# Patient Record
Sex: FEMALE | Race: WHITE | NOT HISPANIC OR LATINO | Employment: FULL TIME | URBAN - METROPOLITAN AREA
[De-identification: names, ages, dates, MRNs, and addresses within clinical notes are randomized per-mention and may not be internally consistent; named-entity substitution may affect disease eponyms.]

---

## 2017-01-04 ENCOUNTER — OFFICE VISIT (OUTPATIENT)
Dept: FAMILY MEDICINE CLINIC | Facility: CLINIC | Age: 62
End: 2017-01-04

## 2017-01-04 VITALS
HEIGHT: 62 IN | SYSTOLIC BLOOD PRESSURE: 110 MMHG | WEIGHT: 117 LBS | HEART RATE: 98 BPM | RESPIRATION RATE: 16 BRPM | BODY MASS INDEX: 21.53 KG/M2 | TEMPERATURE: 98.5 F | DIASTOLIC BLOOD PRESSURE: 70 MMHG | OXYGEN SATURATION: 99 %

## 2017-01-04 DIAGNOSIS — R05.9 COUGH: ICD-10-CM

## 2017-01-04 DIAGNOSIS — J06.9 ACUTE URI: Primary | ICD-10-CM

## 2017-01-04 PROCEDURE — 99213 OFFICE O/P EST LOW 20 MIN: CPT | Performed by: FAMILY MEDICINE

## 2017-01-04 RX ORDER — AZITHROMYCIN 250 MG/1
TABLET, FILM COATED ORAL
Qty: 6 TABLET | Refills: 0 | Status: SHIPPED | OUTPATIENT
Start: 2017-01-04 | End: 2017-02-02

## 2017-01-04 NOTE — PATIENT INSTRUCTIONS
I have given you a Zpak and advised continuing Mucinex and lots of fluids.  Please call if not improving in a week or so.  Please come back for a Preventive Exam.

## 2017-01-04 NOTE — PROGRESS NOTES
Subjective   June Baca is a 61 y.o. female.     History of Present Illness   Stormy is here w/ productive cough.  She states she became ill about 3 weeks ago w/ uri but cough has remained.  She is taking Mucinex, Sudafed and [place].she reports no fever, mild headache, no muscle aches.    The following portions of the patient's history were reviewed and updated as appropriate: allergies, current medications, past medical history, past social history and problem list.    Review of Systems   HENT: Positive for voice change.    Respiratory: Positive for cough.    All other systems reviewed and are negative.      Objective   Physical Exam   Constitutional: She is oriented to person, place, and time. She appears well-developed and well-nourished. No distress.   HENT:   Head: Normocephalic and atraumatic.   Right Ear: External ear normal.   Left Ear: External ear normal.   Nose: Nose normal.   Mouth/Throat: Oropharynx is clear and moist. No oropharyngeal exudate.   Eyes: Conjunctivae and EOM are normal. Pupils are equal, round, and reactive to light.   Neck: Normal range of motion.   Cardiovascular: Normal rate and regular rhythm.    Pulmonary/Chest: Effort normal and breath sounds normal. No stridor. No respiratory distress. She has no wheezes.   Lymphadenopathy:     She has no cervical adenopathy.   Neurological: She is alert and oriented to person, place, and time.   Skin: Skin is warm and dry. No rash noted.   Nursing note and vitals reviewed.      Assessment/Plan   June was seen today for cough.    Diagnoses and all orders for this visit:    Acute URI    Cough  I have advised OTC cough meds and started her on a  -     azithromycin (ZITHROMAX Z-TONIE) 250 MG tablet; Take 2 tablets the first day, then 1 tablet daily for 4 days.

## 2017-01-04 NOTE — MR AVS SNAPSHOT
June Baca   1/4/2017 9:50 AM   Office Visit    Provider:  Mike Goldsmith MD   Department:  [facility] PRIMARY CARE   Dept Phone:  [phone]                Your Full Care Plan              Today's Medication Changes          These changes are accurate as of: 1/4/17 10:56 AM.  If you have any questions, ask your nurse or doctor.               New Medication(s)Ordered:     azithromycin 250 MG tablet   Commonly known as:  ZITHROMAX Z-TONIE   Take 2 tablets the first day, then 1 tablet daily for 4 days.   Started by:  Mike Goldsmith MD            Where to Get Your Medications      These medications were sent to [address] AT [address] [place] [address] - [phone] [place] [id number] [place] [address]     Phone:  [phone]     azithromycin 250 MG tablet                  Your Updated Medication List          This list is accurate as of: 1/4/17 10:56 AM.  Always use your most recent med list.                azithromycin 250 MG tablet   Commonly known as:  ZITHROMAX Z-TONIE   Take 2 tablets the first day, then 1 tablet daily for 4 days.       betamethasone dipropionate 0.05 % cream   Commonly known as:  DIPROLENE       cetirizine 10 MG tablet   Commonly known as:  zyrTEC       fluticasone 50 MCG/ACT nasal spray   Commonly known as:  FLONASE       pseudoephedrine 30 MG tablet   Commonly known as:  SUDAFED       raloxifene 60 MG tablet   Commonly known as:  EVISTA   Take 1 tablet by mouth Daily.       Vitamin C ER 1000 MG tablet controlled-release               Instructions    I have given you a Zpak and advised continuing Mucinex and lots of fluids.  Please call if not improving in a week or so.  Please come back for a Preventive Exam.      Patient Instructions History      [facility] Signup     [facility] allows you to send messages to your doctor, view your test results, renew your  "prescriptions, schedule appointments, and more. To sign up, go to [employer] and click on the Sign Up Now link in the New User? box. Enter your [employer] Activation Code exactly as it appears below along with the last four digits of your Social Security Number and your Date of Birth () to complete the sign-up process. If you do not sign up before the expiration date, you must request a new code.    [employer] Activation Code: [email or web address]  Expires: 2017 10:53 AM    If you have questions, you can email [email or web address] or call [phone] to talk to our [employer] staff. Remember, [employer] is NOT to be used for urgent needs. For medical emergencies, dial 911.               Other Info from Your Visit           Your Appointments     2017 11:00 AM EST   Physical with Mike Goldsmith MD   [facility] PRIMARY CARE (--)    [address] [place], [place] [id number] [phone]           Arrive 15 minutes prior to appointment.            Feb 10, 2017  9:00 AM EST   [facility] (--)    [address] [place] [address] [phone]            2017 11:00 AM EDT   Lab with LAB CHAIR [facility] ([address] [address]   [phone]            2017 11:40 AM EDT   FOLLOW UP with Jamia Bishop MD   [facility] CBC GROUP: CONSULTANTS IN BLOOD DISORDERS AND CANCER ([facility])    [address] [address] [phone]              Allergies     No Known Allergies      Reason for Visit     Cough           Vital Signs     Blood Pressure Pulse Temperature Respirations Height Weight    110/70 98 98.5 °F (36.9 °C) 16 62\" (157.5 cm) 117 lb (53.1 kg)    Oxygen Saturation Body Mass Index Smoking Status             99% 21.4 kg/m2 Never Smoker         "

## 2017-01-27 ENCOUNTER — LAB (OUTPATIENT)
Dept: FAMILY MEDICINE CLINIC | Facility: CLINIC | Age: 62
End: 2017-01-27

## 2017-01-27 DIAGNOSIS — C50.919 MALIGNANT NEOPLASM OF FEMALE BREAST, UNSPECIFIED LATERALITY, UNSPECIFIED SITE OF BREAST: ICD-10-CM

## 2017-01-27 DIAGNOSIS — C50.919 MALIGNANT NEOPLASM OF BREAST (HCC): ICD-10-CM

## 2017-01-27 DIAGNOSIS — Z13.220 SCREENING FOR LIPOID DISORDERS: Primary | ICD-10-CM

## 2017-01-27 LAB
CHOLEST SERPL-MCNC: 210 MG/DL (ref 0–200)
HDLC SERPL-MCNC: 73 MG/DL (ref 40–60)
LDLC SERPL CALC-MCNC: 123 MG/DL (ref 0–100)
LDLC/HDLC SERPL: 1.68 {RATIO}
TRIGL SERPL-MCNC: 71 MG/DL (ref 0–150)
VLDLC SERPL CALC-MCNC: 14.2 MG/DL (ref 5–40)

## 2017-02-02 ENCOUNTER — OFFICE VISIT (OUTPATIENT)
Dept: FAMILY MEDICINE CLINIC | Facility: CLINIC | Age: 62
End: 2017-02-02

## 2017-02-02 VITALS
SYSTOLIC BLOOD PRESSURE: 104 MMHG | DIASTOLIC BLOOD PRESSURE: 64 MMHG | HEIGHT: 62 IN | WEIGHT: 118 LBS | BODY MASS INDEX: 21.71 KG/M2 | RESPIRATION RATE: 16 BRPM | HEART RATE: 82 BPM | OXYGEN SATURATION: 99 %

## 2017-02-02 DIAGNOSIS — Z00.00 ROUTINE GENERAL MEDICAL EXAMINATION AT A HEALTH CARE FACILITY: Primary | ICD-10-CM

## 2017-02-02 PROCEDURE — 99396 PREV VISIT EST AGE 40-64: CPT | Performed by: FAMILY MEDICINE

## 2017-02-02 NOTE — PATIENT INSTRUCTIONS
Preventive Care for Adults, Female  A healthy lifestyle and preventive care can promote health and wellness. Preventive health guidelines for women include the following key practices.  · A routine yearly physical is a good way to check with your health care provider about your health and preventive screening. It is a chance to share any concerns and updates on your health and to receive a thorough exam.  · Visit your dentist for a routine exam and preventive care every 6 months. Brush your teeth twice a day and floss once a day. Good oral hygiene prevents tooth decay and gum disease.  · The frequency of eye exams is based on your age, health, family medical history, use of contact lenses, and other factors. Follow your health care provider's recommendations for frequency of eye exams.  · Eat a healthy diet. Foods like vegetables, fruits, whole grains, low-fat dairy products, and lean protein foods contain the nutrients you need without too many calories. Decrease your intake of foods high in solid fats, added sugars, and salt. Eat the right amount of calories for you. Get information about a proper diet from your health care provider, if necessary.  · Regular physical exercise is one of the most important things you can do for your health. Most adults should get at least 150 minutes of moderate-intensity exercise (any activity that increases your heart rate and causes you to sweat) each week. In addition, most adults need muscle-strengthening exercises on 2 or more days a week.  · Maintain a healthy weight. The body mass index (BMI) is a screening tool to identify possible weight problems. It provides an estimate of body fat based on height and weight. Your health care provider can find your BMI and can help you achieve or maintain a healthy weight. For adults 20 years and older:    A BMI below 18.5 is considered underweight.    A BMI of 18.5 to 24.9 is normal.    A BMI of 25 to 29.9 is considered overweight.    A  BMI of 30 and above is considered obese.  · Maintain normal blood lipids and cholesterol levels by exercising and minimizing your intake of saturated fat. Eat a balanced diet with plenty of fruit and vegetables. Blood tests for lipids and cholesterol should begin at age 20 and be repeated every 5 years. If your lipid or cholesterol levels are high, you are over 50, or you are at high risk for heart disease, you may need your cholesterol levels checked more frequently. Ongoing high lipid and cholesterol levels should be treated with medicines if diet and exercise are not working.  · If you smoke, find out from your health care provider how to quit. If you do not use tobacco, do not start.  · Lung cancer screening is recommended for adults aged 55-80 years who are at high risk for developing lung cancer because of a history of smoking. A yearly low-dose CT scan of the lungs is recommended for people who have at least a 30-pack-year history of smoking and are a current smoker or have quit within the past 15 years. A pack year of smoking is smoking an average of 1 pack of cigarettes a day for 1 year (for example: 1 pack a day for 30 years or 2 packs a day for 15 years). Yearly screening should continue until the smoker has stopped smoking for at least 15 years. Yearly screening should be stopped for people who develop a health problem that would prevent them from having lung cancer treatment.  · If you are pregnant, do not drink alcohol. If you are breastfeeding, be very cautious about drinking alcohol. If you are not pregnant and choose to drink alcohol, do not have more than 1 drink per day. One drink is considered to be 12 ounces (355 mL) of beer, 5 ounces (148 mL) of wine, or 1.5 ounces (44 mL) of liquor.  · Avoid use of street drugs. Do not share needles with anyone. Ask for help if you need support or instructions about stopping the use of drugs.  · High blood pressure causes heart disease and increases the risk  "of stroke. Your blood pressure should be checked at least every 1 to 2 years. Ongoing high blood pressure should be treated with medicines if weight loss and exercise do not work.  · If you are 55-79 years old, ask your health care provider if you should take aspirin to prevent strokes.  · Diabetes screening is done by taking a blood sample to check your blood glucose level after you have not eaten for a certain period of time (fasting). If you are not overweight and you do not have risk factors for diabetes, you should be screened once every 3 years starting at age 45. If you are overweight or obese and you are 40-70 years of age, you should be screened for diabetes every year as part of your cardiovascular risk assessment.  · Breast cancer screening is essential preventive care for women. You should practice \"breast self-awareness.\" This means understanding the normal appearance and feel of your breasts and may include breast self-examination. Any changes detected, no matter how small, should be reported to a health care provider. Women in their 20s and 30s should have a clinical breast exam (CBE) by a health care provider as part of a regular health exam every 1 to 3 years. After age 40, women should have a CBE every year. Starting at age 40, women should consider having a mammogram (breast X-ray test) every year. Women who have a family history of breast cancer should talk to their health care provider about genetic screening. Women at a high risk of breast cancer should talk to their health care providers about having an MRI and a mammogram every year.  · Breast cancer gene (BRCA)-related cancer risk assessment is recommended for women who have family members with BRCA-related cancers. BRCA-related cancers include breast, ovarian, tubal, and peritoneal cancers. Having family members with these cancers may be associated with an increased risk for harmful changes (mutations) in the breast cancer genes BRCA1 and " BRCA2. Results of the assessment will determine the need for genetic counseling and BRCA1 and BRCA2 testing.  · Your health care provider may recommend that you be screened regularly for cancer of the pelvic organs (ovaries, uterus, and vagina). This screening involves a pelvic examination, including checking for microscopic changes to the surface of your cervix (Pap test). You may be encouraged to have this screening done every 3 years, beginning at age 21.    For women ages 30-65, health care providers may recommend pelvic exams and Pap testing every 3 years, or they may recommend the Pap and pelvic exam, combined with testing for human papilloma virus (HPV), every 5 years. Some types of HPV increase your risk of cervical cancer. Testing for HPV may also be done on women of any age with unclear Pap test results.    Other health care providers may not recommend any screening for nonpregnant women who are considered low risk for pelvic cancer and who do not have symptoms. Ask your health care provider if a screening pelvic exam is right for you.  · If you have had past treatment for cervical cancer or a condition that could lead to cancer, you need Pap tests and screening for cancer for at least 20 years after your treatment. If Pap tests have been discontinued, your risk factors (such as having a new sexual partner) need to be reassessed to determine if screening should resume. Some women have medical problems that increase the chance of getting cervical cancer. In these cases, your health care provider may recommend more frequent screening and Pap tests.  · Colorectal cancer can be detected and often prevented. Most routine colorectal cancer screening begins at the age of 50 years and continues through age 75 years. However, your health care provider may recommend screening at an earlier age if you have risk factors for colon cancer. On a yearly basis, your health care provider may provide home test kits to check  for hidden blood in the stool. Use of a small camera at the end of a tube, to directly examine the colon (sigmoidoscopy or colonoscopy), can detect the earliest forms of colorectal cancer. Talk to your health care provider about this at age 50, when routine screening begins.  Direct exam of the colon should be repeated every 5-10 years through age 75 years, unless early forms of precancerous polyps or small growths are found.  · People who are at an increased risk for hepatitis B should be screened for this virus. You are considered at high risk for hepatitis B if:    You were born in a country where hepatitis B occurs often. Talk with your health care provider about which countries are considered high risk.    Your parents were born in a high-risk country and you have not received a shot to protect against hepatitis B (hepatitis B vaccine).    You have HIV or AIDS.    You use needles to inject street drugs.    You live with, or have sex with, someone who has hepatitis B.    You get hemodialysis treatment.    You take certain medicines for conditions like cancer, organ transplantation, and autoimmune conditions.  · Hepatitis C blood testing is recommended for all people born from 1945 through 1965 and any individual with known risks for hepatitis C.  · Practice safe sex. Use condoms and avoid high-risk sexual practices to reduce the spread of sexually transmitted infections (STIs). STIs include gonorrhea, chlamydia, syphilis, trichomonas, herpes, HPV, and human immunodeficiency virus (HIV). Herpes, HIV, and HPV are viral illnesses that have no cure. They can result in disability, cancer, and death.  · You should be screened for sexually transmitted illnesses (STIs) including gonorrhea and chlamydia if:    You are sexually active and are younger than 24 years.    You are older than 24 years and your health care provider tells you that you are at risk for this type of infection.    Your sexual activity has changed  since you were last screened and you are at an increased risk for chlamydia or gonorrhea. Ask your health care provider if you are at risk.  · If you are at risk of being infected with HIV, it is recommended that you take a prescription medicine daily to prevent HIV infection. This is called preexposure prophylaxis (PrEP). You are considered at risk if:    You are sexually active and do not regularly use condoms or know the HIV status of your partner(s).    You take drugs by injection.    You are sexually active with a partner who has HIV.    Talk with your health care provider about whether you are at high risk of being infected with HIV. If you choose to begin PrEP, you should first be tested for HIV. You should then be tested every 3 months for as long as you are taking PrEP.  · Osteoporosis is a disease in which the bones lose minerals and strength with aging. This can result in serious bone fractures or breaks. The risk of osteoporosis can be identified using a bone density scan. Women ages 65 years and over and women at risk for fractures or osteoporosis should discuss screening with their health care providers. Ask your health care provider whether you should take a calcium supplement or vitamin D to reduce the rate of osteoporosis.  · Menopause can be associated with physical symptoms and risks. Hormone replacement therapy is available to decrease symptoms and risks. You should talk to your health care provider about whether hormone replacement therapy is right for you.  · Use sunscreen. Apply sunscreen liberally and repeatedly throughout the day. You should seek shade when your shadow is shorter than you. Protect yourself by wearing long sleeves, pants, a wide-brimmed hat, and sunglasses year round, whenever you are outdoors.  · Once a month, do a whole body skin exam, using a mirror to look at the skin on your back. Tell your health care provider of new moles, moles that have irregular borders, moles that  are larger than a pencil eraser, or moles that have changed in shape or color.  · Stay current with required vaccines (immunizations).    Influenza vaccine. All adults should be immunized every year.    Tetanus, diphtheria, and acellular pertussis (Td, Tdap) vaccine. Pregnant women should receive 1 dose of Tdap vaccine during each pregnancy. The dose should be obtained regardless of the length of time since the last dose. Immunization is preferred during the 27th-36th week of gestation. An adult who has not previously received Tdap or who does not know her vaccine status should receive 1 dose of Tdap. This initial dose should be followed by tetanus and diphtheria toxoids (Td) booster doses every 10 years. Adults with an unknown or incomplete history of completing a 3-dose immunization series with Td-containing vaccines should begin or complete a primary immunization series including a Tdap dose. Adults should receive a Td booster every 10 years.    Varicella vaccine. An adult without evidence of immunity to varicella should receive 2 doses or a second dose if she has previously received 1 dose. Pregnant females who do not have evidence of immunity should receive the first dose after pregnancy. This first dose should be obtained before leaving the health care facility. The second dose should be obtained 4-8 weeks after the first dose.    Human papillomavirus (HPV) vaccine. Females aged 13-26 years who have not received the vaccine previously should obtain the 3-dose series. The vaccine is not recommended for use in pregnant females. However, pregnancy testing is not needed before receiving a dose. If a female is found to be pregnant after receiving a dose, no treatment is needed. In that case, the remaining doses should be delayed until after the pregnancy. Immunization is recommended for any person with an immunocompromised condition through the age of 26 years if she did not get any or all doses earlier. During the  3-dose series, the second dose should be obtained 4-8 weeks after the first dose. The third dose should be obtained 24 weeks after the first dose and 16 weeks after the second dose.    Zoster vaccine. One dose is recommended for adults aged 60 years or older unless certain conditions are present.    Measles, mumps, and rubella (MMR) vaccine. Adults born before 1957 generally are considered immune to measles and mumps. Adults born in 1957 or later should have 1 or more doses of MMR vaccine unless there is a contraindication to the vaccine or there is laboratory evidence of immunity to each of the three diseases. A routine second dose of MMR vaccine should be obtained at least 28 days after the first dose for students attending postsecondary schools, health care workers, or international travelers. People who received inactivated measles vaccine or an unknown type of measles vaccine during [id number] should receive 2 doses of MMR vaccine. People who received inactivated mumps vaccine or an unknown type of mumps vaccine before 1979 and are at high risk for mumps infection should consider immunization with 2 doses of MMR vaccine. For females of childbearing age, rubella immunity should be determined. If there is no evidence of immunity, females who are not pregnant should be vaccinated. If there is no evidence of immunity, females who are pregnant should delay immunization until after pregnancy. Unvaccinated health care workers born before 1957 who lack laboratory evidence of measles, mumps, or rubella immunity or laboratory confirmation of disease should consider measles and mumps immunization with 2 doses of MMR vaccine or rubella immunization with 1 dose of MMR vaccine.    Pneumococcal 13-valent conjugate (PCV13) vaccine. When indicated, a person who is uncertain of his immunization history and has no record of immunization should receive the PCV13 vaccine. All adults 65 years of age and older should receive this  vaccine. An adult aged 19 years or older who has certain medical conditions and has not been previously immunized should receive 1 dose of PCV13 vaccine. This PCV13 should be followed with a dose of pneumococcal polysaccharide (PPSV23) vaccine. Adults who are at high risk for pneumococcal disease should obtain the PPSV23 vaccine at least 8 weeks after the dose of PCV13 vaccine. Adults older than 65 years of age who have normal immune system function should obtain the PPSV23 vaccine dose at least 1 year after the dose of PCV13 vaccine.    Pneumococcal polysaccharide (PPSV23) vaccine. When PCV13 is also indicated, PCV13 should be obtained first. All adults aged 65 years and older should be immunized. An adult younger than age 65 years who has certain medical conditions should be immunized. Any person who resides in a nursing home or long-term care facility should be immunized. An adult smoker should be immunized. People with an immunocompromised condition and certain other conditions should receive both PCV13 and PPSV23 vaccines. People with human immunodeficiency virus (HIV) infection should be immunized as soon as possible after diagnosis. Immunization during chemotherapy or radiation therapy should be avoided. Routine use of PPSV23 vaccine is not recommended for American Indians, [place] Natives, or people younger than 65 years unless there are medical conditions that require PPSV23 vaccine. When indicated, people who have unknown immunization and have no record of immunization should receive PPSV23 vaccine. One-time revaccination 5 years after the first dose of PPSV23 is recommended for people aged 19-64 years who have chronic kidney failure, nephrotic syndrome, asplenia, or immunocompromised conditions. People who received 1-2 doses of PPSV23 before age 65 years should receive another dose of PPSV23 vaccine at age 65 years or later if at least 5 years have passed since the previous dose. Doses of PPSV23 are not  needed for people immunized with PPSV23 at or after age 65 years.    Meningococcal vaccine. Adults with asplenia or persistent complement component deficiencies should receive 2 doses of quadrivalent meningococcal conjugate (MenACWY-D) vaccine. The doses should be obtained at least 2 months apart. Microbiologists working with certain meningococcal bacteria,  recruits, people at risk during an outbreak, and people who travel to or live in countries with a high rate of meningitis should be immunized. A first-year college student up through age 21 years who is living in a residence schmidt should receive a dose if she did not receive a dose on or after her 16th birthday. Adults who have certain high-risk conditions should receive one or more doses of vaccine.    Hepatitis A vaccine. Adults who wish to be protected from this disease, have certain high-risk conditions, work with hepatitis A-infected animals, work in hepatitis A research labs, or travel to or work in countries with a high rate of hepatitis A should be immunized. Adults who were previously unvaccinated and who anticipate close contact with an international adoptee during the first 60 days after arrival in the [place] from a country with a high rate of hepatitis A should be immunized.    Hepatitis B vaccine. Adults who wish to be protected from this disease, have certain high-risk conditions, may be exposed to blood or other infectious body fluids, are household contacts or sex partners of hepatitis B positive people, are clients or workers in certain care facilities, or travel to or work in countries with a high rate of hepatitis B should be immunized.    Haemophilus influenzae type b (Hib) vaccine. A previously unvaccinated person with asplenia or sickle cell disease or having a scheduled splenectomy should receive 1 dose of Hib vaccine. Regardless of previous immunization, a recipient of a hematopoietic stem cell transplant should receive a  3-dose series 6-12 months after her successful transplant. Hib vaccine is not recommended for adults with HIV infection.  Preventive Services / Frequency  Ages 19 to 39 years  · Blood pressure check.** / Every 3-5 years.  · Lipid and cholesterol check.** / Every 5 years beginning at age 20.  · Clinical breast exam.** / Every 3 years for women in their 20s and 30s.  · BRCA-related cancer risk assessment.** / For women who have family members with a BRCA-related cancer (breast, ovarian, tubal, or peritoneal cancers).  · Pap test.** / Every 2 years from ages 21 through 29. Every 3 years starting at age 30 through age 65 or 70 with a history of 3 consecutive normal Pap tests.  · HPV screening.** / Every 3 years from ages 30 through ages 65 to 70 with a history of 3 consecutive normal Pap tests.  · Hepatitis C blood test.** / For any individual with known risks for hepatitis C.  · Skin self-exam. / Monthly.  · Influenza vaccine. / Every year.  · Tetanus, diphtheria, and acellular pertussis (Tdap, Td) vaccine.** / Consult your health care provider. Pregnant women should receive 1 dose of Tdap vaccine during each pregnancy. 1 dose of Td every 10 years.  · Varicella vaccine.** / Consult your health care provider. Pregnant females who do not have evidence of immunity should receive the first dose after pregnancy.  · HPV vaccine. / 3 doses over 6 months, if 26 and younger. The vaccine is not recommended for use in pregnant females. However, pregnancy testing is not needed before receiving a dose.  · Measles, mumps, rubella (MMR) vaccine.** / You need at least 1 dose of MMR if you were born in 1957 or later. You may also need a 2nd dose. For females of childbearing age, rubella immunity should be determined. If there is no evidence of immunity, females who are not pregnant should be vaccinated. If there is no evidence of immunity, females who are pregnant should delay immunization until after pregnancy.  · Pneumococcal  13-valent conjugate (PCV13) vaccine.** / Consult your health care provider.  · Pneumococcal polysaccharide (PPSV23) vaccine.** / 1 to 2 doses if you smoke cigarettes or if you have certain conditions.  · Meningococcal vaccine.** / 1 dose if you are age 19 to 21 years and a first-year college student living in a residence schmidt, or have one of several medical conditions, you need to get vaccinated against meningococcal disease. You may also need additional booster doses.  · Hepatitis A vaccine.** / Consult your health care provider.  · Hepatitis B vaccine.** / Consult your health care provider.  · Haemophilus influenzae type b (Hib) vaccine.** / Consult your health care provider.  Ages 40 to 64 years  · Blood pressure check.** / Every year.  · Lipid and cholesterol check.** / Every 5 years beginning at age 20 years.  · Lung cancer screening. / Every year if you are aged 55-80 years and have a 30-pack-year history of smoking and currently smoke or have quit within the past 15 years. Yearly screening is stopped once you have quit smoking for at least 15 years or develop a health problem that would prevent you from having lung cancer treatment.  · Clinical breast exam.** / Every year after age 40 years.  ·  BRCA-related cancer risk assessment.** / For women who have family members with a BRCA-related cancer (breast, ovarian, tubal, or peritoneal cancers).  · Mammogram.** / Every year beginning at age 40 years and continuing for as long as you are in good health. Consult with your health care provider.  · Pap test.** / Every 3 years starting at age 30 years through age 65 or 70 years with a history of 3 consecutive normal Pap tests.  · HPV screening.** / Every 3 years from ages 30 years through ages 65 to 70 years with a history of 3 consecutive normal Pap tests.  · Fecal occult blood test (FOBT) of stool. / Every year beginning at age 50 years and continuing until age 75 years. You may not need to do this test if you get  a colonoscopy every 10 years.  · Flexible sigmoidoscopy or colonoscopy.** / Every 5 years for a flexible sigmoidoscopy or every 10 years for a colonoscopy beginning at age 50 years and continuing until age 75 years.  · Hepatitis C blood test.** / For all people born from 1945 through 1965 and any individual with known risks for hepatitis C.  · Skin self-exam. / Monthly.  · Influenza vaccine. / Every year.  · Tetanus, diphtheria, and acellular pertussis (Tdap/Td) vaccine.** / Consult your health care provider. Pregnant women should receive 1 dose of Tdap vaccine during each pregnancy. 1 dose of Td every 10 years.  · Varicella vaccine.** / Consult your health care provider. Pregnant females who do not have evidence of immunity should receive the first dose after pregnancy.  · Zoster vaccine.** / 1 dose for adults aged 60 years or older.  · Measles, mumps, rubella (MMR) vaccine.** / You need at least 1 dose of MMR if you were born in 1957 or later. You may also need a second dose. For females of childbearing age, rubella immunity should be determined. If there is no evidence of immunity, females who are not pregnant should be vaccinated. If there is no evidence of immunity, females who are pregnant should delay immunization until after pregnancy.  · Pneumococcal 13-valent conjugate (PCV13) vaccine.** / Consult your health care provider.  · Pneumococcal polysaccharide (PPSV23) vaccine.** / 1 to 2 doses if you smoke cigarettes or if you have certain conditions.  · Meningococcal vaccine.** / Consult your health care provider.  · Hepatitis A vaccine.** / Consult your health care provider.  · Hepatitis B vaccine.** / Consult your health care provider.  · Haemophilus influenzae type b (Hib) vaccine.** / Consult your health care provider.  Ages 65 years and over  · Blood pressure check.** / Every year.  · Lipid and cholesterol check.** / Every 5 years beginning at age 20 years.  · Lung cancer screening. / Every year if you  are aged 55-80 years and have a 30-pack-year history of smoking and currently smoke or have quit within the past 15 years. Yearly screening is stopped once you have quit smoking for at least 15 years or develop a health problem that would prevent you from having lung cancer treatment.  · Clinical breast exam.** / Every year after age 40 years.  ·  BRCA-related cancer risk assessment.** / For women who have family members with a BRCA-related cancer (breast, ovarian, tubal, or peritoneal cancers).  · Mammogram.** / Every year beginning at age 40 years and continuing for as long as you are in good health. Consult with your health care provider.  · Pap test.** / Every 3 years starting at age 30 years through age 65 or 70 years with 3 consecutive normal Pap tests. Testing can be stopped between 65 and 70 years with 3 consecutive normal Pap tests and no abnormal Pap or HPV tests in the past 10 years.  · HPV screening.** / Every 3 years from ages 30 years through ages 65 or 70 years with a history of 3 consecutive normal Pap tests. Testing can be stopped between 65 and 70 years with 3 consecutive normal Pap tests and no abnormal Pap or HPV tests in the past 10 years.  · Fecal occult blood test (FOBT) of stool. / Every year beginning at age 50 years and continuing until age 75 years. You may not need to do this test if you get a colonoscopy every 10 years.  · Flexible sigmoidoscopy or colonoscopy.** / Every 5 years for a flexible sigmoidoscopy or every 10 years for a colonoscopy beginning at age 50 years and continuing until age 75 years.  · Hepatitis C blood test.** / For all people born from 1945 through 1965 and any individual with known risks for hepatitis C.  · Osteoporosis screening.** / A one-time screening for women ages 65 years and over and women at risk for fractures or osteoporosis.  · Skin self-exam. / Monthly.  · Influenza vaccine. / Every year.  · Tetanus, diphtheria, and acellular pertussis (Tdap/Td)  vaccine.** / 1 dose of Td every 10 years.  · Varicella vaccine.** / Consult your health care provider.  · Zoster vaccine.** / 1 dose for adults aged 60 years or older.  · Pneumococcal 13-valent conjugate (PCV13) vaccine.** / Consult your health care provider.  · Pneumococcal polysaccharide (PPSV23) vaccine.** / 1 dose for all adults aged 65 years and older.  · Meningococcal vaccine.** / Consult your health care provider.  · Hepatitis A vaccine.** / Consult your health care provider.  · Hepatitis B vaccine.** / Consult your health care provider.  · Haemophilus influenzae type b (Hib) vaccine.** / Consult your health care provider.  ** Family history and personal history of risk and conditions may change your health care provider's recommendations.     This information is not intended to replace advice given to you by your health care provider. Make sure you discuss any questions you have with your health care provider.     Document Released: 02/13/2003 Document Revised: 01/08/2016 Document Reviewed: 05/14/2012  [employer] Patient Education ©2016 [employer]

## 2017-02-02 NOTE — PROGRESS NOTES
"Preventive Exam    History of Present Illness: Kyle here for check up and review of routine health maintenance. She states she is doing well and has no concerns    REVIEW OF SYSTEMS  Constitutional: Negative.    HENT: Negative.    Eyes: Negative.    Respiratory: Negative.    Cardiovascular: Negative.    Gastrointestinal: Negative.    Endocrine: Negative.    Genitourinary: Negative.    Musculoskeletal: Negative for neck stiffness.   Skin: Negative.    Allergic/Immunologic: Negative.    Neurological: Negative.    Hematological: Negative.    Psychiatric/Behavioral: Negative.    All other systems reviewed and are negative.          PHYSICAL EXAM    Vitals:    02/02/17 1134   BP: 104/64   Pulse: 82   Resp: 16   SpO2: 99%   Weight: 118 lb (53.5 kg)   Height: 62\" (157.5 cm)     GENERAL: alert and oriented, afebrile and vital signs stable  HEENT: oral mucosa moist, PEERLA, EOM, conjunctiva normal  No cervical adenopathy  LUNGS: clear to ascultation bilaterally, no rales, ronchi or wheezing  HEART: RRR S1 S2 without murmers, thrills, rubs or gallops  CHEST WALL: within normal limits, no tenderness  ABDOMEN: WNL. Normal BS.  EXTREMITIES: No clubbing, cyanosis or edema noted. Normal Pulses.  SKIN: warm, dry, no rashes noted  NEURO: CN II- XII grossly intact    ASSESSMENT AND PLAN  Problem List Items Addressed This Visit     None      Visit Diagnoses     Routine general medical examination at a health care facility    -  Primary    Relevant Orders    Comprehensive Metabolic Panel (Completed)    CBC (No Diff) (Completed)        Routine health maintenance reviewed and discussed with June.    .  Orders Placed This Encounter   Procedures   • Comprehensive Metabolic Panel   • CBC (No Diff)     Return in about 1 year (around 2/2/2018) for Annual physical.  "

## 2017-02-03 LAB
ALBUMIN SERPL-MCNC: 3.8 G/DL (ref 3.5–5.5)
ALBUMIN/GLOB SERPL: 1.4 {RATIO} (ref 1.1–2.5)
ALP SERPL-CCNC: 65 IU/L (ref 39–117)
ALT SERPL-CCNC: 17 IU/L (ref 0–32)
AST SERPL-CCNC: 29 IU/L (ref 0–40)
BILIRUB SERPL-MCNC: 0.4 MG/DL (ref 0–1.2)
BUN SERPL-MCNC: 12 MG/DL (ref 6–24)
BUN/CREAT SERPL: 15 (ref 9–23)
CALCIUM SERPL-MCNC: 9 MG/DL (ref 8.7–10.2)
CHLORIDE SERPL-SCNC: 101 MMOL/L (ref 96–106)
CO2 SERPL-SCNC: 21 MMOL/L (ref 18–29)
CREAT SERPL-MCNC: 0.79 MG/DL (ref 0.57–1)
ERYTHROCYTE [DISTWIDTH] IN BLOOD BY AUTOMATED COUNT: 14.1 % (ref 12.3–15.4)
GLOBULIN SER CALC-MCNC: 2.7 G/DL (ref 1.5–4.5)
GLUCOSE SERPL-MCNC: 59 MG/DL (ref 65–99)
HCT VFR BLD AUTO: 36.1 % (ref 34–46.6)
HGB BLD-MCNC: 11.6 G/DL (ref 11.1–15.9)
MCH RBC QN AUTO: 31 PG (ref 26.6–33)
MCHC RBC AUTO-ENTMCNC: 32.1 G/DL (ref 31.5–35.7)
MCV RBC AUTO: 97 FL (ref 79–97)
PLATELET # BLD AUTO: 185 X10E3/UL (ref 150–379)
POTASSIUM SERPL-SCNC: 4.4 MMOL/L (ref 3.5–5.2)
PROT SERPL-MCNC: 6.5 G/DL (ref 6–8.5)
RBC # BLD AUTO: 3.74 X10E6/UL (ref 3.77–5.28)
SODIUM SERPL-SCNC: 140 MMOL/L (ref 134–144)
WBC # BLD AUTO: 4.4 X10E3/UL (ref 3.4–10.8)

## 2017-04-17 ENCOUNTER — LAB (OUTPATIENT)
Dept: LAB | Facility: HOSPITAL | Age: 62
End: 2017-04-17
Attending: INTERNAL MEDICINE

## 2017-04-17 ENCOUNTER — OFFICE VISIT (OUTPATIENT)
Dept: ONCOLOGY | Facility: CLINIC | Age: 62
End: 2017-04-17
Attending: INTERNAL MEDICINE

## 2017-04-17 VITALS
TEMPERATURE: 98.4 F | SYSTOLIC BLOOD PRESSURE: 102 MMHG | HEART RATE: 82 BPM | OXYGEN SATURATION: 100 % | WEIGHT: 118.6 LBS | DIASTOLIC BLOOD PRESSURE: 52 MMHG | HEIGHT: 62 IN | RESPIRATION RATE: 16 BRPM | BODY MASS INDEX: 21.83 KG/M2

## 2017-04-17 DIAGNOSIS — C50.919 MALIGNANT NEOPLASM OF FEMALE BREAST, UNSPECIFIED LATERALITY, UNSPECIFIED SITE OF BREAST: Primary | ICD-10-CM

## 2017-04-17 DIAGNOSIS — C50.011 MALIGNANT NEOPLASM INVOLVING BOTH NIPPLE AND AREOLA OF RIGHT BREAST IN FEMALE (HCC): Primary | ICD-10-CM

## 2017-04-17 LAB
ALBUMIN SERPL-MCNC: 4.2 G/DL (ref 3.5–5.2)
ALBUMIN/GLOB SERPL: 1.6 G/DL (ref 1.1–2.4)
ALP SERPL-CCNC: 70 U/L (ref 38–116)
ALT SERPL W P-5'-P-CCNC: 19 U/L (ref 0–33)
ANION GAP SERPL CALCULATED.3IONS-SCNC: 11.4 MMOL/L
AST SERPL-CCNC: 29 U/L (ref 0–32)
BASOPHILS # BLD AUTO: 0.07 10*3/MM3 (ref 0–0.1)
BASOPHILS NFR BLD AUTO: 1.3 % (ref 0–1.1)
BILIRUB SERPL-MCNC: 0.3 MG/DL (ref 0.1–1.2)
BUN BLD-MCNC: 12 MG/DL (ref 6–20)
BUN/CREAT SERPL: 15.2 (ref 7.3–30)
CALCIUM SPEC-SCNC: 9.1 MG/DL (ref 8.5–10.2)
CHLORIDE SERPL-SCNC: 103 MMOL/L (ref 98–107)
CO2 SERPL-SCNC: 27.6 MMOL/L (ref 22–29)
CREAT BLD-MCNC: 0.79 MG/DL (ref 0.6–1.1)
DEPRECATED RDW RBC AUTO: 46 FL (ref 37–49)
EOSINOPHIL # BLD AUTO: 0.11 10*3/MM3 (ref 0–0.36)
EOSINOPHIL NFR BLD AUTO: 2 % (ref 1–5)
ERYTHROCYTE [DISTWIDTH] IN BLOOD BY AUTOMATED COUNT: 13 % (ref 11.7–14.5)
GFR SERPL CREATININE-BSD FRML MDRD: 74 ML/MIN/1.73
GLOBULIN UR ELPH-MCNC: 2.7 GM/DL (ref 1.8–3.5)
GLUCOSE BLD-MCNC: 73 MG/DL (ref 74–124)
HCT VFR BLD AUTO: 38.2 % (ref 34–45)
HGB BLD-MCNC: 12.6 G/DL (ref 11.5–14.9)
IMM GRANULOCYTES # BLD: 0.03 10*3/MM3 (ref 0–0.03)
IMM GRANULOCYTES NFR BLD: 0.5 % (ref 0–0.5)
LYMPHOCYTES # BLD AUTO: 1.71 10*3/MM3 (ref 1–3.5)
LYMPHOCYTES NFR BLD AUTO: 30.8 % (ref 20–49)
MCH RBC QN AUTO: 31.7 PG (ref 27–33)
MCHC RBC AUTO-ENTMCNC: 33 G/DL (ref 32–35)
MCV RBC AUTO: 96.2 FL (ref 83–97)
MONOCYTES # BLD AUTO: 0.68 10*3/MM3 (ref 0.25–0.8)
MONOCYTES NFR BLD AUTO: 12.2 % (ref 4–12)
NEUTROPHILS # BLD AUTO: 2.96 10*3/MM3 (ref 1.5–7)
NEUTROPHILS NFR BLD AUTO: 53.2 % (ref 39–75)
NRBC BLD MANUAL-RTO: 0 /100 WBC (ref 0–0)
PLATELET # BLD AUTO: 174 10*3/MM3 (ref 150–375)
PMV BLD AUTO: 11.3 FL (ref 8.9–12.1)
POTASSIUM BLD-SCNC: 4.1 MMOL/L (ref 3.5–4.7)
PROT SERPL-MCNC: 6.9 G/DL (ref 6.3–8)
RBC # BLD AUTO: 3.97 10*6/MM3 (ref 3.9–5)
SODIUM BLD-SCNC: 142 MMOL/L (ref 134–145)
WBC NRBC COR # BLD: 5.56 10*3/MM3 (ref 4–10)

## 2017-04-17 PROCEDURE — 36415 COLL VENOUS BLD VENIPUNCTURE: CPT | Performed by: INTERNAL MEDICINE

## 2017-04-17 PROCEDURE — 80053 COMPREHEN METABOLIC PANEL: CPT | Performed by: INTERNAL MEDICINE

## 2017-04-17 PROCEDURE — 85025 COMPLETE CBC W/AUTO DIFF WBC: CPT

## 2017-04-17 PROCEDURE — 99214 OFFICE O/P EST MOD 30 MIN: CPT | Performed by: INTERNAL MEDICINE

## 2017-04-17 PROCEDURE — 36416 COLLJ CAPILLARY BLOOD SPEC: CPT

## 2017-04-17 RX ORDER — RALOXIFENE HYDROCHLORIDE 60 MG/1
TABLET, FILM COATED ORAL
COMMUNITY
End: 2017-12-28 | Stop reason: SDUPTHER

## 2017-04-17 RX ORDER — MULTIVIT WITH MINERALS/LUTEIN
250 TABLET ORAL
COMMUNITY
End: 2017-10-09 | Stop reason: SDDI

## 2017-04-18 NOTE — PROGRESS NOTES
Subjective .     REASONS FOR FOLLOWUP:   1. 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 Status post lumpectomy, sentinel lymph node biopsy 01/30 /2013.    2. Status post Mediport placement 02/27/2013 by Dr. Arita.    3. Baseline echocardiogram 03/07/2013 with normal ejection fraction of 61%.    4. Patient started on adjuvant chemotherapy with Adriamycin and Cytoxan on 3/11/2013 for 4 cycles, followed by weekly Taxol.    5. 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    6. Peripheral neuropathy related to weekly Taxol.    7. Mild neutropenia related to chemotherapy, required Neupogen for marrow support.    8. Moderate thrombocytopenia detected on 11/21/2014. The patient is here for further evaluation.   9. Patient switched from tamoxifen after 3 years to Evista as she could not tolerate tamoxifen and refused Arimidex.  HISTORY OF PRESENT ILLNESS:  The patient is a 61 y.o. year old female who is here for follow-up with the above-mentioned history.    History of Present Illness     The patient is a 60-year-old female with the above history, currently here for followup. She is on tamoxifen. She is tolerating tamoxifen well except that she has hot flashes for which she has to take Effexor. She has mild peripheral neuropathy for which she is taking vitamin B6 and B12 and seems to help. She is postmenopausal now. At the time that we started her on hormonal treatment we placed her on tamoxifen bein g perimenopausal but we discussed switching to Arimidex at her next appointment after we get the bone density. She has undergone a mammogram 2 weeks ago and has  been negative.    Pt had bone density which showed osteopenia,.  Given osteopenia patient is not too keen on going on Arimidex.  We had discussed giving Fosamax or prolia for  osteopenia but patient refused Arimidex.  Moreover her breast cancer was not highly ER positive.  So we could consider a chemoprophylaxis with Evista.  Patient is tolerating Evista now.    PAST MEDICAL HISTORY: Significant for laparoscopic surgery at [facility] for Tribe rine polyps, ovarian cyst and endometriosis. She had ingrown toenails x2 and has had surgery on the toenails by Dr. Robbin Garcia. She has had D and C in the past for irregular bleeding. Inguinal hernia surgery on the right in .      OB/GYN HISTORY: Menarche age 15. She is postmenopausal; last LMP 2012.  0.      Past Medical History:   Diagnosis Date   • Endometriosis    • Hot flashes due to tamoxifen    • Inguinal hernia    • Invasive ductal carcinoma of breast     WITH METAPLASTIC FEATURES (SPINDLE CELL METAPLASIA) 1.5CM GRADE 2, ER WEAKLY POSITIVE, NM NEGATIVE, HER-2/SOLITARIO NEGATIVE   • ITP (idiopathic thrombocytopenic purpura)    • Neutropenia, drug-induced     RELATED TO CHEMOTHERAPY   • Ovarian cyst    • Peripheral neuropathy     RELATED TO TAXOL   • Postmenopausal    • Thrombocytopenia     SECONDARY TO MILD ITP   • Uterine polyp        ONCOLOGIC HISTORY:  (History from previous dates can be found in the separate document.)  ONCOLOGIC HISTORY: The patient has had multiple cysts in both breasts requiring aspiration in the past and path has always been negative for malignancy back in 2006 and in 2010 subsequently she underwent aspiration even in 2011, w hich all were suggestive of fibroid adenoma. She has fibrocystic disease. More recently she underwent a mammogram on 12 - at that time she had a digital diagnostic mammogram. She had calcifications in both breasts which were thought to be benign and negative. She had a stable complicated cyst in the right breast at 12 o' clock position which was thought to be benign. Followup ultrasound of the right breast was recommended in six months. The complicated cyst  in the right breast probably benign; however there was an inframammary lymph node in the right breast at 9 o 'clock position which was also thought to be benign. Hence a six month followup was suggested with ultrasound.     Subsequently on 01/04/13 she underwent right breast ultrasound, which showed that there was a stable round complicated cyst with thin margins measuring 3 mm from the right breast at 12 o' clock position. There were multiple stable oval elongated complicated cysts in the right breast with thin margins, which were thought to be maciel ign, but there was a new microlobulated taller-than-wide solid mass with partially defined margins, which was 1.4 cm in the right breast. Because of this new solid mass in the right breast, which was suspicious, ultrasound guided vacuum-assisted biopsy w as recommended. The biopsy was done on 01/08/13 - the right breast ten-thirty position core biopsy showed invasive mammary carcinoma, metaplastic-type, subtype adenocarcinoma with spindle cell metaplasia. It was a grade 2 tumor with a tubular differentiat ion score of 3, high nuclear grade score of 3 and mitotic figure score of 1. There was one mitotic figure per 10 HPF. There was no DCIS identified. The Accession # on this was [id number]. Estrogen receptor was 10%, progesterone receptor 0%, HER2/corey w as 0% and Ki-67 as elevated at 56%. She underwent a chest x-ray on 01/16/13 which showed that there were no suspicious pulmonary opacities seen. There was slight scarring on both lung apices. MRI of the breast was done on 01/21/13 which showed 1.5 cm i r regular enhancing mass within the axillary tale region of the right breast representing the biopsy proven site of malignancy. No evidence of axillary adenopathy was appreciated. There were no findings suspicious for malignancy in the left breast. She d i d have some scattered foci of variable enhancement thought to be secondary to moderate fibrocystic breast changes.  Subsequently patient was referred to Dr. Arita, who did a right breast ultrasound-guided lumpectomy with a right axillary sentinel lymph node dissection with lymphoscintigraphic guidance on 01/30/13. The pathology report (Accession # [id number]) showed sentinel lymph node #1 in the right axilla had 2 nodes, a smaller node which did not show any malignancy and the larger node also did not sh o w malignancy. The sentinel lymph node #2 in the right axilla did not show any malignancy. The right breast lumpectomy specimen did show invasive ductal carcinoma with metaplastic features. The margins of resection were negative. The right breast additi o nal superior margin did not show any tumor. The lateral margin, the inferior margin as well as the deep margin did not show any malignancy. There was skeletal muscle present at the deep margin, but did not have any malignancy. The tumor size was 1.5 cm . It was considered as invasive ductal carcinoma metaplastic-type adenocarcinoma with spindle cell metaplasia. It had a Roberto score of 7 out of 9 with mitotic score of 1, nuclear pleomorphism of 3 and tubular differentiation of 3. It was a grade 2 t umor. There was a single focus of invasive carcinoma. Ductal carcinoma in situ was not present. There was some atypical ductal hyperplasia. Margins were uninvolved with invasive carcinoma. Distance from the closest margins was 4 mm. Deep margin on s p ecimen #3. Additional deep margin tissue on specimen #7 is negative as well, including skeletal muscle. The invasive carcinoma is 5 mm from the superior margin adjacent to the skin. It was a [id number] invasive carcinoma with metaplastic features with anna nocarcinoma with spindle cell features. ER is 10%, WA is 0%, HER2/corey is 0% and Ki-67 is 56%. Patient has been referred here for further options of treatment.      The patient was felt to have higher risk disease due to the metaplastic spindle cell nature of her tumor despite its  size and negative nodes. It was recommended she proceed with adjuvant chemotherapy using dose dense Adriamycin/Cytoxan chemotherapy q. 2 weeks with Neulasta support x four cycles, followed by weekly Taxol x12 doses. 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 We will consider holding further Taxol if she develops any significant symptoms.      The patient underwent Mediport placement 02/27/13. [facility] echocardiogram 03/07/13 showed an ejection fraction of 61%. The patient was found to have thrombocytopenia with a platelet count initially of 136,000; on followup 03/08/13 it was 108,000. White count and hemoglobin were normal. IPF was significa n tly elevated at 24.7% suggestive of underlying diagnosis of ITP. We will check a B12, folate, ROLA panel. We will proceed on with treatment as planned. There was no splenomegaly on exam. We will follow her platelet count closely through treatment. If indeed she has ITP there may be some improvement with chemotherapy and steroids.    The patient was seen on 6/17/13 with worsening neutropenia related to Taxol. The decision was made to add Neupogen x2 days with each weekly Taxol dose to hopefully continue on schedule without delay.    Patient has no worsening of peripheral neuropathy when she was evaluated on 7/8/13. We will continue to finish Taxol weekly for three more doses. Patient was instructed to increase her vitamin B12 to 1000 mcg and vitamin B6 at 50 mg.    Patient was on tamoxifen initially but had severe hot flashes.  Hormone status was checked and she was postmenopausal as a result we tried to switch her to Arimidex but because of osteopenia patient refused.  We offered Fosamax orally or prolia subcutaneous and she did not want that.    Given her ER was only 10% and NV negative we could consider continuing tamoxifen or offer Evista for chemotherapy prophylaxis and she chose to go  on Evista.  She is tolerating Evista very well.    Mammography 07/10/2014 shows focal asymmetry in the right breast which is benign and stable calcifications in the left breast which is benign. One year followup recommended.    Mammogram done 07/13/2015 is negative.    Mammogram July 2016 is negative.  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 She is a  at [employer]. She drinks 5-7 glasses of wine per week. She does not smoke. She drinks occasional bourbon but doesn’ t drink any whiskey. No risk factors for HIV. No tattoos, no previous blood transfusions.      FAMILY HISTORY: Father had history of prostate cancer at age 73, but he is 85 in good health. Mother is 82 and in fair health. She has a brother who is 48 years old and two sisters ages 53 and 51, all of them in good health. There is no family histor y of breast cancer. There is no history of cancer in extended family as well.      Current Outpatient Prescriptions on File Prior to Visit   Medication Sig Dispense Refill   • Ascorbic Acid (VITAMIN C ER) 1000 MG tablet controlled-release Take  by mouth.     • betamethasone dipropionate (DIPROLENE) 0.05 % cream Apply  topically 2 (two) times a day.     • cetirizine (ZyrTEC) 10 MG tablet Take 10 mg by mouth daily.     • fluticasone (FLONASE) 50 MCG/ACT nasal spray 2 sprays into each nostril daily. Administer 2 sprays in each nostril for each dose.     • pseudoephedrine (SUDAFED) 30 MG tablet Take 30 mg by mouth as needed for congestion.     • raloxifene (EVISTA) 60 MG tablet Take 1 tablet by mouth Daily. 90 tablet 2     No current facility-administered medications on file prior to visit.        ALLERGIES:     Allergies   Allergen Reactions   • Latex        Social History     Social History   • Marital status:      Spouse name: Drew   • Number of children: N/A   • Years of education: College     Occupational History   •   "[employer]     Social History Main Topics   • Smoking status: Never Smoker   • Smokeless tobacco: Not on file   • Alcohol use Yes      Comment: 5-7 GLASSES OF WINE PER WEEK, DRINKS [place]   • Drug use: No   • Sexual activity: Defer     Other Topics Concern   • Not on file     Social History Narrative         Cancer-related family history includes Prostate cancer in her father.     Review of Systems  A comprehensive 14 point review of systems was performed and was negative except as mentioned.    Objective      Vitals:    04/17/17 1121   BP: 102/52   Pulse: 82   Resp: 16   Temp: 98.4 °F (36.9 °C)   TempSrc: Oral   SpO2: 100%   Weight: 118 lb 9.6 oz (53.8 kg)   Height: 62\" (157.5 cm)   PainSc: 0-No pain     Current Status 4/17/2017   ECOG score 0       Physical Exam    GENERAL:  Well-developed, well-nourished in no acute distress.   SKIN:  Warm, dry without rashes, purpura or petechiae.  HEAD:  Normocephalic.  EYES:  Pupils equal, round and reactive to light.  EOMs intact.  Conjunctivae normal.  EARS:  Hearing intact.  NOSE:  Septum midline.  No excoriations or nasal discharge.  MOUTH:  Tongue is well-papillated; no stomatitis or ulcers.  Lips normal.  THROAT:  Oropharynx without lesions or exudates.  NECK:  Supple with good range of motion; no thyromegaly or masses, no JVD.  LYMPHATICS:  No cervical, supraclavicular, axillary or inguinal adenopathy.  CHEST:  Lungs clear to percussion and auscultation. Good airflow.  CARDIAC:  Regular rate and rhythm without murmurs, rubs or gallops. Normal S1,S2.  ABDOMEN:  Soft, nontender with no organomegaly or masses.  EXTREMITIES:  No clubbing, cyanosis or edema.  NEUROLOGICAL:  Cranial Nerves II-XII grossly intact.  No focal neurological deficits.  PSYCHIATRIC:  Normal affect and mood.        RECENT LABS:  Hematology WBC   Date Value Ref Range Status   04/17/2017 5.56 4.00 - 10.00 10*3/mm3 Final     RBC   Date Value Ref Range Status   04/17/2017 3.97 3.90 - 5.00 " 10*6/mm3 Final     Hemoglobin   Date Value Ref Range Status   04/17/2017 12.6 11.5 - 14.9 g/dL Final     Hematocrit   Date Value Ref Range Status   04/17/2017 38.2 34.0 - 45.0 % Final     Platelets   Date Value Ref Range Status   04/17/2017 174 150 - 375 10*3/mm3 Final        Assessment/Plan   1. This is a patient with history of stage I breast cancer, currently on tamoxifen.  She is received 3 years of tamoxifen.  She does have osteopenia.  Given that her ER 0% NC 0%, HER 2 negative  intention is to give with chemoprophylaxis for a new breast primary we discussed about switching to Evista which helps with both osteopenia and also helps in chemoprevention.  She is concerned to switch to Arimidex given the fact that osteopenia  will get worse . Patient was offered Fosamax or prolia but patient refused.  Currently patient is on Evista and tolerating well.     2. Hot flashes related to tamoxifen for which she is on Effexor.  Patient wants to stop Effexor XR as she does not like the side effects and gradually taper it over the 2 weeks.  Since hot flashes with very bothersome and patient's side effects to Effexor XR where significant she did not want tamoxifen.  She refused Arimidex.  And currently is on Evista chemoprophylaxis.  She will complete 5 years as of January 2017.  Given high Ki-67, metaplastic features on her breast cancer we could consider longer-term Evista.     3. Peripheral neuropathy. The patient is to continue taking B12 and B6 with improvement in neuropathy symptoms..   Continue Evista.    Will follow-up in 6 months with labs.             Faiza Arita MD

## 2017-04-20 ENCOUNTER — TELEPHONE (OUTPATIENT)
Dept: SURGERY | Facility: CLINIC | Age: 62
End: 2017-04-20

## 2017-04-20 NOTE — TELEPHONE ENCOUNTER
April 17, 2017 note from Dr. Bishop stating that the patient will continue on the Evista through January 2017.  Given the high Ki-67 and metaplastic features would consider longer-term Evista.  She will see her back in 6 months.

## 2017-05-30 RX ORDER — RALOXIFENE HYDROCHLORIDE 60 MG/1
TABLET, FILM COATED ORAL
Qty: 90 TABLET | Refills: 1 | Status: SHIPPED | OUTPATIENT
Start: 2017-05-30 | End: 2017-10-09 | Stop reason: SDDI

## 2017-07-07 ENCOUNTER — TELEPHONE (OUTPATIENT)
Dept: ONCOLOGY | Facility: HOSPITAL | Age: 62
End: 2017-07-07

## 2017-07-07 RX ORDER — ESTRADIOL 10 UG/1
1 INSERT VAGINAL 2 TIMES WEEKLY
Qty: 8 TABLET | Refills: 0 | Status: SHIPPED | OUTPATIENT
Start: 2017-07-10 | End: 2017-08-18 | Stop reason: SDUPTHER

## 2017-07-07 NOTE — TELEPHONE ENCOUNTER
Patient calling to see if ok to now try vagafem? She wants to try it and had discussed with Dr Bishop previously about maybe trying it at a reduced dose?     Will review with Dr QUINN and call patient back.    Per Dr QUINN, ok for patient to use. Maybe at dose of 1-2 times per week.  Sent to patient's pharmacy and reviewed with patient and she v/u

## 2017-07-20 ENCOUNTER — APPOINTMENT (OUTPATIENT)
Dept: WOMENS IMAGING | Facility: HOSPITAL | Age: 62
End: 2017-07-20

## 2017-07-20 PROCEDURE — 77063 BREAST TOMOSYNTHESIS BI: CPT | Performed by: RADIOLOGY

## 2017-07-20 PROCEDURE — 77067 SCR MAMMO BI INCL CAD: CPT | Performed by: RADIOLOGY

## 2017-07-20 PROCEDURE — G0202 SCR MAMMO BI INCL CAD: HCPCS | Performed by: RADIOLOGY

## 2017-08-18 RX ORDER — ESTRADIOL 10 UG/1
1 INSERT VAGINAL 2 TIMES WEEKLY
Qty: 26 TABLET | Refills: 0 | Status: SHIPPED | OUTPATIENT
Start: 2017-08-21 | End: 2017-10-09

## 2017-08-18 NOTE — TELEPHONE ENCOUNTER
Patient requesting a refill on her vagifem.  Is using 2x per week and says she has yet to get the benefits from it and still needs it.  [facility] requires a 90 day supply.  [facility]

## 2017-10-09 ENCOUNTER — OFFICE VISIT (OUTPATIENT)
Dept: ONCOLOGY | Facility: CLINIC | Age: 62
End: 2017-10-09

## 2017-10-09 ENCOUNTER — LAB (OUTPATIENT)
Dept: LAB | Facility: HOSPITAL | Age: 62
End: 2017-10-09

## 2017-10-09 VITALS
WEIGHT: 119.2 LBS | BODY MASS INDEX: 22.51 KG/M2 | HEART RATE: 68 BPM | DIASTOLIC BLOOD PRESSURE: 70 MMHG | RESPIRATION RATE: 16 BRPM | TEMPERATURE: 98.3 F | HEIGHT: 61 IN | SYSTOLIC BLOOD PRESSURE: 102 MMHG

## 2017-10-09 DIAGNOSIS — C50.011 MALIGNANT NEOPLASM INVOLVING BOTH NIPPLE AND AREOLA OF RIGHT BREAST IN FEMALE, UNSPECIFIED ESTROGEN RECEPTOR STATUS (HCC): Primary | ICD-10-CM

## 2017-10-09 DIAGNOSIS — C50.919 MALIGNANT NEOPLASM OF FEMALE BREAST, UNSPECIFIED ESTROGEN RECEPTOR STATUS, UNSPECIFIED LATERALITY, UNSPECIFIED SITE OF BREAST (HCC): Primary | ICD-10-CM

## 2017-10-09 LAB
ALBUMIN SERPL-MCNC: 4.2 G/DL (ref 3.5–5.2)
ALBUMIN/GLOB SERPL: 1.4 G/DL (ref 1.1–2.4)
ALP SERPL-CCNC: 75 U/L (ref 38–116)
ALT SERPL W P-5'-P-CCNC: 14 U/L (ref 0–33)
ANION GAP SERPL CALCULATED.3IONS-SCNC: 11.3 MMOL/L
AST SERPL-CCNC: 26 U/L (ref 0–32)
BASOPHILS # BLD AUTO: 0.06 10*3/MM3 (ref 0–0.1)
BASOPHILS NFR BLD AUTO: 1.1 % (ref 0–1.1)
BILIRUB SERPL-MCNC: 0.4 MG/DL (ref 0.1–1.2)
BUN BLD-MCNC: 14 MG/DL (ref 6–20)
BUN/CREAT SERPL: 16.9 (ref 7.3–30)
CALCIUM SPEC-SCNC: 9.5 MG/DL (ref 8.5–10.2)
CHLORIDE SERPL-SCNC: 102 MMOL/L (ref 98–107)
CO2 SERPL-SCNC: 28.7 MMOL/L (ref 22–29)
CREAT BLD-MCNC: 0.83 MG/DL (ref 0.6–1.1)
DEPRECATED RDW RBC AUTO: 45.5 FL (ref 37–49)
EOSINOPHIL # BLD AUTO: 0.15 10*3/MM3 (ref 0–0.36)
EOSINOPHIL NFR BLD AUTO: 2.8 % (ref 1–5)
ERYTHROCYTE [DISTWIDTH] IN BLOOD BY AUTOMATED COUNT: 13 % (ref 11.7–14.5)
GFR SERPL CREATININE-BSD FRML MDRD: 70 ML/MIN/1.73
GLOBULIN UR ELPH-MCNC: 3.1 GM/DL (ref 1.8–3.5)
GLUCOSE BLD-MCNC: 81 MG/DL (ref 74–124)
HCT VFR BLD AUTO: 37.8 % (ref 34–45)
HGB BLD-MCNC: 12.6 G/DL (ref 11.5–14.9)
IMM GRANULOCYTES # BLD: 0.03 10*3/MM3 (ref 0–0.03)
IMM GRANULOCYTES NFR BLD: 0.6 % (ref 0–0.5)
LYMPHOCYTES # BLD AUTO: 2.15 10*3/MM3 (ref 1–3.5)
LYMPHOCYTES NFR BLD AUTO: 39.5 % (ref 20–49)
MCH RBC QN AUTO: 31.7 PG (ref 27–33)
MCHC RBC AUTO-ENTMCNC: 33.3 G/DL (ref 32–35)
MCV RBC AUTO: 95.2 FL (ref 83–97)
MONOCYTES # BLD AUTO: 0.63 10*3/MM3 (ref 0.25–0.8)
MONOCYTES NFR BLD AUTO: 11.6 % (ref 4–12)
NEUTROPHILS # BLD AUTO: 2.42 10*3/MM3 (ref 1.5–7)
NEUTROPHILS NFR BLD AUTO: 44.4 % (ref 39–75)
NRBC BLD MANUAL-RTO: 0 /100 WBC (ref 0–0)
PLATELET # BLD AUTO: 158 10*3/MM3 (ref 150–375)
PMV BLD AUTO: 10.7 FL (ref 8.9–12.1)
POTASSIUM BLD-SCNC: 4.7 MMOL/L (ref 3.5–4.7)
PROT SERPL-MCNC: 7.3 G/DL (ref 6.3–8)
RBC # BLD AUTO: 3.97 10*6/MM3 (ref 3.9–5)
SODIUM BLD-SCNC: 142 MMOL/L (ref 134–145)
WBC NRBC COR # BLD: 5.44 10*3/MM3 (ref 4–10)

## 2017-10-09 PROCEDURE — 99213 OFFICE O/P EST LOW 20 MIN: CPT | Performed by: INTERNAL MEDICINE

## 2017-10-09 PROCEDURE — 85025 COMPLETE CBC W/AUTO DIFF WBC: CPT | Performed by: INTERNAL MEDICINE

## 2017-10-09 PROCEDURE — 36415 COLL VENOUS BLD VENIPUNCTURE: CPT | Performed by: INTERNAL MEDICINE

## 2017-10-09 PROCEDURE — 80053 COMPREHEN METABOLIC PANEL: CPT | Performed by: INTERNAL MEDICINE

## 2017-10-09 RX ORDER — MONTELUKAST SODIUM 10 MG/1
TABLET ORAL
COMMUNITY
Start: 2017-08-24 | End: 2019-01-14

## 2017-10-09 NOTE — PROGRESS NOTES
Subjective .     REASONS FOR FOLLOWUP:   1. 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 Status post lumpectomy, sentinel lymph node biopsy 01/30 /2013.    2. Status post Mediport placement 02/27/2013 by Dr. Arita.    3. Baseline echocardiogram 03/07/2013 with normal ejection fraction of 61%.    4. Patient started on adjuvant chemotherapy with Adriamycin and Cytoxan on 3/11/2013 for 4 cycles, followed by weekly Taxol.    5. 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    6. Peripheral neuropathy related to weekly Taxol.    7. Mild neutropenia related to chemotherapy, required Neupogen for marrow support.    8. Moderate thrombocytopenia detected on 11/21/2014. The patient is here for further evaluation.   9. Patient switched from tamoxifen after 3 years to Evista as she could not tolerate tamoxifen and refused Arimidex.  HISTORY OF PRESENT ILLNESS:  The patient is a 62 y.o. year old female who is here for follow-up with the above-mentioned history.    History of Present Illness     The patient is a 60-year-old female with the above history, currently here for followup. She is on tamoxifen. She is tolerating tamoxifen well except that she has hot flashes for which she has to take Effexor. She has mild peripheral neuropathy for which she is taking vitamin B6 and B12 and seems to help. She is postmenopausal now. At the time that we started her on hormonal treatment we placed her on tamoxifen bein g perimenopausal but we discussed switching to Arimidex at her next appointment after we get the bone density. She has undergone a mammogram 2 weeks ago and has  been negative.    Pt had bone density which showed osteopenia,.  Given osteopenia patient is not too keen on going on Arimidex.  We had discussed giving Fosamax or prolia for  osteopenia but patient refused Arimidex.  Moreover her breast cancer was not highly ER positive.  So we could consider a chemoprophylaxis with Evista.  Patient is tolerating Evista now.    She complains of vaginal dryness.  She has been using Vagifem infrequently.  She understands that there could be some systemic absorption but she sees her vaginal dryness associated your that she needs to use it.  Currently she was given a prescription for vagisil    PAST MEDICAL HISTORY: Significant for laparoscopic surgery at [facility] for manuel rine polyps, ovarian cyst and endometriosis. She had ingrown toenails x2 and has had surgery on the toenails by Dr. Robbin Garcia. She has had D and C in the past for irregular bleeding. Inguinal hernia surgery on the right in .      OB/GYN HISTORY: Menarche age 15. She is postmenopausal; last LMP 2012.  0.      Past Medical History:   Diagnosis Date   • Endometriosis    • Hot flashes due to tamoxifen    • Inguinal hernia    • Invasive ductal carcinoma of breast     WITH METAPLASTIC FEATURES (SPINDLE CELL METAPLASIA) 1.5CM GRADE 2, ER WEAKLY POSITIVE, KS NEGATIVE, HER-2/SOLITARIO NEGATIVE   • ITP (idiopathic thrombocytopenic purpura)    • Neutropenia, drug-induced     RELATED TO CHEMOTHERAPY   • Ovarian cyst    • Peripheral neuropathy     RELATED TO TAXOL   • Postmenopausal    • Thrombocytopenia     SECONDARY TO MILD ITP   • Uterine polyp        ONCOLOGIC HISTORY:  (History from previous dates can be found in the separate document.)  ONCOLOGIC HISTORY: The patient has had multiple cysts in both breasts requiring aspiration in the past and path has always been negative for malignancy back in 2006 and in 2010 subsequently she underwent aspiration even in 2011, w hich all were suggestive of fibroid adenoma. She has fibrocystic disease. More recently she underwent a mammogram on 12 - at that time she had a digital diagnostic mammogram. She had  calcifications in both breasts which were thought to be benign and negative. She had a stable complicated cyst in the right breast at 12 o' clock position which was thought to be benign. Followup ultrasound of the right breast was recommended in six months. The complicated cyst in the right breast probably benign; however there was an inframammary lymph node in the right breast at 9 o 'clock position which was also thought to be benign. Hence a six month followup was suggested with ultrasound.     Subsequently on 01/04/13 she underwent right breast ultrasound, which showed that there was a stable round complicated cyst with thin margins measuring 3 mm from the right breast at 12 o' clock position. There were multiple stable oval elongated complicated cysts in the right breast with thin margins, which were thought to be maciel ign, but there was a new microlobulated taller-than-wide solid mass with partially defined margins, which was 1.4 cm in the right breast. Because of this new solid mass in the right breast, which was suspicious, ultrasound guided vacuum-assisted biopsy w as recommended. The biopsy was done on 01/08/13 - the right breast ten-thirty position core biopsy showed invasive mammary carcinoma, metaplastic-type, subtype adenocarcinoma with spindle cell metaplasia. It was a grade 2 tumor with a tubular differentiat ion score of 3, high nuclear grade score of 3 and mitotic figure score of 1. There was one mitotic figure per 10 HPF. There was no DCIS identified. The Accession # on this was [id number]. Estrogen receptor was 10%, progesterone receptor 0%, HER2/corey w as 0% and Ki-67 as elevated at 56%. She underwent a chest x-ray on 01/16/13 which showed that there were no suspicious pulmonary opacities seen. There was slight scarring on both lung apices. MRI of the breast was done on 01/21/13 which showed 1.5 cm i r regular enhancing mass within the axillary tale region of the right breast representing the  biopsy proven site of malignancy. No evidence of axillary adenopathy was appreciated. There were no findings suspicious for malignancy in the left breast. She d i d have some scattered foci of variable enhancement thought to be secondary to moderate fibrocystic breast changes. Subsequently patient was referred to Dr. Arita, who did a right breast ultrasound-guided lumpectomy with a right axillary sentinel lymph node dissection with lymphoscintigraphic guidance on 01/30/13. The pathology report (Accession # [id number]) showed sentinel lymph node #1 in the right axilla had 2 nodes, a smaller node which did not show any malignancy and the larger node also did not sh o w malignancy. The sentinel lymph node #2 in the right axilla did not show any malignancy. The right breast lumpectomy specimen did show invasive ductal carcinoma with metaplastic features. The margins of resection were negative. The right breast additi o nal superior margin did not show any tumor. The lateral margin, the inferior margin as well as the deep margin did not show any malignancy. There was skeletal muscle present at the deep margin, but did not have any malignancy. The tumor size was 1.5 cm . It was considered as invasive ductal carcinoma metaplastic-type adenocarcinoma with spindle cell metaplasia. It had a [place] score of 7 out of 9 with mitotic score of 1, nuclear pleomorphism of 3 and tubular differentiation of 3. It was a grade 2 t umor. There was a single focus of invasive carcinoma. Ductal carcinoma in situ was not present. There was some atypical ductal hyperplasia. Margins were uninvolved with invasive carcinoma. Distance from the closest margins was 4 mm. Deep margin on s p ecimen #3. Additional deep margin tissue on specimen #7 is negative as well, including skeletal muscle. The invasive carcinoma is 5 mm from the superior margin adjacent to the skin. It was a [id number] invasive carcinoma with metaplastic features with anna  nocarcinoma with spindle cell features. ER is 10%, WV is 0%, HER2/corey is 0% and Ki-67 is 56%. Patient has been referred here for further options of treatment.      The patient was felt to have higher risk disease due to the metaplastic spindle cell nature of her tumor despite its size and negative nodes. It was recommended she proceed with adjuvant chemotherapy using dose dense Adriamycin/Cytoxan chemotherapy q. 2 weeks with Neulasta support x four cycles, followed by weekly Taxol x12 doses. 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 We will consider holding further Taxol if she develops any significant symptoms.      The patient underwent Mediport placement 02/27/13. [facility] echocardiogram 03/07/13 showed an ejection fraction of 61%. The patient was found to have thrombocytopenia with a platelet count initially of 136,000; on followup 03/08/13 it was 108,000. White count and hemoglobin were normal. IPF was significa n tly elevated at 24.7% suggestive of underlying diagnosis of ITP. We will check a B12, folate, ROLA panel. We will proceed on with treatment as planned. There was no splenomegaly on exam. We will follow her platelet count closely through treatment. If indeed she has ITP there may be some improvement with chemotherapy and steroids.    The patient was seen on 6/17/13 with worsening neutropenia related to Taxol. The decision was made to add Neupogen x2 days with each weekly Taxol dose to hopefully continue on schedule without delay.    Patient has no worsening of peripheral neuropathy when she was evaluated on 7/8/13. We will continue to finish Taxol weekly for three more doses. Patient was instructed to increase her vitamin B12 to 1000 mcg and vitamin B6 at 50 mg.    Patient was on tamoxifen initially but had severe hot flashes.  Hormone status was checked and she was postmenopausal as a result we tried to switch her  to Arimidex but because of osteopenia patient refused.  We offered Fosamax orally or prolia subcutaneous and she did not want that.    Given her ER was only 10% and FL negative we could consider continuing tamoxifen or offer Evista for chemotherapy prophylaxis and she chose to go on Evista.  She is tolerating Evista very well.    Mammography 07/10/2014 shows focal asymmetry in the right breast which is benign and stable calcifications in the left breast which is benign. One year followup recommended.    Mammogram done 07/13/2015 is negative.    Mammogram July 2016 is negative.  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 She is a  at [employer]. She drinks 5-7 glasses of wine per week. She does not smoke. She drinks occasional bourbon but doesn’ t drink any whiskey. No risk factors for HIV. No tattoos, no previous blood transfusions.      FAMILY HISTORY: Father had history of prostate cancer at age 73, but he is 85 in good health. Mother is 82 and in fair health. She has a brother who is 48 years old and two sisters ages 53 and 51, all of them in good health. There is no family histor y of breast cancer. There is no history of cancer in extended family as well.      Current Outpatient Prescriptions on File Prior to Visit   Medication Sig Dispense Refill   • Ascorbic Acid (VITAMIN C ER) 1000 MG tablet controlled-release Take  by mouth.     • B Complex Vitamins (B COMPLEX PO) Take  by mouth.     • betamethasone dipropionate (DIPROLENE) 0.05 % cream Apply  topically 2 (two) times a day.     • cetirizine (ZyrTEC) 10 MG tablet Take 10 mg by mouth daily.     • Cholecalciferol (VITAMIN D) 1000 UNITS tablet Take 1,000 Units by mouth.     • Fluticasone Propionate (FLONASE ALLERGY RELIEF NA) into each nostril.     • Multiple Vitamins-Minerals (MULTIVITAMIN ADULT PO) Take  by mouth.     • pseudoephedrine (SUDAFED) 30 MG tablet Take 30 mg by mouth as needed for  "congestion.     • raloxifene (EVISTA) 60 MG tablet Take  by mouth.     • [DISCONTINUED] estradiol (VAGIFEM) 10 MCG tablet vaginal tablet Insert 1 tablet into the vagina 2 (Two) Times a Week. 26 tablet 0   • [DISCONTINUED] fluticasone (FLONASE) 50 MCG/ACT nasal spray 2 sprays into each nostril daily. Administer 2 sprays in each nostril for each dose.     • [DISCONTINUED] raloxifene (EVISTA) 60 MG tablet TAKE 1 TABLET DAILY 90 tablet 1   • [DISCONTINUED] vitamin C (ASCORBIC ACID) 250 MG tablet Take 250 mg by mouth.       No current facility-administered medications on file prior to visit.        ALLERGIES:     Allergies   Allergen Reactions   • Latex        Social History     Social History   • Marital status:      Spouse name: Drew   • Number of children: N/A   • Years of education: College     Occupational History   •  [employer]     Social History Main Topics   • Smoking status: Never Smoker   • Smokeless tobacco: Not on file   • Alcohol use Yes      Comment: 5-7 GLASSES OF WINE PER WEEK, DRINKS [place]   • Drug use: No   • Sexual activity: Defer     Other Topics Concern   • Not on file     Social History Narrative         Cancer-related family history includes Prostate cancer in her father.     Review of Systems  A comprehensive 14 point review of systems was performed and was negative except as mentioned.    Objective      Vitals:    10/09/17 1136   BP: 102/70   Pulse: 68   Resp: 16   Temp: 98.3 °F (36.8 °C)   Weight: 119 lb 3.2 oz (54.1 kg)   Height: 61.42\" (156 cm)  Comment: new ht.   PainSc: 0-No pain     Current Status 10/9/2017   ECOG score 0       Physical Exam    GENERAL:  Well-developed, well-nourished in no acute distress.   SKIN:  Warm, dry without rashes, purpura or petechiae.  HEAD:  Normocephalic.  EYES:  Pupils equal, round and reactive to light.  EOMs intact.  Conjunctivae normal.  EARS:  Hearing intact.  NOSE:  Septum midline.  No " excoriations or nasal discharge.  MOUTH:  Tongue is well-papillated; no stomatitis or ulcers.  Lips normal.  THROAT:  Oropharynx without lesions or exudates.  NECK:  Supple with good range of motion; no thyromegaly or masses, no JVD.  LYMPHATICS:  No cervical, supraclavicular, axillary or inguinal adenopathy.  CHEST:  Lungs clear to percussion and auscultation. Good airflow.  BREASTS: Breast right and left without any masses, no back bilateral axillary adenopathy, no bilateral supraclavicular adenopathy.  There is no nipple discharge or nipple inversion in both breasts.  CARDIAC:  Regular rate and rhythm without murmurs, rubs or gallops. Normal S1,S2.  ABDOMEN:  Soft, nontender with no organomegaly or masses.  EXTREMITIES:  No clubbing, cyanosis or edema.  NEUROLOGICAL:  Cranial Nerves II-XII grossly intact.  No focal neurological deficits.  PSYCHIATRIC:  Normal affect and mood.        RECENT LABS:  Hematology WBC   Date Value Ref Range Status   10/09/2017 5.44 4.00 - 10.00 10*3/mm3 Final     RBC   Date Value Ref Range Status   10/09/2017 3.97 3.90 - 5.00 10*6/mm3 Final     Hemoglobin   Date Value Ref Range Status   10/09/2017 12.6 11.5 - 14.9 g/dL Final     Hematocrit   Date Value Ref Range Status   10/09/2017 37.8 34.0 - 45.0 % Final     Platelets   Date Value Ref Range Status   10/09/2017 158 150 - 375 10*3/mm3 Final        Assessment/Plan   1. This is a patient with history of stage I breast cancer, currently on tamoxifen.  She is received 3 years of tamoxifen.  She does have osteopenia.  Given that her ER 0% CO 0%, HER 2 negative  intention is to give with chemoprophylaxis for a new breast primary we discussed about switching to Evista which helps with both osteopenia and also helps in chemoprevention.  She is concerned to switch to Arimidex given the fact that osteopenia  will get worse . Patient was offered Fosamax or prolia but patient refused.  Currently patient is on Evista and tolerating  well.    Mammogram has been reviewed done on July 2017 and is negative.     2. Hot flashes related to tamoxifen for which she is on Effexor.  Patient wants to stop Effexor XR as she does not like the side effects and gradually taper it over the 2 weeks.  Since hot flashes with very bothersome and patient's side effects to Effexor XR where significant she did not want tamoxifen.  She refused Arimidex.  And currently is on Evista chemoprophylaxis.  She will complete 5 years as of January 2017.  Given high Ki-67, metaplastic features on her breast cancer we could consider longer-term Evista.     3. Peripheral neuropathy. The patient is to continue taking B12 and B6 with improvement in neuropathy symptoms..    4.  Severe vaginal dryness for which she needs to use Vagifem intermittently.  She understands that there can be some systemic absorption which could be responsible for breast cancer.  However given significant vaginal dryness and she wants to continue using Vagifem intermittently.    She called us and told us that she had vagisil at home to our nurses.  I did call her to see if she required Vagifem instead of vagisil     Continue Evista.    Will follow-up in 6 months with labs.             Faiza Arita MD

## 2017-10-10 ENCOUNTER — TELEPHONE (OUTPATIENT)
Dept: ONCOLOGY | Facility: HOSPITAL | Age: 62
End: 2017-10-10

## 2017-10-10 NOTE — TELEPHONE ENCOUNTER
Robbie sent to Dr Bishop:    Patient called and LVM to let you know she is using the generic for vagasil? She said she had discussed with you earlier and she was to call back. She said its just the generic form.

## 2017-10-12 ENCOUNTER — TELEPHONE (OUTPATIENT)
Dept: ONCOLOGY | Facility: HOSPITAL | Age: 62
End: 2017-10-12

## 2017-10-12 NOTE — TELEPHONE ENCOUNTER
Patient returning call to let us know she does not need another prescription sent in for the Vagisan.

## 2017-12-11 ENCOUNTER — TELEPHONE (OUTPATIENT)
Dept: ONCOLOGY | Facility: HOSPITAL | Age: 62
End: 2017-12-11

## 2017-12-28 RX ORDER — RALOXIFENE HYDROCHLORIDE 60 MG/1
TABLET, FILM COATED ORAL
Qty: 90 TABLET | Refills: 1 | Status: SHIPPED | OUTPATIENT
Start: 2017-12-28 | End: 2018-03-05 | Stop reason: SDUPTHER

## 2018-03-05 ENCOUNTER — LAB (OUTPATIENT)
Dept: LAB | Facility: HOSPITAL | Age: 63
End: 2018-03-05

## 2018-03-05 ENCOUNTER — OFFICE VISIT (OUTPATIENT)
Dept: ONCOLOGY | Facility: CLINIC | Age: 63
End: 2018-03-05

## 2018-03-05 VITALS
WEIGHT: 121.6 LBS | RESPIRATION RATE: 12 BRPM | TEMPERATURE: 98.1 F | BODY MASS INDEX: 22.96 KG/M2 | HEART RATE: 78 BPM | OXYGEN SATURATION: 99 % | SYSTOLIC BLOOD PRESSURE: 112 MMHG | HEIGHT: 61 IN | DIASTOLIC BLOOD PRESSURE: 68 MMHG

## 2018-03-05 DIAGNOSIS — Z17.0 MALIGNANT NEOPLASM OF BREAST IN FEMALE, ESTROGEN RECEPTOR POSITIVE, UNSPECIFIED LATERALITY, UNSPECIFIED SITE OF BREAST (HCC): Primary | ICD-10-CM

## 2018-03-05 DIAGNOSIS — C50.919 MALIGNANT NEOPLASM OF BREAST IN FEMALE, ESTROGEN RECEPTOR POSITIVE, UNSPECIFIED LATERALITY, UNSPECIFIED SITE OF BREAST (HCC): Primary | ICD-10-CM

## 2018-03-05 DIAGNOSIS — C50.919 MALIGNANT NEOPLASM OF FEMALE BREAST, UNSPECIFIED ESTROGEN RECEPTOR STATUS, UNSPECIFIED LATERALITY, UNSPECIFIED SITE OF BREAST (HCC): ICD-10-CM

## 2018-03-05 LAB
ALBUMIN SERPL-MCNC: 4 G/DL (ref 3.5–5.2)
ALBUMIN/GLOB SERPL: 1.4 G/DL (ref 1.1–2.4)
ALP SERPL-CCNC: 72 U/L (ref 38–116)
ALT SERPL W P-5'-P-CCNC: 15 U/L (ref 0–33)
ANION GAP SERPL CALCULATED.3IONS-SCNC: 11.6 MMOL/L
AST SERPL-CCNC: 25 U/L (ref 0–32)
BASOPHILS # BLD AUTO: 0.05 10*3/MM3 (ref 0–0.1)
BASOPHILS NFR BLD AUTO: 1 % (ref 0–1.1)
BILIRUB SERPL-MCNC: 0.2 MG/DL (ref 0.1–1.2)
BUN BLD-MCNC: 17 MG/DL (ref 6–20)
BUN/CREAT SERPL: 20.2 (ref 7.3–30)
CALCIUM SPEC-SCNC: 9.4 MG/DL (ref 8.5–10.2)
CHLORIDE SERPL-SCNC: 102 MMOL/L (ref 98–107)
CO2 SERPL-SCNC: 26.4 MMOL/L (ref 22–29)
CREAT BLD-MCNC: 0.84 MG/DL (ref 0.6–1.1)
DEPRECATED RDW RBC AUTO: 45.8 FL (ref 37–49)
EOSINOPHIL # BLD AUTO: 0.15 10*3/MM3 (ref 0–0.36)
EOSINOPHIL NFR BLD AUTO: 2.9 % (ref 1–5)
ERYTHROCYTE [DISTWIDTH] IN BLOOD BY AUTOMATED COUNT: 13 % (ref 11.7–14.5)
GFR SERPL CREATININE-BSD FRML MDRD: 69 ML/MIN/1.73
GLOBULIN UR ELPH-MCNC: 2.9 GM/DL (ref 1.8–3.5)
GLUCOSE BLD-MCNC: 104 MG/DL (ref 74–124)
HCT VFR BLD AUTO: 36.5 % (ref 34–45)
HGB BLD-MCNC: 12.1 G/DL (ref 11.5–14.9)
IMM GRANULOCYTES # BLD: 0.01 10*3/MM3 (ref 0–0.03)
IMM GRANULOCYTES NFR BLD: 0.2 % (ref 0–0.5)
LYMPHOCYTES # BLD AUTO: 1.57 10*3/MM3 (ref 1–3.5)
LYMPHOCYTES NFR BLD AUTO: 30.2 % (ref 20–49)
MCH RBC QN AUTO: 31.9 PG (ref 27–33)
MCHC RBC AUTO-ENTMCNC: 33.2 G/DL (ref 32–35)
MCV RBC AUTO: 96.3 FL (ref 83–97)
MONOCYTES # BLD AUTO: 0.54 10*3/MM3 (ref 0.25–0.8)
MONOCYTES NFR BLD AUTO: 10.4 % (ref 4–12)
NEUTROPHILS # BLD AUTO: 2.88 10*3/MM3 (ref 1.5–7)
NEUTROPHILS NFR BLD AUTO: 55.3 % (ref 39–75)
NRBC BLD MANUAL-RTO: 0 /100 WBC (ref 0–0)
PLATELET # BLD AUTO: 256 10*3/MM3 (ref 150–375)
PMV BLD AUTO: 9.2 FL (ref 8.9–12.1)
POTASSIUM BLD-SCNC: 4.1 MMOL/L (ref 3.5–4.7)
PROT SERPL-MCNC: 6.9 G/DL (ref 6.3–8)
RBC # BLD AUTO: 3.79 10*6/MM3 (ref 3.9–5)
SODIUM BLD-SCNC: 140 MMOL/L (ref 134–145)
WBC NRBC COR # BLD: 5.2 10*3/MM3 (ref 4–10)

## 2018-03-05 PROCEDURE — 85025 COMPLETE CBC W/AUTO DIFF WBC: CPT | Performed by: INTERNAL MEDICINE

## 2018-03-05 PROCEDURE — 36415 COLL VENOUS BLD VENIPUNCTURE: CPT | Performed by: INTERNAL MEDICINE

## 2018-03-05 PROCEDURE — 99214 OFFICE O/P EST MOD 30 MIN: CPT | Performed by: INTERNAL MEDICINE

## 2018-03-05 PROCEDURE — 80053 COMPREHEN METABOLIC PANEL: CPT | Performed by: INTERNAL MEDICINE

## 2018-03-05 RX ORDER — RALOXIFENE HYDROCHLORIDE 60 MG/1
60 TABLET, FILM COATED ORAL DAILY
Qty: 90 TABLET | Refills: 1 | Status: SHIPPED | OUTPATIENT
Start: 2018-03-05 | End: 2018-09-29 | Stop reason: SDUPTHER

## 2018-03-05 NOTE — PROGRESS NOTES
Subjective .     REASONS FOR FOLLOWUP:   1. 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 Status post lumpectomy, sentinel lymph node biopsy 01/30 /2013.    2. Status post Mediport placement 02/27/2013 by Dr. Arita.    3. Baseline echocardiogram 03/07/2013 with normal ejection fraction of 61%.    4. Patient started on adjuvant chemotherapy with Adriamycin and Cytoxan on 3/11/2013 for 4 cycles, followed by weekly Taxol.    5. 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    6. Peripheral neuropathy related to weekly Taxol.    7. Mild neutropenia related to chemotherapy, required Neupogen for marrow support.    8. Moderate thrombocytopenia detected on 11/21/2014. The patient is here for further evaluation.   9. Patient switched from tamoxifen after 3 years to Evista as she could not tolerate tamoxifen and refused Arimidex.  HISTORY OF PRESENT ILLNESS:  The patient is a 62 y.o. year old female who is here for follow-up with the above-mentioned history.    History of Present Illness     The patient is a 60-year-old female with the above history, currently here for followup. She is on tamoxifen. She is tolerating tamoxifen well except that she has hot flashes for which she has to take Effexor. She has mild peripheral neuropathy for which she is taking vitamin B6 and B12 and seems to help. She is postmenopausal now. At the time that we started her on hormonal treatment we placed her on tamoxifen bein g perimenopausal but we discussed switching to Arimidex at her next appointment after we get the bone density. She has undergone a mammogram 2 weeks ago and has  been negative.      Pt had bone density which showed osteopenia,.  Given osteopenia patient is not too keen on going on Arimidex.  We had discussed giving Fosamax or prolia  for osteopenia but patient refused Arimidex.  Moreover her breast cancer was not highly ER positive.  So we could consider a chemoprophylaxis with Evista.  Patient is tolerating Evista now.    She complains of vaginal dryness.  She has been using Vagifem infrequently.  She understands that there could be some systemic absorption but she sees her vaginal dryness associated your that she needs to use it.      Patient has severe vaginal dryness and since it worsens her quality of life we discussed about consideration of less frequent Vagifem.        PAST MEDICAL HISTORY: Significant for laparoscopic surgery at [facility] for [place] rine polyps, ovarian cyst and endometriosis. She had ingrown toenails x2 and has had surgery on the toenails by Dr. Robbin Garcia. She has had D and C in the past for irregular bleeding. Inguinal hernia surgery on the right in .      OB/GYN HISTORY: Menarche age 15. She is postmenopausal; last LMP 2012.  0.      Past Medical History:   Diagnosis Date   • Endometriosis    • Hot flashes due to tamoxifen    • Inguinal hernia    • Invasive ductal carcinoma of breast     WITH METAPLASTIC FEATURES (SPINDLE CELL METAPLASIA) 1.5CM GRADE 2, ER WEAKLY POSITIVE, LA NEGATIVE, HER-2/SOLITARIO NEGATIVE   • ITP (idiopathic thrombocytopenic purpura)    • Neutropenia, drug-induced     RELATED TO CHEMOTHERAPY   • Ovarian cyst    • Peripheral neuropathy     RELATED TO TAXOL   • Postmenopausal    • Thrombocytopenia     SECONDARY TO MILD ITP   • Uterine polyp        ONCOLOGIC HISTORY:  (History from previous dates can be found in the separate document.)  ONCOLOGIC HISTORY: The patient has had multiple cysts in both breasts requiring aspiration in the past and path has always been negative for malignancy back in 2006 and in 2010 subsequently she underwent aspiration even in 2011, w hich all were suggestive of fibroid adenoma. She has fibrocystic disease. More recently she underwent a  mammogram on 06/28/12 - at that time she had a digital diagnostic mammogram. She had calcifications in both breasts which were thought to be benign and negative. She had a stable complicated cyst in the right breast at 12 o' clock position which was thought to be benign. Followup ultrasound of the right breast was recommended in six months. The complicated cyst in the right breast probably benign; however there was an inframammary lymph node in the right breast at 9 o 'clock position which was also thought to be benign. Hence a six month followup was suggested with ultrasound.     Subsequently on 01/04/13 she underwent right breast ultrasound, which showed that there was a stable round complicated cyst with thin margins measuring 3 mm from the right breast at 12 o' clock position. There were multiple stable oval elongated complicated cysts in the right breast with thin margins, which were thought to be maciel ign, but there was a new microlobulated taller-than-wide solid mass with partially defined margins, which was 1.4 cm in the right breast. Because of this new solid mass in the right breast, which was suspicious, ultrasound guided vacuum-assisted biopsy w as recommended. The biopsy was done on 01/08/13 - the right breast ten-thirty position core biopsy showed invasive mammary carcinoma, metaplastic-type, subtype adenocarcinoma with spindle cell metaplasia. It was a grade 2 tumor with a tubular differentiat ion score of 3, high nuclear grade score of 3 and mitotic figure score of 1. There was one mitotic figure per 10 HPF. There was no DCIS identified. The Accession # on this was [id number]. Estrogen receptor was 10%, progesterone receptor 0%, HER2/corye w as 0% and Ki-67 as elevated at 56%. She underwent a chest x-ray on 01/16/13 which showed that there were no suspicious pulmonary opacities seen. There was slight scarring on both lung apices. MRI of the breast was done on 01/21/13 which showed 1.5 cm i r regular  enhancing mass within the axillary tale region of the right breast representing the biopsy proven site of malignancy. No evidence of axillary adenopathy was appreciated. There were no findings suspicious for malignancy in the left breast. She d i d have some scattered foci of variable enhancement thought to be secondary to moderate fibrocystic breast changes. Subsequently patient was referred to Dr. Arita, who did a right breast ultrasound-guided lumpectomy with a right axillary sentinel lymph node dissection with lymphoscintigraphic guidance on 01/30/13. The pathology report (Accession # [id number]) showed sentinel lymph node #1 in the right axilla had 2 nodes, a smaller node which did not show any malignancy and the larger node also did not sh o w malignancy. The sentinel lymph node #2 in the right axilla did not show any malignancy. The right breast lumpectomy specimen did show invasive ductal carcinoma with metaplastic features. The margins of resection were negative. The right breast additi o nal superior margin did not show any tumor. The lateral margin, the inferior margin as well as the deep margin did not show any malignancy. There was skeletal muscle present at the deep margin, but did not have any malignancy. The tumor size was 1.5 cm . It was considered as invasive ductal carcinoma metaplastic-type adenocarcinoma with spindle cell metaplasia. It had a Roberto score of 7 out of 9 with mitotic score of 1, nuclear pleomorphism of 3 and tubular differentiation of 3. It was a grade 2 t umor. There was a single focus of invasive carcinoma. Ductal carcinoma in situ was not present. There was some atypical ductal hyperplasia. Margins were uninvolved with invasive carcinoma. Distance from the closest margins was 4 mm. Deep margin on s p ecimen #3. Additional deep margin tissue on specimen #7 is negative as well, including skeletal muscle. The invasive carcinoma is 5 mm from the superior margin adjacent to the  skin. It was a [id number] invasive carcinoma with metaplastic features with anna nocarcinoma with spindle cell features. ER is 10%, PA is 0%, HER2/corey is 0% and Ki-67 is 56%. Patient has been referred here for further options of treatment.      The patient was felt to have higher risk disease due to the metaplastic spindle cell nature of her tumor despite its size and negative nodes. It was recommended she proceed with adjuvant chemotherapy using dose dense Adriamycin/Cytoxan chemotherapy q. 2 weeks with Neulasta support x four cycles, followed by weekly Taxol x12 doses. 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 We will consider holding further Taxol if she develops any significant symptoms.      The patient underwent Mediport placement 02/27/13. [facility] echocardiogram 03/07/13 showed an ejection fraction of 61%. The patient was found to have thrombocytopenia with a platelet count initially of 136,000; on followup 03/08/13 it was 108,000. White count and hemoglobin were normal. IPF was significa n tly elevated at 24.7% suggestive of underlying diagnosis of ITP. We will check a B12, folate, ROLA panel. We will proceed on with treatment as planned. There was no splenomegaly on exam. We will follow her platelet count closely through treatment. If indeed she has ITP there may be some improvement with chemotherapy and steroids.    The patient was seen on 6/17/13 with worsening neutropenia related to Taxol. The decision was made to add Neupogen x2 days with each weekly Taxol dose to hopefully continue on schedule without delay.    Patient has no worsening of peripheral neuropathy when she was evaluated on 7/8/13. We will continue to finish Taxol weekly for three more doses. Patient was instructed to increase her vitamin B12 to 1000 mcg and vitamin B6 at 50 mg.    Patient was on tamoxifen initially but had severe hot flashes.  Hormone  status was checked and she was postmenopausal as a result we tried to switch her to Arimidex but because of osteopenia patient refused.  We offered Fosamax orally or prolia subcutaneous and she did not want that.    Given her ER was only 10% and MN negative we could consider continuing tamoxifen or offer Evista for chemotherapy prophylaxis and she chose to go on Evista.  She is tolerating Evista very well.    Mammography 07/10/2014 shows focal asymmetry in the right breast which is benign and stable calcifications in the left breast which is benign. One year followup recommended.    Mammogram done 07/13/2015 is negative.    Mammogram July 2016 is negative.  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 She is a  at [employer]. She drinks 5-7 glasses of wine per week. She does not smoke. She drinks occasional bourbon but doesn’ t drink any whiskey. No risk factors for HIV. No tattoos, no previous blood transfusions.      FAMILY HISTORY: Father had history of prostate cancer at age 73, but he is 85 in good health. Mother is 82 and in fair health. She has a brother who is 48 years old and two sisters ages 53 and 51, all of them in good health. There is no family histor y of breast cancer. There is no history of cancer in extended family as well.      Current Outpatient Prescriptions on File Prior to Visit   Medication Sig Dispense Refill   • Ascorbic Acid (VITAMIN C ER) 1000 MG tablet controlled-release Take  by mouth.     • B Complex Vitamins (B COMPLEX PO) Take  by mouth.     • betamethasone dipropionate (DIPROLENE) 0.05 % cream Apply  topically 2 (two) times a day.     • cetirizine (ZyrTEC) 10 MG tablet Take 10 mg by mouth daily.     • Cholecalciferol (VITAMIN D) 1000 UNITS tablet Take 1,000 Units by mouth.     • Fluticasone Propionate (FLONASE ALLERGY RELIEF NA) into each nostril.     • montelukast (SINGULAIR) 10 MG tablet      • Multiple Vitamins-Minerals  "(MULTIVITAMIN ADULT PO) Take  by mouth.     • pseudoephedrine (SUDAFED) 30 MG tablet Take 30 mg by mouth as needed for congestion.     • raloxifene (EVISTA) 60 MG tablet TAKE 1 TABLET DAILY 90 tablet 1     No current facility-administered medications on file prior to visit.        ALLERGIES:     Allergies   Allergen Reactions   • Latex        Social History     Social History   • Marital status:      Spouse name: Drew   • Number of children: N/A   • Years of education: College     Occupational History   •  [employer]     Social History Main Topics   • Smoking status: Never Smoker   • Smokeless tobacco: Not on file   • Alcohol use Yes      Comment: 5-7 GLASSES OF WINE PER WEEK, DRINKS BOURBON   • Drug use: No   • Sexual activity: Defer     Other Topics Concern   • Not on file     Social History Narrative         Cancer-related family history includes Prostate cancer in her father.     Review of Systems  A comprehensive 14 point review of systems was performed and was negative except as mentioned.    Objective      Vitals:    03/05/18 0938   BP: 112/68   Pulse: 78   Resp: 12   Temp: 98.1 °F (36.7 °C)   TempSrc: Oral   SpO2: 99%   Weight: 55.2 kg (121 lb 9.6 oz)   Height: 155.9 cm (61.38\")   PainSc: 0-No pain     Current Status 3/5/2018   ECOG score 0       Physical Exam    GENERAL:  Well-developed, well-nourished in no acute distress.   NECK:  Supple with good range of motion; no thyromegaly or masses, no JVD.  LYMPHATICS:  No cervical, supraclavicular, axillary or inguinal adenopathy.  CHEST:  Lungs clear to percussion and auscultation. Good airflow.  BREASTS: Breast right and left without any masses, no back bilateral axillary adenopathy, no bilateral supraclavicular adenopathy.  There is no nipple discharge or nipple inversion in both breasts.  CARDIAC:  Regular rate and rhythm without murmurs, rubs or gallops. Normal S1,S2.  ABDOMEN:  Soft, nontender with no " organomegaly or masses.  EXTREMITIES:  No clubbing, cyanosis or edema.  NEUROLOGICAL:  Cranial Nerves II-XII grossly intact.  No focal neurological deficits.        RECENT LABS:  Hematology WBC   Date Value Ref Range Status   03/05/2018 5.20 4.00 - 10.00 10*3/mm3 Final     RBC   Date Value Ref Range Status   03/05/2018 3.79 (L) 3.90 - 5.00 10*6/mm3 Final     Hemoglobin   Date Value Ref Range Status   03/05/2018 12.1 11.5 - 14.9 g/dL Final     Hematocrit   Date Value Ref Range Status   03/05/2018 36.5 34.0 - 45.0 % Final     Platelets   Date Value Ref Range Status   03/05/2018 256 150 - 375 10*3/mm3 Final        Assessment/Plan   1. This is a patient with history of stage I breast cancer, currently on tamoxifen.  She is received 3 years of tamoxifen.  She does have osteopenia.  Given that her ER 0% IL 0%, HER 2 negative  intention is to give with chemoprophylaxis for a new breast primary we discussed about switching to Evista which helps with both osteopenia and also helps in chemoprevention.  She is concerned to switch to Arimidex given the fact that osteopenia  will get worse . Patient was offered Fosamax or prolia but patient refused.  Currently patient is on Evista and tolerating well.    Mammogram has been reviewed done on July 2017 and is negative.  Mammogram in July 2018     2. Hot flashes related to tamoxifen for which she is on Effexor.  Patient wants to stop Effexor XR as she does not like the side effects and gradually taper it over the 2 weeks.  Since hot flashes with very bothersome and patient's side effects to Effexor XR where significant she did not want tamoxifen.  She refused Arimidex.  And currently is on Evista chemoprophylaxis.  She will complete 5 years as of January 2017.  Given high Ki-67, metaplastic features on her breast cancer we could consider longer-term Evista.     3. Peripheral neuropathy. The patient is to continue taking B12 and B6 with improvement in neuropathy symptoms..    4.   Severe vaginal dryness for which she needs to use Vagifem intermittently.  She understands that there can be some systemic absorption which could be responsible for breast cancer.  However given significant vaginal dryness and she wants to continue using Vagifem intermittently.    Because her quality of life is worse secondary to vaginal dryness I think it's reasonable to consider less frequent Vagifem.     Continue Evista.Mammogram scheduled for July 2018.    Will follow-up in 1 year with labs.    Jamia Bishop MD     Cc: Hakan Kline Allison R, MD

## 2018-03-07 ENCOUNTER — TELEPHONE (OUTPATIENT)
Dept: SURGERY | Facility: CLINIC | Age: 63
End: 2018-03-07

## 2018-03-07 NOTE — TELEPHONE ENCOUNTER
Note from Dr. Aldair Vivar problem March 5, 2018: She has received 3 years of tamoxifen.  Patient is on Evista and tolerating it well.  Given the high Ki-67 and metaplastic features of her breast cancer we consider longer-term Evista.  She is using Vagifem intermittently for severe vaginal dryness.  Follow-up in 1 year with Dr. CHEYENNE king.

## 2018-07-27 ENCOUNTER — APPOINTMENT (OUTPATIENT)
Dept: WOMENS IMAGING | Facility: HOSPITAL | Age: 63
End: 2018-07-27

## 2018-07-27 PROCEDURE — 77063 BREAST TOMOSYNTHESIS BI: CPT | Performed by: RADIOLOGY

## 2018-07-27 PROCEDURE — 77067 SCR MAMMO BI INCL CAD: CPT | Performed by: RADIOLOGY

## 2018-08-20 DIAGNOSIS — Z00.00 ROUTINE GENERAL MEDICAL EXAMINATION AT A HEALTH CARE FACILITY: Primary | ICD-10-CM

## 2018-08-20 DIAGNOSIS — Z13.220 SCREENING FOR LIPOID DISORDERS: ICD-10-CM

## 2018-08-20 LAB
ALBUMIN SERPL-MCNC: 4.4 G/DL (ref 3.5–5.2)
ALBUMIN/GLOB SERPL: 1.8 G/DL
ALP SERPL-CCNC: 70 U/L (ref 39–117)
ALT SERPL-CCNC: 18 U/L (ref 1–33)
AST SERPL-CCNC: 23 U/L (ref 1–32)
BILIRUB SERPL-MCNC: 0.3 MG/DL (ref 0.1–1.2)
BUN SERPL-MCNC: 14 MG/DL (ref 8–23)
BUN/CREAT SERPL: 14.4 (ref 7–25)
CALCIUM SERPL-MCNC: 9 MG/DL (ref 8.6–10.5)
CHLORIDE SERPL-SCNC: 102 MMOL/L (ref 98–107)
CHOLEST SERPL-MCNC: 197 MG/DL (ref 0–200)
CO2 SERPL-SCNC: 28.6 MMOL/L (ref 22–29)
CREAT SERPL-MCNC: 0.97 MG/DL (ref 0.57–1)
ERYTHROCYTE [DISTWIDTH] IN BLOOD BY AUTOMATED COUNT: 13.5 % (ref 11.7–13)
GLOBULIN SER CALC-MCNC: 2.5 GM/DL
GLUCOSE SERPL-MCNC: 71 MG/DL (ref 65–99)
HCT VFR BLD AUTO: 42.2 % (ref 35.6–45.5)
HDLC SERPL-MCNC: 65 MG/DL (ref 40–60)
HGB BLD-MCNC: 12.9 G/DL (ref 11.9–15.5)
LDLC SERPL CALC-MCNC: 119 MG/DL (ref 0–100)
LDLC/HDLC SERPL: 1.82 {RATIO}
MCH RBC QN AUTO: 30.6 PG (ref 26.9–32)
MCHC RBC AUTO-ENTMCNC: 30.6 G/DL (ref 32.4–36.3)
MCV RBC AUTO: 100.2 FL (ref 80.5–98.2)
PLATELET # BLD AUTO: 279 10*3/MM3 (ref 140–500)
POTASSIUM SERPL-SCNC: 4.4 MMOL/L (ref 3.5–5.2)
PROT SERPL-MCNC: 6.9 G/DL (ref 6–8.5)
RBC # BLD AUTO: 4.21 10*6/MM3 (ref 3.9–5.2)
SODIUM SERPL-SCNC: 143 MMOL/L (ref 136–145)
TRIGL SERPL-MCNC: 67 MG/DL (ref 0–150)
VLDLC SERPL CALC-MCNC: 13.4 MG/DL (ref 5–40)
WBC # BLD AUTO: 5.17 10*3/MM3 (ref 4.5–10.7)

## 2018-08-24 ENCOUNTER — OFFICE VISIT (OUTPATIENT)
Dept: FAMILY MEDICINE CLINIC | Facility: CLINIC | Age: 63
End: 2018-08-24

## 2018-08-24 VITALS
HEART RATE: 82 BPM | SYSTOLIC BLOOD PRESSURE: 118 MMHG | RESPIRATION RATE: 16 BRPM | BODY MASS INDEX: 23 KG/M2 | DIASTOLIC BLOOD PRESSURE: 72 MMHG | WEIGHT: 125 LBS | HEIGHT: 62 IN | OXYGEN SATURATION: 99 %

## 2018-08-24 DIAGNOSIS — Z23 NEED FOR VACCINATION: ICD-10-CM

## 2018-08-24 DIAGNOSIS — Z00.00 ROUTINE GENERAL MEDICAL EXAMINATION AT A HEALTH CARE FACILITY: Primary | ICD-10-CM

## 2018-08-24 PROCEDURE — 90471 IMMUNIZATION ADMIN: CPT | Performed by: FAMILY MEDICINE

## 2018-08-24 PROCEDURE — 99396 PREV VISIT EST AGE 40-64: CPT | Performed by: FAMILY MEDICINE

## 2018-08-24 PROCEDURE — 90632 HEPA VACCINE ADULT IM: CPT | Performed by: FAMILY MEDICINE

## 2018-08-24 RX ORDER — LEVOCETIRIZINE DIHYDROCHLORIDE 5 MG/1
5 TABLET, FILM COATED ORAL DAILY
COMMUNITY

## 2018-08-24 NOTE — PROGRESS NOTES
"Preventive Exam    History of Present Illness: June is here for check up and review of routine health maintenance. She states she is doing well and has no concerns except for occasional bout of anxiety. She feels she is handling this well right now and will call me if she feels she needs help.    REVIEW OF SYSTEMS  Constitutional: Negative.    HENT: Negative.    Eyes: Negative.    Respiratory: Negative.    Cardiovascular: Negative.    Gastrointestinal: Negative.    Endocrine: Negative.    Genitourinary: Negative.    Musculoskeletal: Negative.  Skin: Negative.    Allergic/Immunologic: Negative.    Neurological: Negative.    Hematological: Negative.    Psychiatric/Behavioral: Negative.    All other systems reviewed and are negative.          PHYSICAL EXAM    Vitals:    08/24/18 1450   BP: 118/72   Pulse: 82   Resp: 16   SpO2: 99%   Weight: 56.7 kg (125 lb)   Height: 156.2 cm (61.5\")     GENERAL: alert and oriented, afebrile and vital signs stable  HEENT: oral mucosa moist, PEERLA, EOM, conjunctiva normal  No cervical adenopathy  LUNGS: clear to ascultation bilaterally, no rales, ronchi or wheezing  HEART: RRR S1 S2 without murmers, thrills, rubs or gallops  CHEST WALL: within normal limits, no tenderness  ABDOMEN: WNL. Normal BS.  EXTREMITIES: No clubbing, cyanosis or edema noted. Normal Pulses.  SKIN: warm, dry, no rashes noted  NEURO: CN II- XII grossly intact    ASSESSMENT AND PLAN  Problem List Items Addressed This Visit     None      Visit Diagnoses     Routine general medical examination at a health care facility    -  Primary    Need for vaccination        Relevant Orders    Hepatitis A Vaccine Adult IM        Routine health maintenance reviewed and discussed with June.  Labs reviewed and on chart.  .  Orders Placed This Encounter   Procedures   • Hepatitis A Vaccine Adult IM     Return in about 1 year (around 8/24/2019) for Annual physical.  "

## 2018-08-24 NOTE — PATIENT INSTRUCTIONS
Annual Wellness  Personal Prevention Plan of Service     Date of Office Visit:  2018  Encounter Provider:  Mike Goldsmith MD  Place of Service:  [facility]  Patient Name: June Baca  :  1955    As part of the Annual Wellness portion of your visit today, we are providing you with this personalized preventive plan of services (PPPS). This plan is based upon recommendations of the [place] Preventive Services Task Force (USPSTF) and the Advisory Committee on Immunization Practices (ACIP).    This lists the preventive care services that should be considered, and provides dates of when you are due. Items listed as completed are up-to-date and do not require any further intervention.    Health Maintenance   Topic Date Due   • ZOSTER VACCINE (1 of 2) 2007   • INFLUENZA VACCINE  2018   • PAP SMEAR  2019   • COLONOSCOPY  2019   • ANNUAL PHYSICAL  2019   • MAMMOGRAM  2020   • TDAP/TD VACCINES (2 - Td) 2026   • HEPATITIS C SCREENING  Addressed       Orders Placed This Encounter   Procedures   • Hepatitis A Vaccine Adult IM       No Follow-up on file.

## 2018-10-01 RX ORDER — RALOXIFENE HYDROCHLORIDE 60 MG/1
TABLET, FILM COATED ORAL
Qty: 90 TABLET | Refills: 1 | Status: SHIPPED | OUTPATIENT
Start: 2018-10-01 | End: 2019-03-28 | Stop reason: SDUPTHER

## 2019-01-14 ENCOUNTER — OFFICE VISIT (OUTPATIENT)
Dept: FAMILY MEDICINE CLINIC | Facility: CLINIC | Age: 64
End: 2019-01-14

## 2019-01-14 VITALS
BODY MASS INDEX: 22.09 KG/M2 | HEART RATE: 86 BPM | HEIGHT: 61 IN | WEIGHT: 117 LBS | SYSTOLIC BLOOD PRESSURE: 96 MMHG | DIASTOLIC BLOOD PRESSURE: 64 MMHG | OXYGEN SATURATION: 96 %

## 2019-01-14 DIAGNOSIS — Z09 FOLLOW-UP EXAM: Primary | ICD-10-CM

## 2019-01-14 DIAGNOSIS — J18.9 PNEUMONIA DUE TO INFECTIOUS ORGANISM, UNSPECIFIED LATERALITY, UNSPECIFIED PART OF LUNG: ICD-10-CM

## 2019-01-14 PROCEDURE — 99213 OFFICE O/P EST LOW 20 MIN: CPT | Performed by: NURSE PRACTITIONER

## 2019-01-14 RX ORDER — ALBUTEROL SULFATE 90 UG/1
AEROSOL, METERED RESPIRATORY (INHALATION)
Refills: 0 | COMMUNITY
Start: 2019-01-05 | End: 2019-03-18

## 2019-01-14 RX ORDER — DOXYCYCLINE 100 MG/1
100 CAPSULE ORAL
COMMUNITY
Start: 2019-01-05 | End: 2019-01-15

## 2019-01-14 NOTE — PROGRESS NOTES
"June Baca is a 63 y.o. female.June presents for a follow up of pneumonia and an ear infection. She states that her energy level is slowly increasing. She is still unable to hear out of her left ear. She did return to work last week.    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  There are no Patient Instructions on file for this visit.    Chief Complaint   Patient presents with   • Pneumonia     Social History     Tobacco Use   • Smoking status: Never Smoker   • Smokeless tobacco: Never Used   Substance Use Topics   • Alcohol use: Yes     Types: 1 Glasses of wine, 1 - 2 Cans of beer per week     Comment: 5-7 GLASSES OF WINE PER WEEK, DRINKS BOURBON   • Drug use: No       History of Present Illness     The following portions of the patient's history were reviewed and updated as appropriate:PMHroutine: Social history , Allergies, Current Medications, Active Problem List and Health Maintenance    Review of Systems   Constitutional: Positive for activity change, appetite change and fatigue.   HENT: Positive for hearing loss. Negative for ear pain.    Respiratory: Positive for cough. Negative for shortness of breath.    Neurological: Negative for headaches.       Objective   Vitals:    01/14/19 0850   BP: 96/64   Pulse: 86   SpO2: 96%   Weight: 53.1 kg (117 lb)   Height: 155.6 cm (61.25\")     Body mass index is 21.93 kg/m².  Physical Exam   Constitutional: She appears well-developed and well-nourished. No distress.   HENT:   Head: Normocephalic and atraumatic.   Right Ear: External ear normal.   Left Ear: External ear normal.   Eyes: EOM are normal.   Neck: Neck supple. No thyromegaly present.   Cardiovascular: " Normal rate, regular rhythm and normal heart sounds.   Pulmonary/Chest: Effort normal and breath sounds normal.   Musculoskeletal: Normal range of motion.   Neurological: She is alert.   Skin: Skin is warm.   Nursing note and vitals reviewed.    Reviewed Data:  No visits with results within 1 Month(s) from this visit.   Latest known visit with results is:   Orders Only on 08/20/2018   Component Date Value Ref Range Status   • Total Cholesterol 08/20/2018 197  0 - 200 mg/dL Final   • Triglycerides 08/20/2018 67  0 - 150 mg/dL Final   • HDL Cholesterol 08/20/2018 65* 40 - 60 mg/dL Final   • VLDL Cholesterol 08/20/2018 13.4  5 - 40 mg/dL Final   • LDL Cholesterol  08/20/2018 119* 0 - 100 mg/dL Final   • LDL/HDL Ratio 08/20/2018 1.82   Final   • Glucose 08/20/2018 71  65 - 99 mg/dL Final   • BUN 08/20/2018 14  8 - 23 mg/dL Final   • Creatinine 08/20/2018 0.97  0.57 - 1.00 mg/dL Final   • eGFR Non  Am 08/20/2018 59* >60 mL/min/1.73 Final   • eGFR African Am 08/20/2018 71  >60 mL/min/1.73 Final   • BUN/Creatinine Ratio 08/20/2018 14.4  7.0 - 25.0 Final   • Sodium 08/20/2018 143  136 - 145 mmol/L Final   • Potassium 08/20/2018 4.4  3.5 - 5.2 mmol/L Final   • Chloride 08/20/2018 102  98 - 107 mmol/L Final   • Total CO2 08/20/2018 28.6  22.0 - 29.0 mmol/L Final   • Calcium 08/20/2018 9.0  8.6 - 10.5 mg/dL Final   • Total Protein 08/20/2018 6.9  6.0 - 8.5 g/dL Final   • Albumin 08/20/2018 4.40  3.50 - 5.20 g/dL Final   • Globulin 08/20/2018 2.5  gm/dL Final   • A/G Ratio 08/20/2018 1.8  g/dL Final   • Total Bilirubin 08/20/2018 0.3  0.1 - 1.2 mg/dL Final   • Alkaline Phosphatase 08/20/2018 70  39 - 117 U/L Final   • AST (SGOT) 08/20/2018 23  1 - 32 U/L Final   • ALT (SGPT) 08/20/2018 18  1 - 33 U/L Final   • WBC 08/20/2018 5.17  4.50 - 10.70 10*3/mm3 Final   • RBC 08/20/2018 4.21  3.90 - 5.20 10*6/mm3 Final   • Hemoglobin 08/20/2018 12.9  11.9 - 15.5 g/dL Final   • Hematocrit 08/20/2018 42.2  35.6 - 45.5 % Final   •  MCV 08/20/2018 100.2* 80.5 - 98.2 fL Final   • MCH 08/20/2018 30.6  26.9 - 32.0 pg Final   • MCHC 08/20/2018 30.6* 32.4 - 36.3 g/dL Final   • RDW 08/20/2018 13.5* 11.7 - 13.0 % Final   • Platelets 08/20/2018 279  140 - 500 10*3/mm3 Final

## 2019-03-18 ENCOUNTER — APPOINTMENT (OUTPATIENT)
Dept: LAB | Facility: HOSPITAL | Age: 64
End: 2019-03-18

## 2019-03-18 ENCOUNTER — OFFICE VISIT (OUTPATIENT)
Dept: ONCOLOGY | Facility: CLINIC | Age: 64
End: 2019-03-18

## 2019-03-18 VITALS
RESPIRATION RATE: 16 BRPM | HEIGHT: 61 IN | HEART RATE: 74 BPM | TEMPERATURE: 97.9 F | DIASTOLIC BLOOD PRESSURE: 60 MMHG | OXYGEN SATURATION: 97 % | BODY MASS INDEX: 22.88 KG/M2 | WEIGHT: 121.2 LBS | SYSTOLIC BLOOD PRESSURE: 118 MMHG

## 2019-03-18 DIAGNOSIS — M81.0 OSTEOPOROSIS, UNSPECIFIED OSTEOPOROSIS TYPE, UNSPECIFIED PATHOLOGICAL FRACTURE PRESENCE: ICD-10-CM

## 2019-03-18 DIAGNOSIS — C50.011 MALIGNANT NEOPLASM OF AREOLA OF RIGHT BREAST IN FEMALE, UNSPECIFIED ESTROGEN RECEPTOR STATUS (HCC): Primary | ICD-10-CM

## 2019-03-18 DIAGNOSIS — C50.919 MALIGNANT NEOPLASM OF FEMALE BREAST, UNSPECIFIED ESTROGEN RECEPTOR STATUS, UNSPECIFIED LATERALITY, UNSPECIFIED SITE OF BREAST (HCC): Primary | ICD-10-CM

## 2019-03-18 LAB
ALBUMIN SERPL-MCNC: 3.8 G/DL (ref 3.5–5.2)
ALBUMIN/GLOB SERPL: 1.2 G/DL (ref 1.1–2.4)
ALP SERPL-CCNC: 72 U/L (ref 38–116)
ALT SERPL W P-5'-P-CCNC: 16 U/L (ref 0–33)
ANION GAP SERPL CALCULATED.3IONS-SCNC: 12.7 MMOL/L
AST SERPL-CCNC: 24 U/L (ref 0–32)
BASOPHILS # BLD AUTO: 0.07 10*3/MM3 (ref 0–0.2)
BASOPHILS NFR BLD AUTO: 1.3 % (ref 0–1.5)
BILIRUB SERPL-MCNC: 0.2 MG/DL (ref 0.1–1.2)
BUN BLD-MCNC: 14 MG/DL (ref 6–20)
BUN/CREAT SERPL: 15.1 (ref 7.3–30)
CALCIUM SPEC-SCNC: 9.2 MG/DL (ref 8.5–10.2)
CHLORIDE SERPL-SCNC: 105 MMOL/L (ref 98–107)
CO2 SERPL-SCNC: 26.3 MMOL/L (ref 22–29)
CREAT BLD-MCNC: 0.93 MG/DL (ref 0.6–1.1)
DEPRECATED RDW RBC AUTO: 48.1 FL (ref 37–54)
EOSINOPHIL # BLD AUTO: 0.25 10*3/MM3 (ref 0–0.4)
EOSINOPHIL NFR BLD AUTO: 4.8 % (ref 0.3–6.2)
ERYTHROCYTE [DISTWIDTH] IN BLOOD BY AUTOMATED COUNT: 13.4 % (ref 12.3–15.4)
GFR SERPL CREATININE-BSD FRML MDRD: 61 ML/MIN/1.73
GLOBULIN UR ELPH-MCNC: 3.1 GM/DL (ref 1.8–3.5)
GLUCOSE BLD-MCNC: 68 MG/DL (ref 74–124)
HCT VFR BLD AUTO: 39.1 % (ref 34–46.6)
HGB BLD-MCNC: 12.6 G/DL (ref 12–15.9)
IMM GRANULOCYTES # BLD AUTO: 0.01 10*3/MM3 (ref 0–0.05)
IMM GRANULOCYTES NFR BLD AUTO: 0.2 % (ref 0–0.5)
LYMPHOCYTES # BLD AUTO: 1.69 10*3/MM3 (ref 0.7–3.1)
LYMPHOCYTES NFR BLD AUTO: 32.5 % (ref 19.6–45.3)
MCH RBC QN AUTO: 31.3 PG (ref 26.6–33)
MCHC RBC AUTO-ENTMCNC: 32.2 G/DL (ref 31.5–35.7)
MCV RBC AUTO: 97.3 FL (ref 79–97)
MONOCYTES # BLD AUTO: 0.56 10*3/MM3 (ref 0.1–0.9)
MONOCYTES NFR BLD AUTO: 10.8 % (ref 5–12)
NEUTROPHILS # BLD AUTO: 2.62 10*3/MM3 (ref 1.4–7)
NEUTROPHILS NFR BLD AUTO: 50.4 % (ref 42.7–76)
NRBC BLD AUTO-RTO: 0 /100 WBC (ref 0–0)
PLATELET # BLD AUTO: 293 10*3/MM3 (ref 140–450)
PMV BLD AUTO: 9.4 FL (ref 6–12)
POTASSIUM BLD-SCNC: 4.2 MMOL/L (ref 3.5–4.7)
PROT SERPL-MCNC: 6.9 G/DL (ref 6.3–8)
RBC # BLD AUTO: 4.02 10*6/MM3 (ref 3.77–5.28)
SODIUM BLD-SCNC: 144 MMOL/L (ref 134–145)
WBC NRBC COR # BLD: 5.2 10*3/MM3 (ref 3.4–10.8)

## 2019-03-18 PROCEDURE — 36415 COLL VENOUS BLD VENIPUNCTURE: CPT | Performed by: INTERNAL MEDICINE

## 2019-03-18 PROCEDURE — 80053 COMPREHEN METABOLIC PANEL: CPT | Performed by: INTERNAL MEDICINE

## 2019-03-18 PROCEDURE — 85025 COMPLETE CBC W/AUTO DIFF WBC: CPT | Performed by: INTERNAL MEDICINE

## 2019-03-18 PROCEDURE — 99214 OFFICE O/P EST MOD 30 MIN: CPT | Performed by: INTERNAL MEDICINE

## 2019-03-18 RX ORDER — ESTRADIOL 10 UG/1
INSERT VAGINAL WEEKLY
COMMUNITY
Start: 2019-02-12 | End: 2020-09-23

## 2019-03-18 NOTE — PROGRESS NOTES
Subjective .     REASONS FOR FOLLOWUP:   1. 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 Status post lumpectomy, sentinel lymph node biopsy 01/30 /2013.    2. Status post Mediport placement 02/27/2013 by Dr. Arita.    3. Baseline echocardiogram 03/07/2013 with normal ejection fraction of 61%.    4. Patient started on adjuvant chemotherapy with Adriamycin and Cytoxan on 3/11/2013 for 4 cycles, followed by weekly Taxol.    5. 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    6. Peripheral neuropathy related to weekly Taxol.    7. Mild neutropenia related to chemotherapy, required Neupogen for marrow support.    8. Moderate thrombocytopenia detected on 11/21/2014. The patient is here for further evaluation.   9. Patient switched from tamoxifen after 3 years to Evista as she could not tolerate tamoxifen and refused Arimidex.  HISTORY OF PRESENT ILLNESS:  The patient is a 63 y.o. year old female who is here for follow-up with the above-mentioned history.    History of Present Illness     The patient is a 60-year-old female with the above history, currently here for followup. She is on tamoxifen. She is tolerating tamoxifen well except that she has hot flashes for which she has to take Effexor. She has mild peripheral neuropathy for which she is taking vitamin B6 and B12 and seems to help. She is postmenopausal now. At the time that we started her on hormonal treatment we placed her on tamoxifen bein g perimenopausal but we discussed switching to Arimidex at her next appointment after we get the bone density. She has undergone a mammogram 2 weeks ago and has  been negative.      Pt had bone density which showed osteopenia,.  Given osteopenia patient is not too keen on going on Arimidex.  We had discussed giving Fosamax or prolia  for osteopenia but patient refused Arimidex.  Moreover her breast cancer was not highly ER positive.  So we could consider a chemoprophylaxis with Evista.  Patient is tolerating Evista now.    She complains of vaginal dryness.  She has been using Vagifem infrequently.  She understands that there could be some systemic absorption but she sees her vaginal dryness associated your that she needs to use it.      Patient has severe vaginal dryness and since it worsens her quality of life we discussed about consideration of less frequent Vagifem.    Interval history: Patient is still on Evista and tolerating it reasonably well.  She continues to have chronic neuropathy secondary to her previous chemotherapy with Taxol.  She also has vaginal dryness for which she has been on Vagifem.  She is going to discuss with her.  Gynecologist        PAST MEDICAL HISTORY: Significant for laparoscopic surgery at [facility] for [place] rine polyps, ovarian cyst and endometriosis. She had ingrown toenails x2 and has had surgery on the toenails by Dr. Robbin Garcia. She has had D and C in the past for irregular bleeding. Inguinal hernia surgery on the right in .      OB/GYN HISTORY: Menarche age 15. She is postmenopausal; last LMP 2012.  0.      Past Medical History:   Diagnosis Date   • Allergic     seasonal, lifelong   • Depression 1998    several months only   • Endometriosis    • Hot flashes due to tamoxifen    • Inguinal hernia    • Invasive ductal carcinoma of breast (CMS/HCC)     RIGHT WITH METAPLASTIC FEATURES (SPINDLE CELL METAPLASIA) 1.5CM GRADE 2, ER WEAKLY POSITIVE, MT NEGATIVE, HER-2/SOLITARIO NEGATIVE   • Low back pain 1991    usually mild, chiropractic maintenance   • Ovarian cyst    • Peripheral neuropathy     RELATED TO TAXOL   • Pneumonia  &    • Postmenopausal    • STD (sexually transmitted disease)     HPV genital warts   • Uterine polyp        ONCOLOGIC HISTORY:  (History from previous dates can  be found in the separate document.)  ONCOLOGIC HISTORY: The patient has had multiple cysts in both breasts requiring aspiration in the past and path has always been negative for malignancy back in March of 2006 and in June of 2010 subsequently she underwent aspiration even in June of 2011, w hich all were suggestive of fibroid adenoma. She has fibrocystic disease. More recently she underwent a mammogram on 06/28/12 - at that time she had a digital diagnostic mammogram. She had calcifications in both breasts which were thought to be benign and negative. She had a stable complicated cyst in the right breast at 12 o' clock position which was thought to be benign. Followup ultrasound of the right breast was recommended in six months. The complicated cyst in the right breast probably benign; however there was an inframammary lymph node in the right breast at 9 o 'clock position which was also thought to be benign. Hence a six month followup was suggested with ultrasound.     Subsequently on 01/04/13 she underwent right breast ultrasound, which showed that there was a stable round complicated cyst with thin margins measuring 3 mm from the right breast at 12 o' clock position. There were multiple stable oval elongated complicated cysts in the right breast with thin margins, which were thought to be maciel ign, but there was a new microlobulated taller-than-wide solid mass with partially defined margins, which was 1.4 cm in the right breast. Because of this new solid mass in the right breast, which was suspicious, ultrasound guided vacuum-assisted biopsy w as recommended. The biopsy was done on 01/08/13 - the right breast ten-thirty position core biopsy showed invasive mammary carcinoma, metaplastic-type, subtype adenocarcinoma with spindle cell metaplasia. It was a grade 2 tumor with a tubular differentiat ion score of 3, high nuclear grade score of 3 and mitotic figure score of 1. There was one mitotic figure per 10 HPF.  There was no DCIS identified. The Accession # on this was [id number]. Estrogen receptor was 10%, progesterone receptor 0%, HER2/corey w as 0% and Ki-67 as elevated at 56%. She underwent a chest x-ray on 01/16/13 which showed that there were no suspicious pulmonary opacities seen. There was slight scarring on both lung apices. MRI of the breast was done on 01/21/13 which showed 1.5 cm i r regular enhancing mass within the axillary tale region of the right breast representing the biopsy proven site of malignancy. No evidence of axillary adenopathy was appreciated. There were no findings suspicious for malignancy in the left breast. She d i d have some scattered foci of variable enhancement thought to be secondary to moderate fibrocystic breast changes. Subsequently patient was referred to Dr. Arita, who did a right breast ultrasound-guided lumpectomy with a right axillary sentinel lymph node dissection with lymphoscintigraphic guidance on 01/30/13. The pathology report (Accession # [id number]) showed sentinel lymph node #1 in the right axilla had 2 nodes, a smaller node which did not show any malignancy and the larger node also did not sh o w malignancy. The sentinel lymph node #2 in the right axilla did not show any malignancy. The right breast lumpectomy specimen did show invasive ductal carcinoma with metaplastic features. The margins of resection were negative. The right breast additi o nal superior margin did not show any tumor. The lateral margin, the inferior margin as well as the deep margin did not show any malignancy. There was skeletal muscle present at the deep margin, but did not have any malignancy. The tumor size was 1.5 cm . It was considered as invasive ductal carcinoma metaplastic-type adenocarcinoma with spindle cell metaplasia. It had a Roberto score of 7 out of 9 with mitotic score of 1, nuclear pleomorphism of 3 and tubular differentiation of 3. It was a grade 2 t umor. There was a single  focus of invasive carcinoma. Ductal carcinoma in situ was not present. There was some atypical ductal hyperplasia. Margins were uninvolved with invasive carcinoma. Distance from the closest margins was 4 mm. Deep margin on s p ecimen #3. Additional deep margin tissue on specimen #7 is negative as well, including skeletal muscle. The invasive carcinoma is 5 mm from the superior margin adjacent to the skin. It was a [id number] invasive carcinoma with metaplastic features with anna nocarcinoma with spindle cell features. ER is 10%, CT is 0%, HER2/corey is 0% and Ki-67 is 56%. Patient has been referred here for further options of treatment.      The patient was felt to have higher risk disease due to the metaplastic spindle cell nature of her tumor despite its size and negative nodes. It was recommended she proceed with adjuvant chemotherapy using dose dense Adriamycin/Cytoxan chemotherapy q. 2 weeks with Neulasta support x four cycles, followed by weekly Taxol x12 doses. 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 We will consider holding further Taxol if she develops any significant symptoms.      The patient underwent Mediport placement 02/27/13. [facility] echocardiogram 03/07/13 showed an ejection fraction of 61%. The patient was found to have thrombocytopenia with a platelet count initially of 136,000; on followup 03/08/13 it was 108,000. White count and hemoglobin were normal. IPF was significa n tly elevated at 24.7% suggestive of underlying diagnosis of ITP. We will check a B12, folate, ROLA panel. We will proceed on with treatment as planned. There was no splenomegaly on exam. We will follow her platelet count closely through treatment. If indeed she has ITP there may be some improvement with chemotherapy and steroids.    The patient was seen on 6/17/13 with worsening neutropenia related to Taxol. The decision was made to add  Neupogen x2 days with each weekly Taxol dose to hopefully continue on schedule without delay.    Patient has no worsening of peripheral neuropathy when she was evaluated on 7/8/13. We will continue to finish Taxol weekly for three more doses. Patient was instructed to increase her vitamin B12 to 1000 mcg and vitamin B6 at 50 mg.    Patient was on tamoxifen initially but had severe hot flashes.  Hormone status was checked and she was postmenopausal as a result we tried to switch her to Arimidex but because of osteopenia patient refused.  We offered Fosamax orally or prolia subcutaneous and she did not want that.    Given her ER was only 10% and CO negative we could consider continuing tamoxifen or offer Evista for chemotherapy prophylaxis and she chose to go on Evista.  She is tolerating Evista very well.    Mammography 07/10/2014 shows focal asymmetry in the right breast which is benign and stable calcifications in the left breast which is benign. One year followup recommended.    Mammogram done 07/13/2015 is negative.    Mammogram July 2016 is negative.  Mammogram screening August 27 2018- negative    Patient is on Evista.    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 She is a  at [employer]. She drinks 5-7 glasses of wine per week. She does not smoke. She drinks occasional bourbon but doesn’ t drink any whiskey. No risk factors for HIV. No tattoos, no previous blood transfusions.      FAMILY HISTORY: Father had history of prostate cancer at age 73, but he is 85 in good health. Mother is 82 and in fair health. She has a brother who is 48 years old and two sisters ages 53 and 51, all of them in good health. There is no family histor y of breast cancer. There is no history of cancer in extended family as well.      Current Outpatient Medications on File Prior to Visit   Medication Sig Dispense Refill   • Ascorbic Acid (VITAMIN C ER) 1000 MG tablet  controlled-release Take  by mouth.     • B Complex Vitamins (B COMPLEX PO) Take  by mouth.     • Cholecalciferol (VITAMIN D) 1000 UNITS tablet Take 1,000 Units by mouth.     • estradiol (VAGIFEM) 10 MCG tablet vaginal tablet 1 (One) Time Per Week.     • Fluticasone Propionate (FLONASE ALLERGY RELIEF NA) into each nostril.     • levocetirizine (XYZAL) 5 MG tablet Take 5 mg by mouth Every Evening.     • Multiple Vitamins-Minerals (MULTIVITAMIN ADULT PO) Take  by mouth.     • pseudoephedrine (SUDAFED) 30 MG tablet Take 30 mg by mouth as needed for congestion.     • raloxifene (EVISTA) 60 MG tablet TAKE 1 TABLET DAILY 90 tablet 1   • [DISCONTINUED] betamethasone dipropionate (DIPROLENE) 0.05 % cream Apply  topically 2 (two) times a day.     • [DISCONTINUED] VENTOLIN  (90 Base) MCG/ACT inhaler TAKE 2 PUFFS BY MOUTH EVERY 6 HOURS AS NEEDED FOR WHEEZE  0     No current facility-administered medications on file prior to visit.        ALLERGIES:     Allergies   Allergen Reactions   • Latex Rash       Social History     Socioeconomic History   • Marital status:      Spouse name: Drew   • Number of children: Not on file   • Years of education: College   • Highest education level: Not on file   Social Needs   • Financial resource strain: Not on file   • Food insecurity - worry: Not on file   • Food insecurity - inability: Not on file   • Transportation needs - medical: Not on file   • Transportation needs - non-medical: Not on file   Occupational History   • Occupation:      Employer: [employer]   Tobacco Use   • Smoking status: Never Smoker   • Smokeless tobacco: Never Used   Substance and Sexual Activity   • Alcohol use: Yes     Types: 1 Glasses of wine, 1 - 2 Cans of beer per week     Comment: 5-7 GLASSES OF WINE PER WEEK, DRINKS [place]   • Drug use: No   • Sexual activity: Yes     Partners: Male     Birth control/protection: Post-menopausal, None     Comment:  "little or no intercourse last 5 yrs., PT (dilators) 2017   Other Topics Concern   • Not on file   Social History Narrative    Lives at home with          Cancer-related family history includes Cancer in her father, maternal aunt, and maternal aunt; Prostate cancer in her father.     Review of Systems   Constitutional: Negative for appetite change, chills, diaphoresis, fatigue, fever and unexpected weight change.   HENT: Negative for hearing loss, sore throat and trouble swallowing.    Respiratory: Negative for cough, chest tightness, shortness of breath and wheezing.    Cardiovascular: Negative for chest pain, palpitations and leg swelling.   Gastrointestinal: Negative for abdominal distention, abdominal pain, constipation, diarrhea, nausea and vomiting.   Genitourinary: Negative for dysuria, frequency, hematuria and urgency.   Musculoskeletal: Negative for joint swelling.        No muscle weakness.   Skin: Negative for rash and wound.   Neurological: Negative for seizures, syncope, speech difficulty, weakness, numbness and headaches.   Hematological: Negative for adenopathy. Does not bruise/bleed easily.   Psychiatric/Behavioral: Negative for behavioral problems, confusion and suicidal ideas.     Patient does complain of vaginal dryness.    Objective      Vitals:    03/18/19 0954   BP: 118/60   Pulse: 74   Resp: 16   Temp: 97.9 °F (36.6 °C)   TempSrc: Oral   SpO2: 97%   Weight: 55 kg (121 lb 3.2 oz)   Height: 155.6 cm (61.26\")   PainSc: 0-No pain     Current Status 3/18/2019   ECOG score 0       Physical Exam    GENERAL:  Well-developed, well-nourished in no acute distress.   NECK:  Supple with good range of motion; no thyromegaly or masses, no JVD.  LYMPHATICS:  No cervical, supraclavicular, axillary or inguinal adenopathy.  CHEST:  Lungs clear to percussion and auscultation. Good airflow.  BREASTS: Breast right and left without any masses, no back bilateral axillary adenopathy, no bilateral supraclavicular " adenopathy.  There is no nipple discharge or nipple inversion in both breasts.  CARDIAC:  Regular rate and rhythm without murmurs, rubs or gallops. Normal S1,S2.  ABDOMEN:  Soft, nontender with no organomegaly or masses.  EXTREMITIES:  No clubbing, cyanosis or edema.  NEUROLOGICAL:  Cranial Nerves II-XII grossly intact.  No focal neurological deficits.        RECENT LABS:  Hematology WBC   Date Value Ref Range Status   03/18/2019 5.20 3.40 - 10.80 10*3/mm3 Final   08/20/2018 5.17 4.50 - 10.70 10*3/mm3 Final     RBC   Date Value Ref Range Status   03/18/2019 4.02 3.77 - 5.28 10*6/mm3 Final   08/20/2018 4.21 3.90 - 5.20 10*6/mm3 Final     Hemoglobin   Date Value Ref Range Status   03/18/2019 12.6 12.0 - 15.9 g/dL Final     Hematocrit   Date Value Ref Range Status   03/18/2019 39.1 34.0 - 46.6 % Final     Platelets   Date Value Ref Range Status   03/18/2019 293 140 - 450 10*3/mm3 Final      Mammo Scanned Image 08/27/18    Assessment/Plan   1. This is a patient with history of stage I breast cancer, currently on tamoxifen.  She is received 3 years of tamoxifen.  She does have osteopenia.  Given that her ER 0% MI 0%, HER 2 negative  intention is to give with chemoprophylaxis for a new breast primary we discussed about switching to Evista which helps with both osteopenia and also helps in chemoprevention.  She is concerned to switch to Arimidex given the fact that osteopenia  will get worse . Patient was offered Fosamax or prolia but patient refused.  Currently patient is on Evista and tolerating well.    Mammogram has been reviewed done on July 2017 and is negative.  Mammogram in August 2018 negative    · Patient is on Evista.     2. Hot flashes related to tamoxifen for which she is on Effexor.  Patient wants to stop Effexor XR as she does not like the side effects and gradually taper it over the 2 weeks.  Since hot flashes with very bothersome and patient's side effects to Effexor XR where significant she did not want  tamoxifen.  She refused Arimidex.  And currently is on Evista chemoprophylaxis.  She will complete 5 years as of January 2017.  Given high Ki-67, metaplastic features on her breast cancer we could consider longer-term Evista.     · Patient is tolerating Evista    3. Peripheral neuropathy. The patient is to continue taking B12 and B6 with improvement in neuropathy symptoms..    4.  Severe vaginal dryness for which she needs to use Vagifem intermittently.  She understands that there can be some systemic absorption which could be responsible for breast cancer.  However given significant vaginal dryness and she wants to continue using Vagifem intermittently.  · She will discuss with her gynecologist.  · Patient understands there may be slight systemic absorption of local Vagifem as well but if the symptoms are severe of vaginal dryness then she may need to consider intermittent Vagifem.    Because her quality of life is worse secondary to vaginal dryness I think it's reasonable to consider less frequent Vagifem.     Continue Evista.Mammogram scheduled for August 2019    Follow-up 6 months with labs    Jamia Bishop MD     Cc: Hakan Kline Allison R, MD

## 2019-03-19 ENCOUNTER — TELEPHONE (OUTPATIENT)
Dept: ONCOLOGY | Facility: CLINIC | Age: 64
End: 2019-03-19

## 2019-03-19 DIAGNOSIS — C50.011 MALIGNANT NEOPLASM OF AREOLA OF RIGHT BREAST IN FEMALE, UNSPECIFIED ESTROGEN RECEPTOR STATUS (HCC): Primary | ICD-10-CM

## 2019-03-19 DIAGNOSIS — Z12.39 SCREENING BREAST EXAMINATION: ICD-10-CM

## 2019-03-19 NOTE — TELEPHONE ENCOUNTER
----- Message from Kati Navarrete RN sent at 3/19/2019  8:37 AM EDT -----  Order entered for bilateral screening mammogram to be scheduled after July 27, 2019 per Dr. Bishop.    Please schedule, and call patient with appointment date and time.    Thank you,  Kati    ----- Message -----  From: Jamia Bishop MD  Sent: 3/18/2019   7:12 PM  To: Oncology Nurses    Please schedule bilateral screening mammogram after July 27, 2019 and call the appointment to the patient.  Jamia Bishop MD

## 2019-03-19 NOTE — PROGRESS NOTES
Order entered for bilateral screening mammogram to be scheduled after July 27, 2019 per in-basket message Dr. Bishop

## 2019-03-26 ENCOUNTER — APPOINTMENT (OUTPATIENT)
Dept: BONE DENSITY | Facility: HOSPITAL | Age: 64
End: 2019-03-26

## 2019-03-28 RX ORDER — RALOXIFENE HYDROCHLORIDE 60 MG/1
TABLET, FILM COATED ORAL
Qty: 90 TABLET | Refills: 1 | Status: SHIPPED | OUTPATIENT
Start: 2019-03-28 | End: 2019-10-01 | Stop reason: SDUPTHER

## 2019-03-29 ENCOUNTER — HOSPITAL ENCOUNTER (OUTPATIENT)
Dept: BONE DENSITY | Facility: HOSPITAL | Age: 64
Discharge: HOME | End: 2019-03-29
Admitting: INTERNAL MEDICINE

## 2019-03-29 DIAGNOSIS — M81.0 OSTEOPOROSIS, UNSPECIFIED OSTEOPOROSIS TYPE, UNSPECIFIED PATHOLOGICAL FRACTURE PRESENCE: ICD-10-CM

## 2019-03-29 PROCEDURE — 77080 DXA BONE DENSITY AXIAL: CPT

## 2019-04-16 ENCOUNTER — CLINICAL SUPPORT (OUTPATIENT)
Dept: FAMILY MEDICINE CLINIC | Facility: CLINIC | Age: 64
End: 2019-04-16

## 2019-04-16 DIAGNOSIS — Z23 NEED FOR VACCINATION: Primary | ICD-10-CM

## 2019-04-16 PROCEDURE — 90471 IMMUNIZATION ADMIN: CPT | Performed by: FAMILY MEDICINE

## 2019-04-16 PROCEDURE — 90632 HEPA VACCINE ADULT IM: CPT | Performed by: FAMILY MEDICINE

## 2019-07-18 ENCOUNTER — LAB (OUTPATIENT)
Dept: LAB | Facility: HOSPITAL | Age: 64
End: 2019-07-18

## 2019-07-18 ENCOUNTER — OFFICE VISIT (OUTPATIENT)
Dept: ONCOLOGY | Facility: CLINIC | Age: 64
End: 2019-07-18

## 2019-07-18 VITALS
TEMPERATURE: 98.3 F | HEART RATE: 85 BPM | BODY MASS INDEX: 23.17 KG/M2 | OXYGEN SATURATION: 99 % | RESPIRATION RATE: 16 BRPM | HEIGHT: 61 IN | WEIGHT: 122.7 LBS | DIASTOLIC BLOOD PRESSURE: 71 MMHG | SYSTOLIC BLOOD PRESSURE: 116 MMHG

## 2019-07-18 DIAGNOSIS — C50.011 MALIGNANT NEOPLASM OF AREOLA OF RIGHT BREAST IN FEMALE, UNSPECIFIED ESTROGEN RECEPTOR STATUS (HCC): Primary | ICD-10-CM

## 2019-07-18 LAB
ALBUMIN SERPL-MCNC: 4.2 G/DL (ref 3.5–5.2)
ALBUMIN/GLOB SERPL: 1.4 G/DL (ref 1.1–2.4)
ALP SERPL-CCNC: 66 U/L (ref 38–116)
ALT SERPL W P-5'-P-CCNC: 15 U/L (ref 0–33)
ANION GAP SERPL CALCULATED.3IONS-SCNC: 12 MMOL/L (ref 5–15)
AST SERPL-CCNC: 23 U/L (ref 0–32)
BASOPHILS # BLD AUTO: 0.05 10*3/MM3 (ref 0–0.2)
BASOPHILS NFR BLD AUTO: 0.9 % (ref 0–1.5)
BILIRUB SERPL-MCNC: 0.4 MG/DL (ref 0.2–1.2)
BUN BLD-MCNC: 15 MG/DL (ref 6–20)
BUN/CREAT SERPL: 14.9 (ref 7.3–30)
CALCIUM SPEC-SCNC: 9.5 MG/DL (ref 8.5–10.2)
CHLORIDE SERPL-SCNC: 104 MMOL/L (ref 98–107)
CO2 SERPL-SCNC: 28 MMOL/L (ref 22–29)
CREAT BLD-MCNC: 1.01 MG/DL (ref 0.6–1.1)
DEPRECATED RDW RBC AUTO: 48.1 FL (ref 37–54)
EOSINOPHIL # BLD AUTO: 0.09 10*3/MM3 (ref 0–0.4)
EOSINOPHIL NFR BLD AUTO: 1.7 % (ref 0.3–6.2)
ERYTHROCYTE [DISTWIDTH] IN BLOOD BY AUTOMATED COUNT: 13.2 % (ref 12.3–15.4)
GFR SERPL CREATININE-BSD FRML MDRD: 56 ML/MIN/1.73
GLOBULIN UR ELPH-MCNC: 3 GM/DL (ref 1.8–3.5)
GLUCOSE BLD-MCNC: 86 MG/DL (ref 74–124)
HCT VFR BLD AUTO: 37.6 % (ref 34–46.6)
HGB BLD-MCNC: 12.1 G/DL (ref 12–15.9)
IMM GRANULOCYTES # BLD AUTO: 0.01 10*3/MM3 (ref 0–0.05)
IMM GRANULOCYTES NFR BLD AUTO: 0.2 % (ref 0–0.5)
LYMPHOCYTES # BLD AUTO: 1.96 10*3/MM3 (ref 0.7–3.1)
LYMPHOCYTES NFR BLD AUTO: 36.4 % (ref 19.6–45.3)
MCH RBC QN AUTO: 32 PG (ref 26.6–33)
MCHC RBC AUTO-ENTMCNC: 32.2 G/DL (ref 31.5–35.7)
MCV RBC AUTO: 99.5 FL (ref 79–97)
MONOCYTES # BLD AUTO: 0.48 10*3/MM3 (ref 0.1–0.9)
MONOCYTES NFR BLD AUTO: 8.9 % (ref 5–12)
NEUTROPHILS # BLD AUTO: 2.79 10*3/MM3 (ref 1.7–7)
NEUTROPHILS NFR BLD AUTO: 51.9 % (ref 42.7–76)
NRBC BLD AUTO-RTO: 0 /100 WBC (ref 0–0.2)
PLATELET # BLD AUTO: 264 10*3/MM3 (ref 140–450)
PMV BLD AUTO: 9.3 FL (ref 6–12)
POTASSIUM BLD-SCNC: 4.4 MMOL/L (ref 3.5–4.7)
PROT SERPL-MCNC: 7.2 G/DL (ref 6.3–8)
RBC # BLD AUTO: 3.78 10*6/MM3 (ref 3.77–5.28)
SODIUM BLD-SCNC: 144 MMOL/L (ref 134–145)
WBC NRBC COR # BLD: 5.38 10*3/MM3 (ref 3.4–10.8)

## 2019-07-18 PROCEDURE — 80053 COMPREHEN METABOLIC PANEL: CPT | Performed by: INTERNAL MEDICINE

## 2019-07-18 PROCEDURE — 85025 COMPLETE CBC W/AUTO DIFF WBC: CPT | Performed by: INTERNAL MEDICINE

## 2019-07-18 PROCEDURE — 36415 COLL VENOUS BLD VENIPUNCTURE: CPT | Performed by: INTERNAL MEDICINE

## 2019-07-18 PROCEDURE — 99214 OFFICE O/P EST MOD 30 MIN: CPT | Performed by: INTERNAL MEDICINE

## 2019-07-18 NOTE — PROGRESS NOTES
Subjective .     REASONS FOR FOLLOWUP:   1. 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 Status post lumpectomy, sentinel lymph node biopsy 01/30 /2013.    2. Status post Mediport placement 02/27/2013 by Dr. Arita.    3. Baseline echocardiogram 03/07/2013 with normal ejection fraction of 61%.    4. Patient started on adjuvant chemotherapy with Adriamycin and Cytoxan on 3/11/2013 for 4 cycles, followed by weekly Taxol.    5. 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    6. Peripheral neuropathy related to weekly Taxol.   7. Mild neutropenia related to chemotherapy, required Neupogen for marrow support.    8. Moderate thrombocytopenia detected on 11/21/2014. The patient is here for further evaluation.   9. Patient switched from tamoxifen after 3 years to Evista as she could not tolerate tamoxifen and refused Arimidex.  10. 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       HISTORY OF PRESENT ILLNESS:  The patient is a 63 y.o. year old female who is here for follow-up with the above-mentioned history.    History of Present Illness     The patient is a 60-year-old female with the above history, currently here for 6 month followup.  Patient is tolerating Evista now and is tolerating it well. She has been experiencing some hot flashes which are tolerable. Patient denies weight loss, arthralgias, back pain and notes she has a good appetite.     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     She continues to complain of vaginal dryness.  She has been using Vagifem once weekly. Patient went through vaginal dilation therapy which did not seem to improve her symptoms but she state she will continue  therapy more consistently at home.     Patient has mammogram scheduled for the end of this month. She continues to take calcium and vitamin D. She performs self breast exams frequently.     Patient reports she does not exercise regularly. I advised her to try to exerccise 40 minutes daily 5 days a week.       PAST MEDICAL HISTORY: Significant for laparoscopic surgery at [facility] for [place] rine polyps, ovarian cyst and endometriosis. She had ingrown toenails x2 and has had surgery on the toenails by Dr. Robbin Garcia. She has had D and C in the past for irregular bleeding. Inguinal hernia surgery on the right in .      OB/GYN HISTORY: Menarche age 15. She is postmenopausal; last LMP 2012.  0.      Past Medical History:   Diagnosis Date   • Allergic     seasonal, lifelong   • Depression 1998    several months only   • Endometriosis    • Hot flashes due to tamoxifen    • Inguinal hernia    • Invasive ductal carcinoma of breast (CMS/HCC)     RIGHT WITH METAPLASTIC FEATURES (SPINDLE CELL METAPLASIA) 1.5CM GRADE 2, ER WEAKLY POSITIVE, ID NEGATIVE, HER-2/SOLITARIO NEGATIVE   • Low back pain 1991    usually mild, chiropractic maintenance   • Ovarian cyst    • Peripheral neuropathy     RELATED TO TAXOL   • Pneumonia  &    • Postmenopausal    • STD (sexually transmitted disease)     HPV genital warts   • Uterine polyp        ONCOLOGIC HISTORY:  (History from previous dates can be found in the separate document.)  ONCOLOGIC HISTORY: The patient has had multiple cysts in both breasts requiring aspiration in the past and path has always been negative for malignancy back in 2006 and in 2010 subsequently she underwent aspiration even in 2011, w hich all were suggestive of fibroid adenoma. She has fibrocystic disease. More recently she underwent a mammogram on 12 - at that time she had a digital diagnostic mammogram. She had calcifications in both breasts which were thought  to be benign and negative. She had a stable complicated cyst in the right breast at 12 o' clock position which was thought to be benign. Followup ultrasound of the right breast was recommended in six months. The complicated cyst in the right breast probably benign; however there was an inframammary lymph node in the right breast at 9 o 'clock position which was also thought to be benign. Hence a six month followup was suggested with ultrasound.     Subsequently on 01/04/13 she underwent right breast ultrasound, which showed that there was a stable round complicated cyst with thin margins measuring 3 mm from the right breast at 12 o' clock position. There were multiple stable oval elongated complicated cysts in the right breast with thin margins, which were thought to be maciel ign, but there was a new microlobulated taller-than-wide solid mass with partially defined margins, which was 1.4 cm in the right breast. Because of this new solid mass in the right breast, which was suspicious, ultrasound guided vacuum-assisted biopsy w as recommended. The biopsy was done on 01/08/13 - the right breast ten-thirty position core biopsy showed invasive mammary carcinoma, metaplastic-type, subtype adenocarcinoma with spindle cell metaplasia. It was a grade 2 tumor with a tubular differentiat ion score of 3, high nuclear grade score of 3 and mitotic figure score of 1. There was one mitotic figure per 10 HPF. There was no DCIS identified. The Accession # on this was [id number]. Estrogen receptor was 10%, progesterone receptor 0%, HER2/corey w as 0% and Ki-67 as elevated at 56%. She underwent a chest x-ray on 01/16/13 which showed that there were no suspicious pulmonary opacities seen. There was slight scarring on both lung apices. MRI of the breast was done on 01/21/13 which showed 1.5 cm i r regular enhancing mass within the axillary tale region of the right breast representing the biopsy proven site of malignancy. No evidence of  axillary adenopathy was appreciated. There were no findings suspicious for malignancy in the left breast. She d i d have some scattered foci of variable enhancement thought to be secondary to moderate fibrocystic breast changes. Subsequently patient was referred to Dr. Arita, who did a right breast ultrasound-guided lumpectomy with a right axillary sentinel lymph node dissection with lymphoscintigraphic guidance on 01/30/13. The pathology report (Accession # [id number]) showed sentinel lymph node #1 in the right axilla had 2 nodes, a smaller node which did not show any malignancy and the larger node also did not sh o w malignancy. The sentinel lymph node #2 in the right axilla did not show any malignancy. The right breast lumpectomy specimen did show invasive ductal carcinoma with metaplastic features. The margins of resection were negative. The right breast additi o nal superior margin did not show any tumor. The lateral margin, the inferior margin as well as the deep margin did not show any malignancy. There was skeletal muscle present at the deep margin, but did not have any malignancy. The tumor size was 1.5 cm . It was considered as invasive ductal carcinoma metaplastic-type adenocarcinoma with spindle cell metaplasia. It had a [place] score of 7 out of 9 with mitotic score of 1, nuclear pleomorphism of 3 and tubular differentiation of 3. It was a grade 2 t umor. There was a single focus of invasive carcinoma. Ductal carcinoma in situ was not present. There was some atypical ductal hyperplasia. Margins were uninvolved with invasive carcinoma. Distance from the closest margins was 4 mm. Deep margin on s p ecimen #3. Additional deep margin tissue on specimen #7 is negative as well, including skeletal muscle. The invasive carcinoma is 5 mm from the superior margin adjacent to the skin. It was a [id number] invasive carcinoma with metaplastic features with anna nocarcinoma with spindle cell features. ER is 10%, NM  is 0%, HER2/corey is 0% and Ki-67 is 56%. Patient has been referred here for further options of treatment.      The patient was felt to have higher risk disease due to the metaplastic spindle cell nature of her tumor despite its size and negative nodes. It was recommended she proceed with adjuvant chemotherapy using dose dense Adriamycin/Cytoxan chemotherapy q. 2 weeks with Neulasta support x four cycles, followed by weekly Taxol x12 doses. 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 We will consider holding further Taxol if she develops any significant symptoms.      The patient underwent Mediport placement 02/27/13. [facility] echocardiogram 03/07/13 showed an ejection fraction of 61%. The patient was found to have thrombocytopenia with a platelet count initially of 136,000; on followup 03/08/13 it was 108,000. White count and hemoglobin were normal. IPF was significa n tly elevated at 24.7% suggestive of underlying diagnosis of ITP. We will check a B12, folate, ROLA panel. We will proceed on with treatment as planned. There was no splenomegaly on exam. We will follow her platelet count closely through treatment. If indeed she has ITP there may be some improvement with chemotherapy and steroids.    The patient was seen on 6/17/13 with worsening neutropenia related to Taxol. The decision was made to add Neupogen x2 days with each weekly Taxol dose to hopefully continue on schedule without delay.    Patient has no worsening of peripheral neuropathy when she was evaluated on 7/8/13. We will continue to finish Taxol weekly for three more doses. Patient was instructed to increase her vitamin B12 to 1000 mcg and vitamin B6 at 50 mg.    Patient was on tamoxifen initially but had severe hot flashes.  Hormone status was checked and she was postmenopausal as a result we tried to switch her to Arimidex but because of osteopenia patient refused.   We offered Fosamax orally or prolia subcutaneous and she did not want that.    Given her ER was only 10% and CA negative we could consider continuing tamoxifen or offer Evista for chemotherapy prophylaxis and she chose to go on Evista.  She is tolerating Evista very well.    Mammography 07/10/2014 shows focal asymmetry in the right breast which is benign and stable calcifications in the left breast which is benign. One year followup recommended.    Mammogram done 07/13/2015 is negative.    Mammogram July 2016 is negative.  Mammogram screening August 27 2018- negative    Patient is on Evista.    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 She is a  at [employer]. She drinks 5-7 glasses of wine per week. She does not smoke. She drinks occasional bourbon but doesn’ t drink any whiskey. No risk factors for HIV. No tattoos, no previous blood transfusions.      FAMILY HISTORY: Father had history of prostate cancer at age 73, but he is 85 in good health. Mother is 82 and in fair health. She has a brother who is 48 years old and two sisters ages 53 and 51, all of them in good health. There is no family histor y of breast cancer. There is no history of cancer in extended family as well.      Current Outpatient Medications on File Prior to Visit   Medication Sig Dispense Refill   • Ascorbic Acid (VITAMIN C ER) 1000 MG tablet controlled-release Take  by mouth.     • B Complex Vitamins (B COMPLEX PO) Take  by mouth.     • Cholecalciferol (VITAMIN D) 1000 UNITS tablet Take 1,000 Units by mouth.     • estradiol (VAGIFEM) 10 MCG tablet vaginal tablet 1 (One) Time Per Week.     • Fluticasone Propionate (FLONASE ALLERGY RELIEF NA) into each nostril.     • levocetirizine (XYZAL) 5 MG tablet Take 5 mg by mouth Every Evening.     • Multiple Vitamins-Minerals (MULTIVITAMIN ADULT PO) Take  by mouth.     • pseudoephedrine (SUDAFED) 30 MG tablet Take 30 mg by mouth as needed for  congestion.     • raloxifene (EVISTA) 60 MG tablet TAKE 1 TABLET DAILY 90 tablet 1     No current facility-administered medications on file prior to visit.        ALLERGIES:     Allergies   Allergen Reactions   • Latex Rash       Social History     Socioeconomic History   • Marital status:      Spouse name: Drew   • Number of children: Not on file   • Years of education: College   • Highest education level: Not on file   Occupational History   • Occupation:      Employer: [employer]   Tobacco Use   • Smoking status: Never Smoker   • Smokeless tobacco: Never Used   Substance and Sexual Activity   • Alcohol use: Yes     Types: 1 Glasses of wine, 1 - 2 Cans of beer per week     Comment: 5-7 GLASSES OF WINE PER WEEK, DRINKS [place]   • Drug use: No   • Sexual activity: Yes     Partners: Male     Birth control/protection: Post-menopausal, None     Comment: little or no intercourse last 5 yrs., PT (dilators) 2017   Social History Narrative    Lives at home with          Cancer-related family history includes Cancer in her father, maternal aunt, and maternal aunt; Prostate cancer in her father.     Review of Systems   Constitutional: Negative for appetite change, chills, diaphoresis, fatigue, fever and unexpected weight change.   HENT: Negative for hearing loss, sore throat and trouble swallowing.    Respiratory: Negative for cough, chest tightness, shortness of breath and wheezing.    Cardiovascular: Negative for chest pain, palpitations and leg swelling.   Gastrointestinal: Negative for abdominal distention, abdominal pain, constipation, diarrhea, nausea and vomiting.   Genitourinary: Negative for dysuria, frequency, hematuria and urgency.   Musculoskeletal: Negative for joint swelling.        No muscle weakness.   Skin: Negative for rash and wound.   Neurological: Negative for seizures, syncope, speech difficulty, weakness, numbness and headaches.  "  Hematological: Negative for adenopathy. Does not bruise/bleed easily.   Psychiatric/Behavioral: Negative for behavioral problems, confusion and suicidal ideas.     Patient does complain of vaginal dryness.    Objective      Vitals:    07/18/19 1132   BP: 116/71   Pulse: 85   Resp: 16   Temp: 98.3 °F (36.8 °C)   TempSrc: Oral   SpO2: 99%   Weight: 55.7 kg (122 lb 11.2 oz)   Height: 155.6 cm (61.26\")   PainSc: 0-No pain     Current Status 7/18/2019   ECOG score 0       Physical Exam      GENERAL:  Well-developed, well-nourished in no acute distress.   SKIN:  Warm, dry without rashes, purpura or petechiae.  NECK:  Supple with good range of motion; no thyromegaly or masses, no JVD.  LYMPHATICS:  No cervical, supraclavicular, axillary or inguinal adenopathy.  CHEST:  Lungs clear to auscultation. Good airflow.  BREAST: Right breast: s/p lumpectomy, no skin changes, no evidence of breast mass, no nipple discharge, no evidence of any right axillary adenopathy or right supraclavicular adenopathy  Left breast: No evidence of any skin changes, no evidence of any left breast mass and no evidence of left nipple discharge as well as no left axillary adenopathy or left supraclavicular adenopathy.  CARDIAC:  Regular rate and rhythm without murmurs, rubs or gallops. Normal S1,S2.  ABDOMEN:  Soft, nontender with no hepatosplenomegaly or masses.  EXTREMITIES:  No clubbing, cyanosis or edema.  NEUROLOGICAL:  Cranial Nerves II-XII grossly intact.  No focal neurological deficits.  PSYCHIATRIC:  Normal affect and mood.            RECENT LABS:  Hematology WBC   Date Value Ref Range Status   07/18/2019 5.38 3.40 - 10.80 10*3/mm3 Final   08/20/2018 5.17 4.50 - 10.70 10*3/mm3 Final     RBC   Date Value Ref Range Status   07/18/2019 3.78 3.77 - 5.28 10*6/mm3 Final   08/20/2018 4.21 3.90 - 5.20 10*6/mm3 Final     Hemoglobin   Date Value Ref Range Status   07/18/2019 12.1 12.0 - 15.9 g/dL Final     Hematocrit   Date Value Ref Range Status "   07/18/2019 37.6 34.0 - 46.6 % Final     Platelets   Date Value Ref Range Status   07/18/2019 264 140 - 450 10*3/mm3 Final          Assessment/Plan   1. This is a patient with history of stage I breast cancer, currently on tamoxifen.  She is received 3 years of tamoxifen.  She does have osteopenia.  Given that her ER 0% AL 0%, HER 2 negative  intention is to give with chemoprophylaxis for a new breast primary we discussed about switching to Evista which helps with both osteopenia and also helps in chemoprevention.  She is concerned to switch to Arimidex given the fact that osteopenia  will get worse . Patient was offered Fosamax or prolia but patient refused.  Currently patient is on Evista and tolerating well.    Mammogram has been reviewed done on July 2017 and is negative.  Mammogram in August 2018 negative.  Patient has mammogram scheduled for the end of July.    · Patient is on Evista.     2. Hot flashes related to tamoxifen for which she is on Effexor.  Patient wants to stop Effexor XR as she does not like the side effects and gradually taper it over the 2 weeks.  Since hot flashes with very bothersome and patient's side effects to Effexor XR where significant she did not want tamoxifen.  She refused Arimidex.  And currently is on Evista chemoprophylaxis.  She will complete 5 years as of January 2017.  Given high Ki-67, metaplastic features on her breast cancer we could consider longer-term Evista.     · Patient is tolerating Evista    3. Peripheral neuropathy. The patient is to continue taking B12 and B6 with improvement in neuropathy symptoms.    4.  Severe vaginal dryness for which she needs to use Vagifem intermittently.  She understands that there can be some systemic absorption which could be responsible for breast cancer.  However given significant vaginal dryness and she wants to continue using Vagifem intermittently.  · She will discuss with her gynecologist.  · Patient understands there may be  slight systemic absorption of local Vagifem as well but if the symptoms are severe of vaginal dryness then she may need to consider intermittent Vagifem.  · Patient is currently taking Vagifem once weekly. She is currently undergoing dilation therapy which somewhat improves her symptoms.     5. Osteopenia: DEXA performed on 03/29/2019.     6.  Lifestyle modification: Patient reports she does not exercise regularly. I advised her to consider exercising 40 minutes daily 5 days a week.     7. 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    PLAN:   1. Continue Evista.   2. Proceed with screening mammogram scheduled for July 2019  3. Follow-up in 1 year with labs    I, Jacqueline Jasiel, acted as scribe for Jamia Bishop MD  in documenting the service or procedure. To the best of my knowledge, I recorded what was dictated by MD Jamia Camacho MD        Cc: Hakan Kline Allison R, MD

## 2019-07-29 ENCOUNTER — APPOINTMENT (OUTPATIENT)
Dept: WOMENS IMAGING | Facility: HOSPITAL | Age: 64
End: 2019-07-29

## 2019-07-29 PROCEDURE — 77063 BREAST TOMOSYNTHESIS BI: CPT | Performed by: RADIOLOGY

## 2019-07-29 PROCEDURE — 77067 SCR MAMMO BI INCL CAD: CPT | Performed by: RADIOLOGY

## 2019-08-09 DIAGNOSIS — N63.10 MASS OF RIGHT BREAST: ICD-10-CM

## 2019-08-09 DIAGNOSIS — C50.011 MALIGNANT NEOPLASM OF AREOLA OF RIGHT BREAST IN FEMALE, UNSPECIFIED ESTROGEN RECEPTOR STATUS (HCC): Primary | ICD-10-CM

## 2019-08-09 NOTE — PROGRESS NOTES
Orders entered for diagnostic mammogram right and ultrasound of right breast to be done next week at [facility] per in-basket message Dr. Bishop

## 2019-08-13 ENCOUNTER — APPOINTMENT (OUTPATIENT)
Dept: WOMENS IMAGING | Facility: HOSPITAL | Age: 64
End: 2019-08-13

## 2019-08-13 PROCEDURE — 77065 DX MAMMO INCL CAD UNI: CPT | Performed by: RADIOLOGY

## 2019-08-13 PROCEDURE — 76641 ULTRASOUND BREAST COMPLETE: CPT | Performed by: RADIOLOGY

## 2019-08-13 PROCEDURE — G0279 TOMOSYNTHESIS, MAMMO: HCPCS | Performed by: RADIOLOGY

## 2019-08-13 PROCEDURE — 77061 BREAST TOMOSYNTHESIS UNI: CPT | Performed by: RADIOLOGY

## 2019-08-21 ENCOUNTER — TELEPHONE (OUTPATIENT)
Dept: ONCOLOGY | Facility: HOSPITAL | Age: 64
End: 2019-08-21

## 2019-08-21 NOTE — TELEPHONE ENCOUNTER
----- Message from Reed Pond sent at 8/21/2019  1:56 PM EDT -----  Contact: [phone]  Had mammo & ultrasound 7-29-19 has mass breast.  Patient unsure if she needs to keep appointment. Instructed her to keep appointment.

## 2019-08-23 ENCOUNTER — LAB (OUTPATIENT)
Dept: LAB | Facility: HOSPITAL | Age: 64
End: 2019-08-23

## 2019-08-23 ENCOUNTER — OFFICE VISIT (OUTPATIENT)
Dept: ONCOLOGY | Facility: CLINIC | Age: 64
End: 2019-08-23

## 2019-08-23 VITALS
OXYGEN SATURATION: 97 % | RESPIRATION RATE: 16 BRPM | WEIGHT: 125 LBS | TEMPERATURE: 98.4 F | HEART RATE: 85 BPM | HEIGHT: 61 IN | DIASTOLIC BLOOD PRESSURE: 74 MMHG | SYSTOLIC BLOOD PRESSURE: 113 MMHG | BODY MASS INDEX: 23.6 KG/M2

## 2019-08-23 DIAGNOSIS — N63.10 BREAST MASS, RIGHT: Primary | ICD-10-CM

## 2019-08-23 DIAGNOSIS — C50.011 MALIGNANT NEOPLASM OF AREOLA OF RIGHT BREAST IN FEMALE, UNSPECIFIED ESTROGEN RECEPTOR STATUS (HCC): ICD-10-CM

## 2019-08-23 DIAGNOSIS — N63.10 MASS OF RIGHT BREAST: Primary | ICD-10-CM

## 2019-08-23 LAB
ALBUMIN SERPL-MCNC: 4 G/DL (ref 3.5–5.2)
ALBUMIN/GLOB SERPL: 1.3 G/DL (ref 1.1–2.4)
ALP SERPL-CCNC: 65 U/L (ref 38–116)
ALT SERPL W P-5'-P-CCNC: 13 U/L (ref 0–33)
ANION GAP SERPL CALCULATED.3IONS-SCNC: 10.1 MMOL/L (ref 5–15)
AST SERPL-CCNC: 23 U/L (ref 0–32)
BASOPHILS # BLD AUTO: 0.05 10*3/MM3 (ref 0–0.2)
BASOPHILS NFR BLD AUTO: 0.9 % (ref 0–1.5)
BILIRUB SERPL-MCNC: 0.3 MG/DL (ref 0.2–1.2)
BUN BLD-MCNC: 15 MG/DL (ref 6–20)
BUN/CREAT SERPL: 16.1 (ref 7.3–30)
CALCIUM SPEC-SCNC: 9.4 MG/DL (ref 8.5–10.2)
CHLORIDE SERPL-SCNC: 106 MMOL/L (ref 98–107)
CO2 SERPL-SCNC: 27.9 MMOL/L (ref 22–29)
CREAT BLD-MCNC: 0.93 MG/DL (ref 0.6–1.1)
DEPRECATED RDW RBC AUTO: 50.5 FL (ref 37–54)
EOSINOPHIL # BLD AUTO: 0.15 10*3/MM3 (ref 0–0.4)
EOSINOPHIL NFR BLD AUTO: 2.6 % (ref 0.3–6.2)
ERYTHROCYTE [DISTWIDTH] IN BLOOD BY AUTOMATED COUNT: 13.2 % (ref 12.3–15.4)
GFR SERPL CREATININE-BSD FRML MDRD: 61 ML/MIN/1.73
GLOBULIN UR ELPH-MCNC: 3 GM/DL (ref 1.8–3.5)
GLUCOSE BLD-MCNC: 103 MG/DL (ref 74–124)
HCT VFR BLD AUTO: 39.3 % (ref 34–46.6)
HGB BLD-MCNC: 12.4 G/DL (ref 12–15.9)
IMM GRANULOCYTES # BLD AUTO: 0.01 10*3/MM3 (ref 0–0.05)
IMM GRANULOCYTES NFR BLD AUTO: 0.2 % (ref 0–0.5)
LYMPHOCYTES # BLD AUTO: 1.95 10*3/MM3 (ref 0.7–3.1)
LYMPHOCYTES NFR BLD AUTO: 33.4 % (ref 19.6–45.3)
MCH RBC QN AUTO: 32.3 PG (ref 26.6–33)
MCHC RBC AUTO-ENTMCNC: 31.6 G/DL (ref 31.5–35.7)
MCV RBC AUTO: 102.3 FL (ref 79–97)
MONOCYTES # BLD AUTO: 0.49 10*3/MM3 (ref 0.1–0.9)
MONOCYTES NFR BLD AUTO: 8.4 % (ref 5–12)
NEUTROPHILS # BLD AUTO: 3.19 10*3/MM3 (ref 1.7–7)
NEUTROPHILS NFR BLD AUTO: 54.5 % (ref 42.7–76)
NRBC BLD AUTO-RTO: 0 /100 WBC (ref 0–0.2)
PLATELET # BLD AUTO: 263 10*3/MM3 (ref 140–450)
PMV BLD AUTO: 8.9 FL (ref 6–12)
POTASSIUM BLD-SCNC: 5 MMOL/L (ref 3.5–4.7)
PROT SERPL-MCNC: 7 G/DL (ref 6.3–8)
RBC # BLD AUTO: 3.84 10*6/MM3 (ref 3.77–5.28)
SODIUM BLD-SCNC: 144 MMOL/L (ref 134–145)
WBC NRBC COR # BLD: 5.84 10*3/MM3 (ref 3.4–10.8)

## 2019-08-23 PROCEDURE — 36415 COLL VENOUS BLD VENIPUNCTURE: CPT

## 2019-08-23 PROCEDURE — 85025 COMPLETE CBC W/AUTO DIFF WBC: CPT

## 2019-08-23 PROCEDURE — 99214 OFFICE O/P EST MOD 30 MIN: CPT | Performed by: INTERNAL MEDICINE

## 2019-08-23 PROCEDURE — 80053 COMPREHEN METABOLIC PANEL: CPT

## 2019-08-23 NOTE — PROGRESS NOTES
Subjective .     REASONS FOR FOLLOWUP:   1. 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 Status post lumpectomy, sentinel lymph node biopsy 01/30 /2013.    2. Status post Mediport placement 02/27/2013 by Dr. Arita.    3. Baseline echocardiogram 03/07/2013 with normal ejection fraction of 61%.    4. Patient started on adjuvant chemotherapy with Adriamycin and Cytoxan on 3/11/2013 for 4 cycles, followed by weekly Taxol.    5. 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    6. Peripheral neuropathy related to weekly Taxol.   7. Mild neutropenia related to chemotherapy, required Neupogen for marrow support.    8. Moderate thrombocytopenia detected on 11/21/2014. The patient is here for further evaluation.   9. Patient switched from tamoxifen after 3 years to Evista as she could not tolerate tamoxifen and refused Arimidex.  10. 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       HISTORY OF PRESENT ILLNESS:  The patient is a 63 y.o. year old female who is here for follow-up with the above-mentioned history.    History of Present Illness     The patient is a 60-year-old female with the above history, currently here for follow-up.    Patient had screening mammogram July 29, 2019.  There is a high density oval mass with dystrophic calcifications, skin thickening and postsurgical scar in the posterior one third upper outer quadrant of the right breast.  This density appears increased compared to the prior exam.  There is also stable oval mass with calcifications at 1:30 position in the left breast.  This is thought to be a biopsy-proven fibroadenoma.    August 15, 2019 right diagnostic mammogram showed dense breasts and the density shows oval mass 7 mm with dystrophic  calcification in the posterior one third 1030 region of the right breast.    Ultrasound shows a solid mass which is 11 x 7 x 13 mm which is thought to be suspicious.  Biopsy is recommended.    Patient states that on exam she does not feel the mass.    PAST MEDICAL HISTORY: Significant for laparoscopic surgery at [facility] for manuel rine polyps, ovarian cyst and endometriosis. She had ingrown toenails x2 and has had surgery on the toenails by Dr. Robbin Garcia. She has had D and C in the past for irregular bleeding. Inguinal hernia surgery on the right in .      OB/GYN HISTORY: Menarche age 15. She is postmenopausal; last LMP 2012.  0.      Past Medical History:   Diagnosis Date   • Allergic     seasonal, lifelong   • Depression 1998    several months only   • Endometriosis    • Hot flashes due to tamoxifen    • Inguinal hernia    • Invasive ductal carcinoma of breast (CMS/HCC)     RIGHT WITH METAPLASTIC FEATURES (SPINDLE CELL METAPLASIA) 1.5CM GRADE 2, ER WEAKLY POSITIVE, NE NEGATIVE, HER-2/SOLITARIO NEGATIVE   • Low back pain 1991    usually mild, chiropractic maintenance   • Ovarian cyst    • Peripheral neuropathy     RELATED TO TAXOL   • Pneumonia  &    • Postmenopausal    • STD (sexually transmitted disease)     HPV genital warts   • Uterine polyp        ONCOLOGIC HISTORY:  (History from previous dates can be found in the separate document.)  ONCOLOGIC HISTORY: The patient has had multiple cysts in both breasts requiring aspiration in the past and path has always been negative for malignancy back in 2006 and in 2010 subsequently she underwent aspiration even in 2011, w hich all were suggestive of fibroid adenoma. She has fibrocystic disease. More recently she underwent a mammogram on 12 - at that time she had a digital diagnostic mammogram. She had calcifications in both breasts which were thought to be benign and negative. She had a stable complicated  cyst in the right breast at 12 o' clock position which was thought to be benign. Followup ultrasound of the right breast was recommended in six months. The complicated cyst in the right breast probably benign; however there was an inframammary lymph node in the right breast at 9 o 'clock position which was also thought to be benign. Hence a six month followup was suggested with ultrasound.     Subsequently on 01/04/13 she underwent right breast ultrasound, which showed that there was a stable round complicated cyst with thin margins measuring 3 mm from the right breast at 12 o' clock position. There were multiple stable oval elongated complicated cysts in the right breast with thin margins, which were thought to be maciel ign, but there was a new microlobulated taller-than-wide solid mass with partially defined margins, which was 1.4 cm in the right breast. Because of this new solid mass in the right breast, which was suspicious, ultrasound guided vacuum-assisted biopsy w as recommended. The biopsy was done on 01/08/13 - the right breast ten-thirty position core biopsy showed invasive mammary carcinoma, metaplastic-type, subtype adenocarcinoma with spindle cell metaplasia. It was a grade 2 tumor with a tubular differentiat ion score of 3, high nuclear grade score of 3 and mitotic figure score of 1. There was one mitotic figure per 10 HPF. There was no DCIS identified. The Accession # on this was [id number]. Estrogen receptor was 10%, progesterone receptor 0%, HER2/corey w as 0% and Ki-67 as elevated at 56%. She underwent a chest x-ray on 01/16/13 which showed that there were no suspicious pulmonary opacities seen. There was slight scarring on both lung apices. MRI of the breast was done on 01/21/13 which showed 1.5 cm i r regular enhancing mass within the axillary tale region of the right breast representing the biopsy proven site of malignancy. No evidence of axillary adenopathy was appreciated. There were no findings  suspicious for malignancy in the left breast. She d i d have some scattered foci of variable enhancement thought to be secondary to moderate fibrocystic breast changes. Subsequently patient was referred to Dr. Arita, who did a right breast ultrasound-guided lumpectomy with a right axillary sentinel lymph node dissection with lymphoscintigraphic guidance on 01/30/13. The pathology report (Accession # [id number]) showed sentinel lymph node #1 in the right axilla had 2 nodes, a smaller node which did not show any malignancy and the larger node also did not sh o w malignancy. The sentinel lymph node #2 in the right axilla did not show any malignancy. The right breast lumpectomy specimen did show invasive ductal carcinoma with metaplastic features. The margins of resection were negative. The right breast additi o nal superior margin did not show any tumor. The lateral margin, the inferior margin as well as the deep margin did not show any malignancy. There was skeletal muscle present at the deep margin, but did not have any malignancy. The tumor size was 1.5 cm . It was considered as invasive ductal carcinoma metaplastic-type adenocarcinoma with spindle cell metaplasia. It had a [place] score of 7 out of 9 with mitotic score of 1, nuclear pleomorphism of 3 and tubular differentiation of 3. It was a grade 2 t umor. There was a single focus of invasive carcinoma. Ductal carcinoma in situ was not present. There was some atypical ductal hyperplasia. Margins were uninvolved with invasive carcinoma. Distance from the closest margins was 4 mm. Deep margin on s p ecimen #3. Additional deep margin tissue on specimen #7 is negative as well, including skeletal muscle. The invasive carcinoma is 5 mm from the superior margin adjacent to the skin. It was a [id number] invasive carcinoma with metaplastic features with anna nocarcinoma with spindle cell features. ER is 10%, NH is 0%, HER2/corey is 0% and Ki-67 is 56%. Patient has been  referred here for further options of treatment.      The patient was felt to have higher risk disease due to the metaplastic spindle cell nature of her tumor despite its size and negative nodes. It was recommended she proceed with adjuvant chemotherapy using dose dense Adriamycin/Cytoxan chemotherapy q. 2 weeks with Neulasta support x four cycles, followed by weekly Taxol x12 doses. 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 We will consider holding further Taxol if she develops any significant symptoms.      The patient underwent Mediport placement 02/27/13. [facility] echocardiogram 03/07/13 showed an ejection fraction of 61%. The patient was found to have thrombocytopenia with a platelet count initially of 136,000; on followup 03/08/13 it was 108,000. White count and hemoglobin were normal. IPF was significa n tly elevated at 24.7% suggestive of underlying diagnosis of ITP. We will check a B12, folate, ROLA panel. We will proceed on with treatment as planned. There was no splenomegaly on exam. We will follow her platelet count closely through treatment. If indeed she has ITP there may be some improvement with chemotherapy and steroids.    The patient was seen on 6/17/13 with worsening neutropenia related to Taxol. The decision was made to add Neupogen x2 days with each weekly Taxol dose to hopefully continue on schedule without delay.    Patient has no worsening of peripheral neuropathy when she was evaluated on 7/8/13. We will continue to finish Taxol weekly for three more doses. Patient was instructed to increase her vitamin B12 to 1000 mcg and vitamin B6 at 50 mg.    Patient was on tamoxifen initially but had severe hot flashes.  Hormone status was checked and she was postmenopausal as a result we tried to switch her to Arimidex but because of osteopenia patient refused.  We offered Fosamax orally or prolia subcutaneous and  she did not want that.    Given her ER was only 10% and NC negative we could consider continuing tamoxifen or offer Evista for chemotherapy prophylaxis and she chose to go on Evista.  She is tolerating Evista very well.    Mammography 07/10/2014 shows focal asymmetry in the right breast which is benign and stable calcifications in the left breast which is benign. One year followup recommended.    Mammogram done 07/13/2015 is negative.    Mammogram July 2016 is negative.  Mammogram screening August 27 2018- negative    Patient is on Evista.    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 She is a  at [employer]. She drinks 5-7 glasses of wine per week. She does not smoke. She drinks occasional bourbon but doesn’ t drink any whiskey. No risk factors for HIV. No tattoos, no previous blood transfusions.      FAMILY HISTORY: Father had history of prostate cancer at age 73, but he is 85 in good health. Mother is 82 and in fair health. She has a brother who is 48 years old and two sisters ages 53 and 51, all of them in good health. There is no family histor y of breast cancer. There is no history of cancer in extended family as well.      Current Outpatient Medications on File Prior to Visit   Medication Sig Dispense Refill   • Ascorbic Acid (VITAMIN C ER) 1000 MG tablet controlled-release Take  by mouth.     • B Complex Vitamins (B COMPLEX PO) Take  by mouth.     • Cholecalciferol (VITAMIN D) 1000 UNITS tablet Take 1,000 Units by mouth.     • estradiol (VAGIFEM) 10 MCG tablet vaginal tablet 1 (One) Time Per Week.     • Fluticasone Propionate (FLONASE ALLERGY RELIEF NA) into each nostril.     • levocetirizine (XYZAL) 5 MG tablet Take 5 mg by mouth Every Evening.     • Multiple Vitamins-Minerals (MULTIVITAMIN ADULT PO) Take  by mouth.     • pseudoephedrine (SUDAFED) 30 MG tablet Take 30 mg by mouth as needed for congestion.     • raloxifene (EVISTA) 60 MG tablet  TAKE 1 TABLET DAILY 90 tablet 1     No current facility-administered medications on file prior to visit.        ALLERGIES:     Allergies   Allergen Reactions   • Latex Rash       Social History     Socioeconomic History   • Marital status:      Spouse name: Drew   • Number of children: Not on file   • Years of education: College   • Highest education level: Not on file   Occupational History   • Occupation:      Employer: [employer]   Tobacco Use   • Smoking status: Never Smoker   • Smokeless tobacco: Never Used   Substance and Sexual Activity   • Alcohol use: Yes     Types: 1 Glasses of wine, 1 - 2 Cans of beer per week     Comment: 5-7 GLASSES OF WINE PER WEEK, DRINKS [place]   • Drug use: No   • Sexual activity: Yes     Partners: Male     Birth control/protection: Post-menopausal, None     Comment: little or no intercourse last 5 yrs., PT (dilators) 2017   Social History Narrative    Lives at home with          Cancer-related family history includes Cancer in her father, maternal aunt, and maternal aunt; Prostate cancer in her father.     Review of Systems   Constitutional: Negative for appetite change, chills, diaphoresis, fatigue, fever and unexpected weight change.   HENT: Negative for hearing loss, sore throat and trouble swallowing.    Respiratory: Negative for cough, chest tightness, shortness of breath and wheezing.    Cardiovascular: Negative for chest pain, palpitations and leg swelling.   Gastrointestinal: Negative for abdominal distention, abdominal pain, constipation, diarrhea, nausea and vomiting.   Genitourinary: Negative for dysuria, frequency, hematuria and urgency.   Musculoskeletal: Negative for joint swelling.        No muscle weakness.   Skin: Negative for rash and wound.   Neurological: Negative for seizures, syncope, speech difficulty, weakness, numbness and headaches.   Hematological: Negative for adenopathy. Does not  "bruise/bleed easily.   Psychiatric/Behavioral: Negative for behavioral problems, confusion and suicidal ideas.     Patient does complain of vaginal dryness.    Objective      Vitals:    08/23/19 0944   BP: 113/74   Pulse: 85   Resp: 16   Temp: 98.4 °F (36.9 °C)   TempSrc: Oral   SpO2: 97%   Weight: 56.7 kg (125 lb)   Height: 155.6 cm (61.26\")   PainSc: 0-No pain     Current Status 8/23/2019   ECOG score 0       Physical Exam      GENERAL:  Well-developed, well-nourished in no acute distress.   SKIN:  Warm, dry without rashes, purpura or petechiae.  NECK:  Supple with good range of motion; no thyromegaly or masses, no JVD.  LYMPHATICS:  No cervical, supraclavicular, axillary or inguinal adenopathy.  CHEST:  Lungs clear to auscultation. Good airflow.  BREAST: Right breast: s/p lumpectomy, no skin changes, no evidence of breast mass, no nipple discharge, no evidence of any right axillary adenopathy or right supraclavicular adenopathy  Left breast: No evidence of any skin changes, no evidence of any left breast mass and no evidence of left nipple discharge as well as no left axillary adenopathy or left supraclavicular adenopathy.  CARDIAC:  Regular rate and rhythm without murmurs, rubs or gallops. Normal S1,S2.  ABDOMEN:  Soft, nontender with no hepatosplenomegaly or masses.  EXTREMITIES:  No clubbing, cyanosis or edema.  NEUROLOGICAL:  Cranial Nerves II-XII grossly intact.  No focal neurological deficits.  PSYCHIATRIC:  Normal affect and mood.            RECENT LABS:  Hematology WBC   Date Value Ref Range Status   08/23/2019 5.84 3.40 - 10.80 10*3/mm3 Final   08/20/2018 5.17 4.50 - 10.70 10*3/mm3 Final     RBC   Date Value Ref Range Status   08/23/2019 3.84 3.77 - 5.28 10*6/mm3 Final   08/20/2018 4.21 3.90 - 5.20 10*6/mm3 Final     Hemoglobin   Date Value Ref Range Status   08/23/2019 12.4 12.0 - 15.9 g/dL Final     Hematocrit   Date Value Ref Range Status   08/23/2019 39.3 34.0 - 46.6 % Final     Platelets   Date " Value Ref Range Status   08/23/2019 263 140 - 450 10*3/mm3 Final      Mammo 08/21/19    IMPRESSION:  Solid mass in the right breast is suspicious. Differential considerations include fat necrosis. However US guided biopsy is recommended to exclude recurrent malignancy. Ultrasound guided vacuum assisted biopsy is recommended. Ekaterina at Dr. Kline's office was verbally notified 08/21/19 at 10:51 am regarding the above recommendations. This report will be faxed today 08/21/19. The notification letter was mailed to the patient. BI-RADS Category 4B: Suspicious Abnormality.    Assessment/Plan   · 1. This is a patient with history of stage I breast cancer, currently on tamoxifen.  She is received 3 years of tamoxifen.  She does have osteopenia.  Given that her ER 0% [place] 0%, HER 2 negative  intention is to give with chemoprophylaxis for a new breast primary we discussed about switching to Evista which helps with both osteopenia and also helps in chemoprevention.      · Patient is on Evista.    2.  Density in the right breast on screening mammogram July 2019 which appears larger.  This is in the area of the postsurgical scar.  · Right diagnostic mammogram and ultrasound shows 11 x 7 x 13 mm density which is suspicious.    · Patient will need ultrasound-guided biopsy of the right breast mass    3. Peripheral neuropathy. The patient is to continue taking B12 and B6 with improvement in neuropathy symptoms.    4.  Severe vaginal dryness for which she needs to use Vagifem intermittently.  She understands that there can be some systemic absorption which could be responsible for breast cancer.  However given significant vaginal dryness and she wants to continue using Vagifem intermittently.  · She will discuss with her gynecologist.  · Patient understands there may be slight systemic absorption of local Vagifem as well but if the symptoms are severe of vaginal dryness then she may need to consider intermittent  Vagifem.  · Patient is currently taking Vagifem once weekly. She is currently undergoing dilation therapy which somewhat improves her symptoms.     5. Osteopenia: DEXA performed on 03/29/2019.     6.  Lifestyle modification: Patient reports she does not exercise regularly. I advised her to consider exercising 40 minutes daily 5 days a week.     7. 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    PLAN:   1. Continue Evista.   2. We will schedule ultrasound-guided biopsy of the right breast mass  3. We will schedule follow-up with Dr. Arita after biopsy diagnosis is obtained in 3 weeks  4. Follow-up with me in 4 weeks with labs    Jamia Bishop MD        Cc:   Dr. Faiza padilla

## 2019-08-27 ENCOUNTER — TELEPHONE (OUTPATIENT)
Dept: SURGERY | Facility: CLINIC | Age: 64
End: 2019-08-27

## 2019-08-27 NOTE — TELEPHONE ENCOUNTER
JULIÁN for patient to call-  Patient referred back to .    Scheduled appt for 9-3-19 arrive 9:00    Mailed pw.  Pt is scheduled for a U/S guided breast biopsy @ [facility] 8-28-19.  ( scheduled)    parull

## 2019-08-28 ENCOUNTER — HOSPITAL ENCOUNTER (OUTPATIENT)
Dept: ULTRASOUND IMAGING | Facility: HOSPITAL | Age: 64
Discharge: HOME | End: 2019-08-28
Admitting: INTERNAL MEDICINE

## 2019-08-28 VITALS
SYSTOLIC BLOOD PRESSURE: 116 MMHG | HEIGHT: 61 IN | HEART RATE: 84 BPM | TEMPERATURE: 97 F | WEIGHT: 125 LBS | RESPIRATION RATE: 16 BRPM | BODY MASS INDEX: 23.6 KG/M2 | DIASTOLIC BLOOD PRESSURE: 74 MMHG | OXYGEN SATURATION: 99 %

## 2019-08-28 DIAGNOSIS — N63.10 BREAST MASS, RIGHT: ICD-10-CM

## 2019-08-28 PROCEDURE — 25010000003 LIDOCAINE 1 % SOLUTION: Performed by: RADIOLOGY

## 2019-08-28 PROCEDURE — 88305 TISSUE EXAM BY PATHOLOGIST: CPT | Performed by: INTERNAL MEDICINE

## 2019-08-28 PROCEDURE — 88342 IMHCHEM/IMCYTCHM 1ST ANTB: CPT | Performed by: INTERNAL MEDICINE

## 2019-08-28 PROCEDURE — A4648 IMPLANTABLE TISSUE MARKER: HCPCS

## 2019-08-28 RX ORDER — LIDOCAINE HYDROCHLORIDE AND EPINEPHRINE 10; 10 MG/ML; UG/ML
10 INJECTION, SOLUTION INFILTRATION; PERINEURAL ONCE
Status: COMPLETED | OUTPATIENT
Start: 2019-08-28 | End: 2019-08-28

## 2019-08-28 RX ORDER — DIAZEPAM 5 MG/1
5 TABLET ORAL ONCE AS NEEDED
Status: DISCONTINUED | OUTPATIENT
Start: 2019-08-28 | End: 2019-08-29 | Stop reason: HOSPADM

## 2019-08-28 RX ORDER — LIDOCAINE HYDROCHLORIDE 10 MG/ML
10 INJECTION, SOLUTION INFILTRATION; PERINEURAL ONCE
Status: COMPLETED | OUTPATIENT
Start: 2019-08-28 | End: 2019-08-28

## 2019-08-28 RX ADMIN — LIDOCAINE HYDROCHLORIDE 10 ML: 10 INJECTION, SOLUTION INFILTRATION; PERINEURAL at 10:48

## 2019-08-28 RX ADMIN — LIDOCAINE HYDROCHLORIDE AND EPINEPHRINE 10 ML: 10; 10 INJECTION, SOLUTION INFILTRATION; PERINEURAL at 10:48

## 2019-08-29 LAB
CYTO UR: NORMAL
LAB AP CASE REPORT: NORMAL
LAB AP CLINICAL INFORMATION: NORMAL
LAB AP DIAGNOSIS COMMENT: NORMAL
PATH REPORT.FINAL DX SPEC: NORMAL
PATH REPORT.GROSS SPEC: NORMAL

## 2019-09-03 ENCOUNTER — OFFICE VISIT (OUTPATIENT)
Dept: SURGERY | Facility: CLINIC | Age: 64
End: 2019-09-03

## 2019-09-03 VITALS
HEART RATE: 79 BPM | DIASTOLIC BLOOD PRESSURE: 68 MMHG | SYSTOLIC BLOOD PRESSURE: 108 MMHG | BODY MASS INDEX: 22.82 KG/M2 | WEIGHT: 124 LBS | HEIGHT: 62 IN | OXYGEN SATURATION: 98 %

## 2019-09-03 DIAGNOSIS — C50.411 MALIGNANT NEOPLASM OF UPPER-OUTER QUADRANT OF RIGHT BREAST IN FEMALE, ESTROGEN RECEPTOR POSITIVE (HCC): Primary | ICD-10-CM

## 2019-09-03 DIAGNOSIS — R92.8 ABNORMAL ULTRASOUND OF BREAST: ICD-10-CM

## 2019-09-03 DIAGNOSIS — N64.1 FAT NECROSIS OF BREAST: ICD-10-CM

## 2019-09-03 DIAGNOSIS — Z17.0 MALIGNANT NEOPLASM OF UPPER-OUTER QUADRANT OF RIGHT BREAST IN FEMALE, ESTROGEN RECEPTOR POSITIVE (HCC): Primary | ICD-10-CM

## 2019-09-03 DIAGNOSIS — R92.8 ABNORMAL MAMMOGRAM: ICD-10-CM

## 2019-09-03 PROCEDURE — 99242 OFF/OP CONSLTJ NEW/EST SF 20: CPT | Performed by: SURGERY

## 2019-09-03 NOTE — PROGRESS NOTES
Chief Complaint: June Baca is a 63 y.o. female who was seen in consultation at the request of  Jamia Bishop MD  for history of breast cancer and abnormal breast imaging    History of Present Illness:  Previous history in our office:  Patient presents with a newly diagnosed RIGHT breast cancer. She noted no masses, skin changes, nipple discharge, nipple changes prior to her most recent imaging.   She has a quite complex history of dense breast tissue on mammograms in addition to fibrocystic condition. She has had 4 breast biopsies including her most recent malignant diagnosis, 2 in each breast, in addition to cyst aspirations. See comlete imaging since 2010 below.    5/27/10    Yearly screening mammogram      [facility]      June Baca  The breasts are extremely dense.   Finding 1: There is a focal asymmetry with architectural distortion posterior one-third central region of the left breast.   Finding 2: Calcifications posterior one-third upper outer region of the left breast.   Finding 3: Calcifications grouped anterior one-third lower inner region of the left breast.   In the right breast no abnormalities are seen.   IMPRESSION:   Finding 1: Focal asymmetry in the left breast requires additional evaluation.   Finding 2: Calcifications in the posterior one-third upper outer region of the left breast require additional evaluation.   Finding 3: Calcifications in the anterior one-third lower inner region of the left breast requires additional evaluation.   BIRADS Category: 0      06/15/10     Left Diag Mammo     [facility]       June Baca   Finding 1: Focal asymmetric density has become less defined.   Finding 2: Calcifications have become more defined.   Finding 3: Microcalcifications are indeterminate  LEFT BREAST ULTRASOUND:  Finding 1: Ultrasound is suggestive of a solid mass measuring 17 mm.   Finding 4: There is an oval complex cyst measuring 9 mm seen in the left breast at 9 o'clock.   Finding  5: Multiple oval simple cysts seen in the left breast.   IMPRESSION:   Finding 1: solid mass in the left breast is suspicious.   Finding 2: Calcifications probably benign  Finding 3: calcifications anterior one-third lower inner region of the left breast are suspicious.   Finding 4: Complex cyst in the left breast at 9 o'clock is suspicious.   Finding 5: Benign-Negative   BIRADS Category 4B      06/24/10      Stereotactic Biopsy     [facility]      June Baca  12 core needle biopsy specimens, 9 gauge   ADDENDUM:  Pathology of the biopsied cluster of calcifications lower inner quadrant left breast has returned florid hyperplasia and ruptured ectatic duct with surrounding inflammation.   Pathology is concordant.     06/24/10 Left Ultrasound Guided Biopsy       [facility]      June Baca  1:00 position of the left breast.  12 gauge, 12 cores   ADDENDUM 06/29/10:  Pathology of the nodule at 1:00 left breast has returned hyalinized fibroadenoma and duct hyperplasia of the usual type. Pathology is concordant.     06/24/10      Pathology Report       [facility]       June Baca   1. Left Breast at 1 o'clock, biopsy:  Hyalinized fibroadenoma.  Duct hyperplasia of usual type.    2. Left Breast LIQ Biopsy:  Florid hyperplasia   Ruptured ectatic Duct    06/24/10      Left Breast Cyst Aspiration     [facility]      June Baca   A small amount of cloudy fluid was removed and sent for cytology. The cyst completely aspirated.   The cytology has returned abundant aprocrine metaplasia. Cytology is concordant.     01/04/11      Left Diagnostic Mammo     [facility]      June Baca   Finding 1: Follow up examination was preformed for the calcifications in the anterior one-third inner region of the left breast. There is a decreased number of calcifications.   LEFT BREAST ULTRASOUND:  Finding 2: There is an oval solid mass measuring 15 mm seen in the left breast at 1 o'clock.   This finding represents a biopsy proven fibroadenoma.    Finding 3: There are multiple oval areas of anechoic and hypoechoic simple and complex cysts seen in the left breast.   IMPRESSION:   Finding 1: Benign-Negative   Finding 2: Benign-Negative   Finding 3: Benign-Negative   BI-RADS Category 2: benign     05/31/11      Yearly screening mammo     [facility]      June Baca   The breast are heterogeneously dense.   Finding 1: There is an oval mass seen in the posterior one third upper outer region of the right breast.   Finding 2: There is a focal asymmetry with associated calcifications seen in the posterior one-third region of the left breast at 12 o'clock.   IMPRESSION:   Finding 1: Requires additional evaluation  Finding 2: Requires additional evaluation  BI-RADS Category 0: Incomplete    06/08/11      Mammo      [facility]      June Baca   The breast are heterogenously dense.   Finding 1: Oval mass posterior one-third 10:30 o'clock region of the right breast. Become more defined.   Finding 2: Focal asymmetry posterior one-third of the left breast at 12 o'clock has become defined.   BILATERAL ULTRASOUND:   Finding 1: Oval elongated complex cyst measuring 8 mm.  Finding 2: There is no evidence of any solid mass or abnormal cystic elements become less defined.   Finding 3: Anechic and hypoechoic sample and complex cysts seen in both breasts.   Finding 4: Solid mass in the left breast at 1 o'clock stable 15 mm in the lft breast at 1 o'clock.  IMPRESSION:   Finding 1: Complex cyst in the right breast is suspicious.   Finding 2: Benign-Negative   Finding 3: Probably benign. Follow-up ultrasound exam in 6 months is recommended.   Finding 4: Benign-Negative   BIRADS Category 4    06/15/11      Cyst Aspiration     [facility]      June Baca   Right breast 18 gauge needle 10:30 position   Demonstrates sheets of ductal cells and features and suggestive of a fibroadenoma. The findings are concordant. A follow up mammogram and ultrasound study in 3 months is recommended.      06/15/11      EXTRAVAGINAL CYTOLOGY      [facility]      June Baca   Sheets of ductal cells     06/15/11     Right Diagnostic Mammo     [facility]      June Melvinskbonifacio   Follow up oval mass in the posterior one-third region of the right breast at 10:30.   IMPRESSION:   Probably benign. Follow up mammography in 3 months   Follow up ultrasound in 3 months is recommended.   BIRADS Category 3: Probably Benign     09/16/11      Right Diagnostic Mammo     [facility]      June Baca  The breast is heterogeneously dense.  Finding 1: Follow up posterior one-third region of the right breast at 10:30. There has been no significant change from the prior exam(s).   BILATERAL ULTRASOUND:   Finding 1: No discrete of suspicious sonographic abnormality is seen at the 10 to 11:00 positions. This is combatable with resolution of the previously demonstrated complicated cyst at this site.   Finding 2: Follow up solid mass in the left breast at 1 o'clock. Consistent with biopsy proven benign fibroadenoma.   Finding 3: There is a stable round complicated cyst 4mm right breast at 12 o'clock.   Finding 4: Irregular complicated cyst versus solid mass with poorly defined margins measuring 4 mm seen in the 1:30 o'clock region of the right breast. There is central shadowing . This has slightly increased in size.   Finding 5: Stable oval complicated cyst versus solid mass with partially defined margins measuring 5 mm right breast at 3 o'clock.   Finding 6: Oval complicated cyst 5 mm seen in the right breast at 9 o'clock.   IMPRESSION:   Finding 1: Benign-Negative  Finding 2: Benign-Negative   Finding 3: Complicated cyst in the right breast at 12 o'clock is probably benign. Ultrasound exam in 6 months is recommended.   Finding 4:   Complicated cyst versus solid mass in the 1:30 o'clock region of the right breast is suspicious.   Finding 5: Complicated cyst versus solid mass in the right breast at 3 o'clock probably benign. Ultrasound exam in 6  months is recommended.   Finding 6: Complicated cyst right breast 9 o'clock probably benign. Ultrasound exam in 6 months is recommended.   BIRADS Category 4    09/26/11     Right Ultrasound Guided Biopsy      [facility]       June Baca   1:30 right breast 12 gauge 9 cores.   Pathology results concordant. Follow up ultrasound is recommended in 6 months.   Post-biopsy mammographic images demonstrate the biopsy marker clip in good position.     09/26/11      Pathology report        [facility]      June Clydenickisuzanne   Right breast at 1:30  Benign Breast Cyst with slight fibrosis of cyst wall.    03/26/12     [facility]   Finding 1: Complicated right breast at 12 o'clock.  Finding 2: Complicated cyst versus solid mass seen in the 1:30 o'clock region of the right breast. There is no evidence seen of any solid mass, enlarge axillary lymph node or abnormal cystic elements. Note is made of a biopsy clip as evidence of a previous surgical procedure.   Finding 3: Follow up complicated cyst versus solid mass right breast at 3 o'clock.   Finding has decreased in size.  Finding 4: Follow up complicated cyst right breast at 9 o'clock.   IMPRESSION: Finding 1: Stable complicated cyst in the right breast at 12 o'clock is probably benign. Ultrasound exam in 3 months is recommended.   Finding 2: Benign-Negative   Finding 3: Benign-Negative   Finding 4: Benign-Negative  BIRADS Category 3: Probably Benign    06/28/12    Bilateral Diagnostic Mammo     [facility]      June Baca  The breasts are heterogeneously dense.   Finding 1: There are round and punctate calcifications distribution seen in both breast.   RIGHT BREAST ULTRASOUND:  Finding 2: Follow up complicated cyst right breast at 12 o'clock.   Finding 3: Multiple similar complicated cysts seen in the right breast.   Finding 4: Imtramammary lymph node measuring 7 mm seen in the right breast at 9 o'clock.  IMPRESSION:   Finding 1:  "Benign-Negative  Finding 2: Stable complicated cyst in the right breast at 12 o'clock is probably benign. Follow up ultrasound of the right breast in 6 months is recommended.   Finding 3: Complicated cysts in the right breast are probably benign. Follow up ultrasound of the right breast in 6 months is recommended.   Finding 4: Intramammary lymph node in the right at 9 o'clock is benign negative.   BIRADS Category 3: Probably Benign    01/04/13    RIGHT BREAST ULTRASOUND      [facility]     June Baca   Finding 1: follow up complicated cyst in the right breast 12 o'clock.   Finding 2: Follow up complicated cyst right breast.   Finding 3: New microlobulated taller-than-wide solid mass with partially defined margins measuring 14 x 10 x 10 mm seen in the right breast.   IMPRESSION:  Finding 1: Benign-Negative   Finding 2: Benign-Negative  Finding 3: New solid mass in he right breast is suspicious.  BIRADS Category 4    01/08/13      Right Ultrasound Guided Biopsy      [facility]      June Baca    The skin ar the 10:30 position of the right breast 12 gauge 12 cores.   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 This is histologically a Grade II.   Post-clip mammogram demonstrates good position of the clip. It is located at the 10:30 position in the posterior third. This is a \"top-hat\" shaped clip.     01/08/13    Pathology     [facility]      June Baca   Right breast, 10:30, core biopsies:  Invasive mammary carcinoma, Metaplastic type, subtype adenocarcinoma with spindle cell metaplasia.   Histologic grade II (Tubular 3, Nuclear Grade Score 3, Mitotic Figure Score 1.   NO DCIS  Greatest linear extent measured at 8mm.       She has her uterus and ovaries, is priscila-postmenopausal, and takes no hormones.  Her family history includes the following:She has 2 sisters, 2 " maternal aunts, 2 paternal aunts. No breast or ovarian cancer in her family.    Since our last visit, Mrs. Baca saw Dr. Kendall from nephrology who felt that there was no reason from a renal standpoint that she could not have gadolinium. Her MRI then showed No additional findings RIGHT breast, no findings LEFt breast and no abnormal appearing nodes.  Her receptors returned.  SHe is here to review the above and for me to check for her marker.    We went to the operating room on 13 for a RIGHT lumpectomy and SLNB. - Pathology from her 13 lumpectomy and sentinel node biopsy returned as IMC, NST, metaplastic features, int grade, t3n3m1, margins clear, 0/3 nodes involved- path stage [id number]- IA. I did do the SLNB through the lumpectomy incision due to proximity.  She reports no erythema, warmth, drainage from the incision.   She reports that the pain has improved and she is no longer taking narcotic. She is on a bowel regimen.    Since our last visit, Mrs. Baca has done well. She has noted no changes in her breast exam. No new masses, skin changes, nipple changes, nipple discharge either breast.     I placed a LEFT port on 13.  She is to complete dose-dense AC-followed by weekly taxane --started  3-11-13  and scheduled to finish 13.  Her port has functioned well.  She had some ITP and just recently mild neuropathy from the taxane.  Her most recent imaging includes the followin13       [facility]      Bilateral Diagnostic Mammo       June Baca     The breasts are heterogeneously dense.   Finding 1: Followup solid mass in an area of prior lumpectomy.   Finding 2: Fat containing focal asymmetry is in an area of prior lumpectomy.   Finding 3: Followup calcifications in the left breast. There are stable dystrophic calcifications with grouped distribution in the posterior one-third upper outer region of the left breast.   IMPRESSION:   Finding 1: Benign-Negative   Finding 2:  Benign-Negative   Finding 3: Benign-Negative   BIRADS Category 2: Benign     Since our last visit, Mrs. Baca has done well.   She has noted no changes in her breast exam. No new masses, skin changes outside of radiation, nipple changes, nipple discharge either breast.  She denies any new headache, vision changes, belly pain, bone pain, cough.  She completed chemotherapy 13. She started radiation from 13 through 13. She started tamoxifen 13.   She has had a 6 # weight gain since our last visit, she feels related to the holidays.    In the interim, Mrs. Baca has done well.    She has noted that she has fatigue that has not resolved.  She has gained 11#.  She has hot flashes from the tamoxifen and started on effexor yesterday for this.    She has noted no new masses, skin changes, nipple changes, nipple discharge either breast.    Her most recent imaging includes the followin/10/14         [facility]       Bilateral Screening Mammogram with Tomosynthesis        The breasts are heterogenously dense.   Finding 1: Fat containing post surgical scar the upper outer region of the right breast.   Consistent with fat necrosis.   Finding 2: Few stable dystopic calcification with group distribution seen in the posterior one-third upper outer region of the left breast.   IMPRESSION:  Finding 1: Benign Negative   Finding 2: Benign Negative   BIRADS category 2: Benign     Her  fell while running and fractured his humerus.        In the interim,  Mrs. Baca  has done well.  She has noted no changes in her breast exam. No new masses, skin changes,  nipple changes, nipple discharge either breast.   She denies headache, bone pain, belly pain, cough, changes in vision or gait.  Her most recent imaging includes the followin/13/15          [facility]          Bilateral Screening Mammogram          June Baca                                               with Tomosynthesis    Finding 1:  Low  density, oval mass upper outer region right breast with dystrophic calcifications consistent with post surgical and post radiation therapy changes.    Finding 2:  Stable oval mass posterior one-third 1:30 o'clock region of the left breast.    This finding represents a biopsy proven fibroadenoma.    IMPRESSION:  FINDING 1:  BENIGN-NEGATIVE  FINDING 2:  BENIGN-NEGATIVE    BI-RADS Category 2:  Benign    She continues the tamoxifen.    Interval HIstory:  In the interim,  June Baca  has done well.  She switched from tamoxifen to evista in  and is tolerating this well.  SHe has gained 3 # (stable weight)      She has noted no changes in her breast exam. No new masses, skin changes, nipple changes, nipple discharge either breast.   She denies headache, bone pain, belly pain, cough, changes in vision or gait.  Her most recent imaging includes the followin2016 [facility]   Mammo  June Osoffsky  BILATERAL SCREENING MAMMOGRAM WITH TOMOSYNTHESIS  Heterogeneously dense.  Finding 1: Upper outer right breast. Area of prior lumpectomy.  Finding 2: Posterior one-third 1:30 region of the left breast.  IMPRESSION:  Follow-up mammogram in 1 year is recommended.  BIRADS Category: 2, Benign.    2017 [facility]   Mammo  June Osoffsky  BILATERAL SCREENING MAMMOGRAM WITH TOMOSYNTHESIS  Heterogeneously dense.  Finding 1: Area of prior lumpectomy.  Finding 2: 1:30 region of the left breast.  Biopsy proven fibroadenoma.  IMPRESSION:  Follow-up mammogram in 1 year is recommended.  BIRADS category: 2, Benign.    2018 [facility]   Mammo  June Osoffsky  BILATERAL SCREENING MAMMOGRAM WITH TOMOSYNTHESIS  Heterogeneously dense.  Finding 1: upper outer right breast. Area of prior lumpectomy.  Finding 2: 1:30 region of the left breast.  Biopsy proven fibroadenoma.  IMPRESSION:  Follow-up mammogram in 1 year is recommended.  BIRADS Category: 2,  "Benign.      07/29/2019       [facility]                          Radiology                       June Melvinskbonifacio  Bilateral Screening Mammogram with Tomosynthesis    Heterogeneously dense,   Finding 1:  posterior one-third upper outer region of the right breast located 6 centimeters from the nipple. Mass is in a area of prior lumpectomy.    Finding 2:  1:30 o’clock region of the left breast/    Biopsy proven fibroadenoma.    Impression:  Finding 1: Mass in the right breast requires additional evaluation.    BI-RADS Category 0: Incomplete    08/13/2019 [facility]   Radiology  [facility]  RIGHT DIAGNOSTIC MAMMOGRAM WITH TOMOSYNTHESIS  Heterogeneously dense.  Oval mass in the right breast, upper outer. Equal density, oval mass measuring 7 millimeters, 10:30 6 centimeters from the nipple. This is adjacent to the lumpectomy bed.  RIGHT BREAST ULTRASOUND  Oval elongated solid mass with partially defined margins measuring 11 x 7 x 13 mm.  IMPRESSION:  Breast is suspicious.  BIRADS Category: 4B, Suspicious.      She had a biopsy on the following day that showed:   01/08/13      Right Ultrasound Guided Biopsy      [facility]      June Melvinskbonifacio    The skin ar the 10:30 position of the right breast 12 gauge 12 cores.   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 This is histologically a Grade II.   Post-clip mammogram demonstrates good position of the clip. It is located at the 10:30 position in the posterior third. This is a \"top-hat\" shaped clip.     01/08/13    Pathology     [facility]      June Baca   Right breast, 10:30, core biopsies:  Invasive mammary carcinoma, Metaplastic type, subtype adenocarcinoma with spindle cell metaplasia.   Histologic grade II (Tubular 3, Nuclear Grade Score 3, Mitotic Figure Score 1.   NO DCIS  Greatest " linear extent measured at 8mm.   - ER 10%,CT 0%, her 2 corey 0%, Ki 67 56%. (essentially basal phenotype).     1-30-13  RIGHT lumpectomy and SLNB. - IMC, NST, metaplastic features, int grade, t3n3m1, margins clear, 0/3 nodes involved- path stage [id number]- IA.       08/28/2019     [facility]                 US Guided Breast Biopsy                    June Clydenickisuzanne  11-gauge mammotome. Multiple tissue specimens metallic clip was placed to vishal the site.    The pathology results returned as dense stromal fibrosis with fat necrosis.  This is concordant with  Imaging findings.    08/28/2019 [facility]   Pathology  June Melvinsuzanne  08/28/19 @ 10:49  Right breast mass.  1. Right breast at 10:30, 9-10 CMFN, Core Biopsy:  A. Dense stromal fibrosis, organizing fat necrosis and focal chronic nonspecific inflammation.  B. Scattered microcalcification.  C. No in situ nor infiltrating carcinoma identified by routine staining.    She has had several breast biopsies in the past.  She has a history of breast cancer, and we performed a lumpectomy in 2010 for this breast cancer, see treatment summary under past history of breast cancer under assessment and plan.  Uses vaginal estrogen.  She also takes Evista.  Her family history includes the following: She has no children, one brother, 2 sisters, 2 maternal aunts, 2 paternal aunts.  One maternal uncle.  One maternal aunt had breast cancer at age 73.  No history of other breast or ovarian cancer.  She is here for evaluation.    Review of Systems:  Review of Systems   Respiratory: Positive for cough.    Endocrine: Positive for hot flashes.   Genitourinary: Positive for dyspareunia.    Musculoskeletal: Positive for back pain.   All other systems reviewed and are negative.       Past Medical and Surgical History:  Breast Biopsy History:  Patient has had the following breast biopsies:She has had a total of 4 breast biopsies,  percutaneous, as well as several cyst  aspirations- all at [facility], This includes her malignant 2013 diagnosis.  2019 right breast biopsy [facility].   Breast Cancer HIstory:  Patient has the following past medical history of breast cancer treatment:  Breast Operations, and year:  Right breast invasive mammary carcinoma.   Obstetric/Gynecologic History:  Age menstrual periods began: 15  Patient is postmenopausal, entered menopause naturally at age:  [age]   Number of pregnancies:0  Number of live births: 0  Number of abortions or miscarriages: 0  Age of delivery of first child: n/a   breast feeding: n/a   Length of time taking birth control pills: 10-15 yrs   Patient is presently taking the following hormone replacement:   vagifem vaginal tablet weekly.   Patient has both ovaries and uterus.     Past Surgical History:   Procedure Laterality Date   • BREAST BIOPSY     • BREAST LUMPECTOMY Right 01/30/2013   • BREAST LUMPECTOMY WITH SENTINEL NODE BIOPSY Right 01/30/2013   • BREAST SURGERY  1/30/13    lumpectomy   • COLONOSCOPY  2009?   • CYSTECTOMY     • D&C AND LAPAROSCOPY     • HERNIA REPAIR  1987   • LAPAROSCOPIC OVARIAN CYSTECTOMY     • LYMPH NODE BIOPSY  1/30/13    related to breast cancer   • MEDIPORT INSERTION, SINGLE  02/27/2013    DR. MIRANDA     Past Medical History:   Diagnosis Date   • Allergic 1965    seasonal, lifelong   • Depression March 1998    several months only   • Drug therapy 03/12/2013   • Endometriosis    • Hot flashes due to tamoxifen    • Hx of radiation therapy 08/02/2013    Right breast   • Inguinal hernia    • Invasive ductal carcinoma of breast (CMS/HCC)     RIGHT WITH METAPLASTIC FEATURES (SPINDLE CELL METAPLASIA) 1.5CM GRADE 2, ER WEAKLY POSITIVE, DE NEGATIVE, HER-2/SOLITARIO NEGATIVE   • Low back pain 1/1991    usually mild, chiropractic maintenance   • Ovarian cyst    • Peripheral neuropathy     RELATED TO TAXOL   • Pneumonia 1965 & 1978   • Postmenopausal    • STD (sexually transmitted disease)     HPV genital warts   • Uterine polyp   "      Prior Hospitalizations, other than for surgery or childbirth, and year:  None     Social History     Socioeconomic History   • Marital status:      Spouse name: Drew   • Number of children: Not on file   • Years of education: College   • Highest education level: Not on file   Occupational History   • Occupation:      Employer: [employer]   Tobacco Use   • Smoking status: Never Smoker   • Smokeless tobacco: Never Used   Substance and Sexual Activity   • Alcohol use: Yes     Alcohol/week: 1.2 - 1.8 oz     Types: 1 Cans of beer, 1 - 2 Shots of liquor per week     Comment: 5-7 GLASSES OF WINE PER WEEK, DRINKS [place]   • Drug use: No   • Sexual activity: Yes     Partners: Male     Birth control/protection: Post-menopausal, None     Comment: little or no intercourse last 5 yrs., PT (dilators) 2017   Social History Narrative    Lives at home with      Patient is .  Patient is employed full time with the following occupation: library asst and musician  Patient drinks 2 servings of caffeine per day.    Family History:  Family History   Problem Relation Age of Onset   • Diabetes Mother         type 2   • Hypertension Father    • Prostate cancer Father    • Cancer Father         prostate, 12(?) yrs. healthy   • Hearing loss Father         resulting from shingles   • Heart disease Father         TAVR procedure 2016   • Cancer Maternal Aunt         pancreatic,  2017 age 83   • Cancer Maternal Aunt         stage 4 breast cancer, diagnosed 2017   • Breast cancer Maternal Aunt        Vital Signs:  /68 (BP Location: Left arm, Patient Position: Sitting, Cuff Size: Adult)   Pulse 79   Ht 156.2 cm (61.5\")   Wt 56.2 kg (124 lb)   LMP  (LMP Unknown)   SpO2 98%   Breastfeeding? No   BMI 23.05 kg/m²      Medications:    Current Outpatient Medications:   •  estradiol (VAGIFEM) 10 MCG tablet vaginal tablet, 1 (One) Time Per Week., Disp: , Rfl: " "  •  Fluticasone Propionate (FLONASE ALLERGY RELIEF NA), into each nostril., Disp: , Rfl:   •  levocetirizine (XYZAL) 5 MG tablet, Take 5 mg by mouth 2 (Two) Times a Day., Disp: , Rfl:   •  raloxifene (EVISTA) 60 MG tablet, TAKE 1 TABLET DAILY, Disp: 90 tablet, Rfl: 1  •  Ascorbic Acid (VITAMIN C ER) 1000 MG tablet controlled-release, Take  by mouth., Disp: , Rfl:   •  B Complex Vitamins (B COMPLEX PO), Take  by mouth., Disp: , Rfl:   •  Cholecalciferol (VITAMIN D) 1000 UNITS tablet, Take 1,000 Units by mouth., Disp: , Rfl:   •  Multiple Vitamins-Minerals (MULTIVITAMIN ADULT PO), Take  by mouth., Disp: , Rfl:   •  pseudoephedrine (SUDAFED) 30 MG tablet, Take 30 mg by mouth as needed for congestion., Disp: , Rfl:      Allergies:  Allergies   Allergen Reactions   • Latex Rash       Physical Examination:  /68 (BP Location: Left arm, Patient Position: Sitting, Cuff Size: Adult)   Pulse 79   Ht 156.2 cm (61.5\")   Wt 56.2 kg (124 lb)   LMP  (LMP Unknown)   SpO2 98%   Breastfeeding? No   BMI 23.05 kg/m²   General Appearance:  Patient is in no distress.  She is well kept and has an overweight build.   Psychiatric:  Patient with appropriate mood and affect. Alert and oriented to self, time, and place.    Breast, RIGHT:  medium sized, 34B, symmetric with the contralateral side.  Breast skin is without erythema, edema, rashes.  There are no visible abnormalities upon inspection during the arm-raising maneuver or with hands on hips in the sitting position. There is no nipple retraction, discharge or nipple/areolar skin changes.There are no masses palpable in the sitting or supine positions.  Well-healed radial incision right breast upper outer quadrant with some telengiectasias at the incision site from radiation.      Breast, LEFT:  medium sized, 34B,  symmetric with the contralateral side.  Breast skin is without erythema, edema, rashes.  There are no visible abnormalities upon inspection during the arm-raising " maneuver or with hands on hips in the sitting position. There is no nipple retraction, discharge or nipple/areolar skin changes.There are no masses palpable in the sitting or supine positions.    Lymphatic:  There is no axillary, cervical, infraclavicular, or supraclavicular adenopathy bilaterally.  Eyes:  Pupils are round and reactive to light.  Cardiovascular:  Heart rate and rhythm are regular.  Respiratory:  Lungs are clear bilaterally with no crackles or wheezes in any lung field.  Gastrointestinal:  Abdomen is soft, nondistended, and nontender.   Musculoskeletal:  Good strength in all 4 extremities.   There is good range of motion in both shoulders.    Skin:  No new skin lesions or rashes on the skin excluding the breast (see breast exam above).        Imagin12    [facility]     [facility]  The breasts are heterogeneously dense.   Finding 1: There are round and punctate calcifications distribution seen in both breast.   RIGHT BREAST ULTRASOUND:  Finding 2: Follow up complicated cyst right breast at 12 o'clock.   Finding 3: Multiple similar complicated cysts seen in the right breast.   Finding 4: Imtramammary lymph node measuring 7 mm seen in the right breast at 9 o'clock.  IMPRESSION:   Finding 1: Benign-Negative  Finding 2: Stable complicated cyst in the right breast at 12 o'clock is probably benign. Follow up ultrasound of the right breast in 6 months is recommended.   Finding 3: Complicated cysts in the right breast are probably benign. Follow up ultrasound of the right breast in 6 months is recommended.   Finding 4: Intramammary lymph node in the right at 9 o'clock is benign negative.   BIRADS Category 3: Probably Benign    13    RIGHT BREAST ULTRASOUND      [facility]   Finding 1: follow up complicated cyst in the right breast 12 o'clock.   Finding 2: Follow up complicated cyst right breast.   Finding 3: New microlobulated taller-than-wide  solid mass with partially defined margins measuring 14 x 10 x 10 mm seen in the right breast.   IMPRESSION:  Finding 1: Benign-Negative   Finding 2: Benign-Negative  Finding 3: New solid mass in he right breast is suspicious.  BIRADS Category 4    MRI as below- [facility] 1-25-13    IMPRESSION-      1. A 1.5 cm irregular enhancing mass within the axillary tail region of      the right breast representing a biopsy proven site of malignancy. No      evidence for an axillary lymphadenopathy is appreciated. The patient      appears to be a candidate for breast conservation therapy.      2. There are no findings suspicious for malignancy in the left breast.      3.  Scattered stippled foci of variable enhancement throughout both      breasts that are most consistent with moderate fibrocystic breast      changes.    1-16-13 CXR- no abnormal findings.      01/25/13      Bilateral Breast MRI      June Rees  FINDING: Located within the axillary tail region of the right breast is an irregular mass that measures 1.4cm medial to lateral 1.4 cm anterior to posterior and 1.5 cm superior to inferior. A metallic clip is located within the mass. The mass is located close to the chest wall but there is intervening fat interposed between the fat and the pectoralis fascia.   There are no other area of suspicious enhancement or morphology within the right breast. There is no evidence for an axillary lymphadenopathy.   There are no findings suspicious for malignancy in the left breast.     07/09/13       [facility]      Bilateral Diagnostic Mammo with tomosynthesis      June Baca   The breasts are heterogeneously dense.   Finding 1: Followup solid mass in an area of prior lumpectomy.   Finding 2: Fat containing focal asymmetry is in an area of prior lumpectomy.   Finding 3: Followup calcifications in the left breast. There are stable dystrophic calcifications with grouped distribution in the posterior one-third upper  outer region of the left breast.   IMPRESSION:   Finding 1: Benign-Negative   Finding 2: Benign-Negative   Finding 3: Benign-Negative   BIRADS Category 2: Benign     07/10/14         [facility]       Bilateral Screening Mammogram with Tomosynthesis      The breasts are heterogenously dense.   Finding 1: Fat containing post surgical scar the upper outer region of the right breast.   Consistent with fat necrosis.   Finding 2: Few stable dystopic calcification with group distribution seen in the posterior one-third upper outer region of the left breast.   IMPRESSION:  Finding 1: Benign Negative   Finding 2: Benign Negative   BIRADS category 2: Benign     07/13/15          [facility]          Bilateral Screening Mammogram          Keven, June E.                                               with Tomosynthesis    Finding 1:  Low density, oval mass upper outer region right breast with dystrophic calcifications consistent with post surgical and post radiation therapy changes.    Finding 2:  Stable oval mass posterior one-third 1:30 o'clock region of the left breast.    This finding represents a biopsy proven fibroadenoma.    IMPRESSION:  FINDING 1:  BENIGN-NEGATIVE  FINDING 2:  BENIGN-NEGATIVE    BI-RADS Category 2:  Benign    07/14/2016 [facility]   Mammo  June Osoffsky  BILATERAL SCREENING MAMMOGRAM WITH TOMOSYNTHESIS  Heterogeneously dense.  Finding 1: Upper outer right breast. Area of prior lumpectomy.  Finding 2: Posterior one-third 1:30 region of the left breast.  IMPRESSION:  Follow-up mammogram in 1 year is recommended.  BIRADS Category: 2, Benign.    07/20/2017 [facility]   Mammo  June Osoffsky  BILATERAL SCREENING MAMMOGRAM WITH TOMOSYNTHESIS  Heterogeneously dense.  Finding 1: Area of prior lumpectomy.  Finding 2: 1:30 region of the left breast.  Biopsy proven fibroadenoma.  IMPRESSION:  Follow-up mammogram in 1 year is recommended.  BIRADS category: 2, Benign.    07/27/2018 [facility]   [facility]  [facility]  BILATERAL SCREENING MAMMOGRAM WITH TOMOSYNTHESIS  Heterogeneously dense.  Finding 1: upper outer right breast. Area of prior lumpectomy.  Finding 2: 1:30 region of the left breast.  Biopsy proven fibroadenoma.  IMPRESSION:  Follow-up mammogram in 1 year is recommended.  BIRADS Category: 2, Benign.      07/29/2019       [facility]                          Radiology                       [facility]  Bilateral Screening Mammogram with Tomosynthesis    Heterogeneously dense,   Finding 1:  posterior one-third upper outer region of the right breast located 6 centimeters from the nipple. Mass is in a area of prior lumpectomy.    Finding 2:  1:30 o’clock region of the left breast/    Biopsy proven fibroadenoma.    Impression:  Finding 1: Mass in the right breast requires additional evaluation.    BI-RADS Category 0: Incomplete    08/13/2019 [facility]   [facility]  [facility]STIC MAMMOGRAM WITH TOMOSYNTHESIS  Heterogeneously dense.  Oval mass in the right breast, upper outer. Equal density, oval mass measuring 7 millimeters, 10:30 6 centimeters from the nipple. This is adjacent to the lumpectomy bed.  RIGHT BREAST ULTRASOUND  Oval elongated solid mass with partially defined margins measuring 11 x 7 x 13 mm.  IMPRESSION:  Breast is suspicious.  BIRADS Category: 4B, Suspicious.    Pathology:  01/08/13      Right Ultrasound Guided Biopsy      [facility]    The skin ar the 10:30 position of the right breast 12 gauge 12 cores.   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 This is histologically a Grade II.   Post-clip mammogram demonstrates good position of the clip. It is located at the 10:30 position in the posterior third. This is a \"top-hat\" shaped clip. "     01/08/13    Pathology     PCA      June Baca   Right breast, 10:30, core biopsies:  Invasive mammary carcinoma, Metaplastic type, subtype adenocarcinoma with spindle cell metaplasia.   Histologic grade II (Tubular 3, Nuclear Grade Score 3, Mitotic Figure Score 1.   NO DCIS  Greatest linear extent measured at 8mm.   - ER 10%,IL 0%, her 2 corey 0%, Ki 67 56%. (essentially basal phenotype).     1-30-13  RIGHT lumpectomy and SLNB. - IMC, NST, metaplastic features, int grade, t3n3m1, margins clear, 0/3 nodes involved- path stage [id number]- IA.       08/28/2019     [facility]                 US Guided Breast Biopsy                    June Baca  11-gauge mammotome. Multiple tissue specimens metallic clip was placed to vishal the site.    The pathology results returned as dense stromal fibrosis with fat necrosis.  This is concordant with  Imaging findings.    08/28/2019 [facility]   Pathology  June Baca  08/28/19 @ 10:49  Right breast mass.  2. Right breast at 10:30, 9-10 CMFN, Core Biopsy:  D. Dense stromal fibrosis, organizing fat necrosis and focal chronic nonspecific inflammation.  E. Scattered microcalcification.  F. No in situ nor infiltrating carcinoma identified by routine staining.    Procedures:      Assessment:   Diagnosis Plan   1. Malignant neoplasm of upper-outer quadrant of right breast in female, estrogen receptor positive (CMS/HCC)     2. Abnormal mammogram     3. Abnormal ultrasound of breast     4. Fat necrosis of breast     1-  RIGHT breast cancer, 10:00 5.5 CFN-   1.4 cm x 1.0 cm on ultrasound, post third on mammogram-  IMC, metaplastic type, adenocarcinoma with spindle metaplasia, 8mm largest on core, int grade, t3n3m1, no DCIS. 1.5 cm on MRI/posterior third.  Clinical stage [id number]- IA  - ER 10%,IL 0%, her 2 corey 0%, Ki 67 56%. (essentially basal phenotype).     1-30-13  RIGHT lumpectomy and SLNB. - IMC, NST, metaplastic features, int grade, t3n3m1, margins  clear, 0/3 nodes involved- path stage [id number]- IA. I did do the SLNB through the lumpectomy incision due to proximity.    Dr. Bishop- dose-dense AC-followed by weekly taxane --started  3-11-13  - 7-22-13.   Tamoxifen 8-12-13 start.  Switched to [place] 2016    10/02/13      [facility] Radiation        Mary Schilling M.D.      June Cornell of Treatment: 08/13/2013-09/27/2013.  Total Dose in cGy-5040  Tumor bed boost  Total Dose in cGy-1000      2-4  Right breast 1030, 6 cm from the nipple, 1.3 cm on ultrasound.  August 2019 core biopsy returned as stromal fibrosis, fat necrosis, chronic inflammation, no atypia.  Concordant.  Return to routine imaging.  Procedure was performed by Dr. Sue at [facility].  [facility]    Plan:  Stormy is doing well today.  She comes to me due to abnormal imaging that she recently had.  We reviewed her imaging, interval history, imaging reports, examination and pathology together today.  She is 6 years out from her diagnosis of a right breast metaplastic carcinoma.  She continues the GALE today with Dr. Bishop and is tolerating this well.  We were pleased that the area on her imaging returned as fat necrosis and fibrosis and this is felt to be concordant.  Kahlil the nature of fat necrosis and fibrosis and the concept of concordance.  There was no palpable finding on examination today nor per patient report.  [facility].    Have not given her a routine follow-up in our office.  She does have a routine follow-up with Dr. Bishop.  I asked her to continue her self breast exam and to call us in the future with any concerns would be happy to see her back.    Faiza Arita MD    30 min visit, 20 in face to face     Next Appointment:  Return for any future concerns.      EMR Dragon/transcription disclaimer:    Much of this encounter  note is an electronic transcription/translocation of spoken language to printed text.  The electronic translation of spoken language may permit erroneous, or at times, nonsensical words or phrases to be inadvertently transcribed.  Although I have reviewed the note from such areas, some may still exist.

## 2019-09-24 ENCOUNTER — OFFICE VISIT (OUTPATIENT)
Dept: ONCOLOGY | Facility: CLINIC | Age: 64
End: 2019-09-24

## 2019-09-24 ENCOUNTER — LAB (OUTPATIENT)
Dept: LAB | Facility: HOSPITAL | Age: 64
End: 2019-09-24

## 2019-09-24 VITALS
HEART RATE: 86 BPM | OXYGEN SATURATION: 96 % | BODY MASS INDEX: 23.96 KG/M2 | RESPIRATION RATE: 18 BRPM | TEMPERATURE: 99.2 F | HEIGHT: 61 IN | DIASTOLIC BLOOD PRESSURE: 69 MMHG | SYSTOLIC BLOOD PRESSURE: 102 MMHG | WEIGHT: 126.9 LBS

## 2019-09-24 DIAGNOSIS — M79.89 FAT NECROSIS: ICD-10-CM

## 2019-09-24 DIAGNOSIS — N63.10 MASS OF RIGHT BREAST: ICD-10-CM

## 2019-09-24 DIAGNOSIS — C50.011 MALIGNANT NEOPLASM OF AREOLA OF RIGHT BREAST IN FEMALE, UNSPECIFIED ESTROGEN RECEPTOR STATUS (HCC): Primary | ICD-10-CM

## 2019-09-24 LAB
ALBUMIN SERPL-MCNC: 4 G/DL (ref 3.5–5.2)
ALBUMIN/GLOB SERPL: 1.3 G/DL (ref 1.1–2.4)
ALP SERPL-CCNC: 67 U/L (ref 38–116)
ALT SERPL W P-5'-P-CCNC: 11 U/L (ref 0–33)
ANION GAP SERPL CALCULATED.3IONS-SCNC: 13.4 MMOL/L (ref 5–15)
AST SERPL-CCNC: 22 U/L (ref 0–32)
BASOPHILS # BLD AUTO: 0.07 10*3/MM3 (ref 0–0.2)
BASOPHILS NFR BLD AUTO: 1.1 % (ref 0–1.5)
BILIRUB SERPL-MCNC: 0.3 MG/DL (ref 0.2–1.2)
BUN BLD-MCNC: 19 MG/DL (ref 6–20)
BUN/CREAT SERPL: 19.8 (ref 7.3–30)
CALCIUM SPEC-SCNC: 9.1 MG/DL (ref 8.5–10.2)
CHLORIDE SERPL-SCNC: 104 MMOL/L (ref 98–107)
CO2 SERPL-SCNC: 23.6 MMOL/L (ref 22–29)
CREAT BLD-MCNC: 0.96 MG/DL (ref 0.6–1.1)
DEPRECATED RDW RBC AUTO: 47.8 FL (ref 37–54)
EOSINOPHIL # BLD AUTO: 0.24 10*3/MM3 (ref 0–0.4)
EOSINOPHIL NFR BLD AUTO: 3.7 % (ref 0.3–6.2)
ERYTHROCYTE [DISTWIDTH] IN BLOOD BY AUTOMATED COUNT: 12.9 % (ref 12.3–15.4)
GFR SERPL CREATININE-BSD FRML MDRD: 59 ML/MIN/1.73
GLOBULIN UR ELPH-MCNC: 3 GM/DL (ref 1.8–3.5)
GLUCOSE BLD-MCNC: 89 MG/DL (ref 74–124)
HCT VFR BLD AUTO: 38.2 % (ref 34–46.6)
HGB BLD-MCNC: 12.1 G/DL (ref 12–15.9)
IMM GRANULOCYTES # BLD AUTO: 0.02 10*3/MM3 (ref 0–0.05)
IMM GRANULOCYTES NFR BLD AUTO: 0.3 % (ref 0–0.5)
LYMPHOCYTES # BLD AUTO: 2.21 10*3/MM3 (ref 0.7–3.1)
LYMPHOCYTES NFR BLD AUTO: 34.4 % (ref 19.6–45.3)
MCH RBC QN AUTO: 31.8 PG (ref 26.6–33)
MCHC RBC AUTO-ENTMCNC: 31.7 G/DL (ref 31.5–35.7)
MCV RBC AUTO: 100.3 FL (ref 79–97)
MONOCYTES # BLD AUTO: 0.61 10*3/MM3 (ref 0.1–0.9)
MONOCYTES NFR BLD AUTO: 9.5 % (ref 5–12)
NEUTROPHILS # BLD AUTO: 3.27 10*3/MM3 (ref 1.7–7)
NEUTROPHILS NFR BLD AUTO: 51 % (ref 42.7–76)
NRBC BLD AUTO-RTO: 0 /100 WBC (ref 0–0.2)
PLATELET # BLD AUTO: 272 10*3/MM3 (ref 140–450)
PMV BLD AUTO: 10.1 FL (ref 6–12)
POTASSIUM BLD-SCNC: 4.1 MMOL/L (ref 3.5–4.7)
PROT SERPL-MCNC: 7 G/DL (ref 6.3–8)
RBC # BLD AUTO: 3.81 10*6/MM3 (ref 3.77–5.28)
SODIUM BLD-SCNC: 141 MMOL/L (ref 134–145)
WBC NRBC COR # BLD: 6.42 10*3/MM3 (ref 3.4–10.8)

## 2019-09-24 PROCEDURE — 99214 OFFICE O/P EST MOD 30 MIN: CPT | Performed by: INTERNAL MEDICINE

## 2019-09-24 PROCEDURE — 85025 COMPLETE CBC W/AUTO DIFF WBC: CPT | Performed by: INTERNAL MEDICINE

## 2019-09-24 PROCEDURE — G0463 HOSPITAL OUTPT CLINIC VISIT: HCPCS | Performed by: INTERNAL MEDICINE

## 2019-09-24 PROCEDURE — 80053 COMPREHEN METABOLIC PANEL: CPT | Performed by: INTERNAL MEDICINE

## 2019-09-24 PROCEDURE — 36415 COLL VENOUS BLD VENIPUNCTURE: CPT | Performed by: INTERNAL MEDICINE

## 2019-09-24 RX ORDER — PSEUDOEPHEDRINE HCL 120 MG/1
120 TABLET, FILM COATED, EXTENDED RELEASE ORAL EVERY 12 HOURS
COMMUNITY

## 2019-09-24 NOTE — PROGRESS NOTES
Subjective .     REASONS FOR FOLLOWUP:   1. 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 Status post lumpectomy, sentinel lymph node biopsy 01/30 /2013.    2. Status post Mediport placement 02/27/2013 by Dr. Arita.    3. Baseline echocardiogram 03/07/2013 with normal ejection fraction of 61%.    4. Patient started on adjuvant chemotherapy with Adriamycin and Cytoxan on 3/11/2013 for 4 cycles, followed by weekly Taxol.    5. 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    6. Peripheral neuropathy related to weekly Taxol.   7. Mild neutropenia related to chemotherapy, required Neupogen for marrow support.    8. Moderate thrombocytopenia detected on 11/21/2014. The patient is here for further evaluation.   9. Patient switched from tamoxifen after 3 years to Evista as she could not tolerate tamoxifen and refused Arimidex.  10. 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       HISTORY OF PRESENT ILLNESS:  The patient is a 63 y.o. year old female who is here for follow-up with the above-mentioned history.    History of Present Illness     The patient is a 60-year-old female with the above history, currently here for follow-up.    Patient had screening mammogram July 29, 2019.  There is a high density oval mass with dystrophic calcifications, skin thickening and postsurgical scar in the posterior one third upper outer quadrant of the right breast.  This density appears increased compared to the prior exam.  There is also stable oval mass with calcifications at 1:30 position in the left breast.  This is thought to be a biopsy-proven fibroadenoma.    August 15, 2019 right diagnostic mammogram showed dense breasts and the density shows oval mass 7 mm with dystrophic  calcification in the posterior one third 1030 region of the right breast.    Ultrasound shows a solid mass which is 11 x 7 x 13 mm which is thought to be suspicious.  Biopsy is recommended.    Patient states that on exam she does not feel the mass.    Interval history: Patient underwent ultrasound-guided right breast biopsy with placement of metallic clips.  The pathology returned benign.  Pathology showed dense stromal fibrosis with organizing fat necrosis and focal chronic nonspecific inflammation.  There was no evidence of any in situ or infiltrating carcinoma identified.  Patient was seen by Dr. Arita on September 3, 2019.  She is to follow-up with routine mammograms.  She is currently 6 years out and on Evista and tolerating well.    PAST MEDICAL HISTORY: Significant for laparoscopic surgery at [place] for manuel rine polyps, ovarian cyst and endometriosis. She had ingrown toenails x2 and has had surgery on the toenails by Dr. Robbin Garcia. She has had D and C in the past for irregular bleeding. Inguinal hernia surgery on the right in .      OB/GYN HISTORY: Menarche age 15. She is postmenopausal; last LMP 2012.  0.      Past Medical History:   Diagnosis Date   • Allergic     seasonal, lifelong   • Depression 1998    several months only   • Drug therapy 2013   • Endometriosis    • Hot flashes due to tamoxifen    • Hx of radiation therapy 2013    Right breast   • Inguinal hernia    • Invasive ductal carcinoma of breast (CMS/HCC)     RIGHT WITH METAPLASTIC FEATURES (SPINDLE CELL METAPLASIA) 1.5CM GRADE 2, ER WEAKLY POSITIVE, ND NEGATIVE, HER-2/SOLITARIO NEGATIVE   • Low back pain 1991    usually mild, chiropractic maintenance   • Ovarian cyst    • Peripheral neuropathy     RELATED TO TAXOL   • Pneumonia  &    • Postmenopausal    • STD (sexually transmitted disease)     HPV genital warts   • Uterine polyp        ONCOLOGIC HISTORY:  (History from previous dates can be found  in the separate document.)  ONCOLOGIC HISTORY: The patient has had multiple cysts in both breasts requiring aspiration in the past and path has always been negative for malignancy back in March of 2006 and in June of 2010 subsequently she underwent aspiration even in June of 2011, w hich all were suggestive of fibroid adenoma. She has fibrocystic disease. More recently she underwent a mammogram on 06/28/12 - at that time she had a digital diagnostic mammogram. She had calcifications in both breasts which were thought to be benign and negative. She had a stable complicated cyst in the right breast at 12 o' clock position which was thought to be benign. Followup ultrasound of the right breast was recommended in six months. The complicated cyst in the right breast probably benign; however there was an inframammary lymph node in the right breast at 9 o 'clock position which was also thought to be benign. Hence a six month followup was suggested with ultrasound.     Subsequently on 01/04/13 she underwent right breast ultrasound, which showed that there was a stable round complicated cyst with thin margins measuring 3 mm from the right breast at 12 o' clock position. There were multiple stable oval elongated complicated cysts in the right breast with thin margins, which were thought to be maciel ign, but there was a new microlobulated taller-than-wide solid mass with partially defined margins, which was 1.4 cm in the right breast. Because of this new solid mass in the right breast, which was suspicious, ultrasound guided vacuum-assisted biopsy w as recommended. The biopsy was done on 01/08/13 - the right breast ten-thirty position core biopsy showed invasive mammary carcinoma, metaplastic-type, subtype adenocarcinoma with spindle cell metaplasia. It was a grade 2 tumor with a tubular differentiat ion score of 3, high nuclear grade score of 3 and mitotic figure score of 1. There was one mitotic figure per 10 HPF. There was no  DCIS identified. The Accession # on this was [id number]. Estrogen receptor was 10%, progesterone receptor 0%, HER2/corey w as 0% and Ki-67 as elevated at 56%. She underwent a chest x-ray on 01/16/13 which showed that there were no suspicious pulmonary opacities seen. There was slight scarring on both lung apices. MRI of the breast was done on 01/21/13 which showed 1.5 cm i r regular enhancing mass within the axillary tale region of the right breast representing the biopsy proven site of malignancy. No evidence of axillary adenopathy was appreciated. There were no findings suspicious for malignancy in the left breast. She d i d have some scattered foci of variable enhancement thought to be secondary to moderate fibrocystic breast changes. Subsequently patient was referred to Dr. Arita, who did a right breast ultrasound-guided lumpectomy with a right axillary sentinel lymph node dissection with lymphoscintigraphic guidance on 01/30/13. The pathology report (Accession # [id number]) showed sentinel lymph node #1 in the right axilla had 2 nodes, a smaller node which did not show any malignancy and the larger node also did not sh o w malignancy. The sentinel lymph node #2 in the right axilla did not show any malignancy. The right breast lumpectomy specimen did show invasive ductal carcinoma with metaplastic features. The margins of resection were negative. The right breast additi o nal superior margin did not show any tumor. The lateral margin, the inferior margin as well as the deep margin did not show any malignancy. There was skeletal muscle present at the deep margin, but did not have any malignancy. The tumor size was 1.5 cm . It was considered as invasive ductal carcinoma metaplastic-type adenocarcinoma with spindle cell metaplasia. It had a Geraldine score of 7 out of 9 with mitotic score of 1, nuclear pleomorphism of 3 and tubular differentiation of 3. It was a grade 2 t umor. There was a single focus of invasive  carcinoma. Ductal carcinoma in situ was not present. There was some atypical ductal hyperplasia. Margins were uninvolved with invasive carcinoma. Distance from the closest margins was 4 mm. Deep margin on s p ecimen #3. Additional deep margin tissue on specimen #7 is negative as well, including skeletal muscle. The invasive carcinoma is 5 mm from the superior margin adjacent to the skin. It was a T2hH2V3 invasive carcinoma with metaplastic features with anna nocarcinoma with spindle cell features. ER is 10%, TX is 0%, HER2/corey is 0% and Ki-67 is 56%. Patient has been referred here for further options of treatment.      The patient was felt to have higher risk disease due to the metaplastic spindle cell nature of her tumor despite its size and negative nodes. It was recommended she proceed with adjuvant chemotherapy using dose dense Adriamycin/Cytoxan chemotherapy q. 2 weeks with Neulasta support x four cycles, followed by weekly Taxol x12 doses. 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 We will consider holding further Taxol if she develops any significant symptoms.      The patient underwent Mediport placement 02/27/13. [facility] echocardiogram 03/07/13 showed an ejection fraction of 61%. The patient was found to have thrombocytopenia with a platelet count initially of 136,000; on followup 03/08/13 it was 108,000. White count and hemoglobin were normal. IPF was significa n tly elevated at 24.7% suggestive of underlying diagnosis of ITP. We will check a B12, folate, ROLA panel. We will proceed on with treatment as planned. There was no splenomegaly on exam. We will follow her platelet count closely through treatment. If indeed she has ITP there may be some improvement with chemotherapy and steroids.    The patient was seen on 6/17/13 with worsening neutropenia related to Taxol. The decision was made to add Neupogen x2 days with  each weekly Taxol dose to hopefully continue on schedule without delay.    Patient has no worsening of peripheral neuropathy when she was evaluated on 7/8/13. We will continue to finish Taxol weekly for three more doses. Patient was instructed to increase her vitamin B12 to 1000 mcg and vitamin B6 at 50 mg.    Patient was on tamoxifen initially but had severe hot flashes.  Hormone status was checked and she was postmenopausal as a result we tried to switch her to Arimidex but because of osteopenia patient refused.  We offered Fosamax orally or prolia subcutaneous and she did not want that.    Given her ER was only 10% and MO negative we could consider continuing tamoxifen or offer Evista for chemotherapy prophylaxis and she chose to go on Evista.  She is tolerating Evista very well.    Mammography 07/10/2014 shows focal asymmetry in the right breast which is benign and stable calcifications in the left breast which is benign. One year followup recommended.    Mammogram done 07/13/2015 is negative.    Mammogram July 2016 is negative.  Mammogram screening August 27 2018- negative    Patient is on Evista.    Right breast ultrasound, 10:30 position, 6 cm from the nipple, 1.3 cm lesion on ultrasound but biopsy of that showed fat necrosis, stromal fibrosis and chronic inflammation.  Done at [facility].    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 She is a  at [employer]. She drinks 5-7 glasses of wine per week. She does not smoke. She drinks occasional bourbon but doesn’ t drink any whiskey. No risk factors for HIV. No tattoos, no previous blood transfusions.      FAMILY HISTORY: Father had history of prostate cancer at age 73, but he is 85 in good health. Mother is 82 and in fair health. She has a brother who is 48 years old and two sisters ages 53 and 51, all of them in good health. There is no family histor y of breast cancer. There is no history of cancer in  extended family as well.      Current Outpatient Medications on File Prior to Visit   Medication Sig Dispense Refill   • Ascorbic Acid (VITAMIN C ER) 1000 MG tablet controlled-release Take  by mouth.     • B Complex Vitamins (B COMPLEX PO) Take  by mouth.     • Cholecalciferol (VITAMIN D) 1000 UNITS tablet Take 1,000 Units by mouth.     • estradiol (VAGIFEM) 10 MCG tablet vaginal tablet 1 (One) Time Per Week.     • Fluticasone Propionate (FLONASE ALLERGY RELIEF NA) into each nostril.     • levocetirizine (XYZAL) 5 MG tablet Take 5 mg by mouth 2 (Two) Times a Day.     • Multiple Vitamins-Minerals (MULTIVITAMIN ADULT PO) Take  by mouth.     • pseudoephedrine (SUDAFED) 30 MG tablet Take 30 mg by mouth as needed for congestion.     • raloxifene (EVISTA) 60 MG tablet TAKE 1 TABLET DAILY 90 tablet 1     No current facility-administered medications on file prior to visit.        ALLERGIES:     Allergies   Allergen Reactions   • Latex Rash       Social History     Socioeconomic History   • Marital status:      Spouse name: Drew   • Number of children: Not on file   • Years of education: College   • Highest education level: Not on file   Occupational History   • Occupation:      Employer: [employer]   Tobacco Use   • Smoking status: Never Smoker   • Smokeless tobacco: Never Used   Substance and Sexual Activity   • Alcohol use: Yes     Alcohol/week: 1.2 - 1.8 oz     Types: 1 Cans of beer, 1 - 2 Shots of liquor per week     Comment: 5-7 GLASSES OF WINE PER WEEK, DRINKS [place]   • Drug use: No   • Sexual activity: Yes     Partners: Male     Birth control/protection: Post-menopausal, None     Comment: little or no intercourse last 5 yrs., PT (dilators) 2017   Social History Narrative    Lives at home with          Cancer-related family history includes Breast cancer in her maternal aunt; Cancer in her father, maternal aunt, and maternal aunt; Prostate cancer in her  "father.     Review of Systems   Constitutional: Negative for appetite change, chills, diaphoresis, fatigue, fever and unexpected weight change.   HENT: Negative for hearing loss, sore throat and trouble swallowing.    Respiratory: Negative for cough, chest tightness, shortness of breath and wheezing.    Cardiovascular: Negative for chest pain, palpitations and leg swelling.   Gastrointestinal: Negative for abdominal distention, abdominal pain, constipation, diarrhea, nausea and vomiting.   Genitourinary: Negative for dysuria, frequency, hematuria and urgency.   Musculoskeletal: Negative for joint swelling.        No muscle weakness.   Skin: Negative for rash and wound.   Neurological: Negative for seizures, syncope, speech difficulty, weakness, numbness and headaches.   Hematological: Negative for adenopathy. Does not bruise/bleed easily.   Psychiatric/Behavioral: Negative for behavioral problems, confusion and suicidal ideas.     Patient does complain of vaginal dryness.    Objective      Vitals:    09/24/19 0752   Height: 156.2 cm (61.5\")     Current Status 8/23/2019   ECOG score 0       Physical Exam        GENERAL:  Well-developed, well-nourished in no acute distress.   NECK:  Supple with good range of motion; no thyromegaly or masses, no JVD.  LYMPHATICS:  No cervical, supraclavicular, axillary or inguinal adenopathy.  CHEST:  Lungs clear to auscultation. Good airflow.  CARDIAC:  Regular rate and rhythm without murmurs, rubs or gallops. Normal S1,S2.  ABDOMEN:  Soft, nontender with no hepatosplenomegaly or masses.  EXTREMITIES:  No clubbing, cyanosis or edema.  NEUROLOGICAL:  Cranial Nerves II-XII grossly intact.  No focal neurological deficits.  PSYCHIATRIC:  Normal affect and mood.    y intact.  No focal neurological deficits.  PSYCHIATRIC:  Normal affect and mood.            RECENT LABS:  Hematology WBC   Date Value Ref Range Status   08/23/2019 5.84 3.40 - 10.80 10*3/mm3 Final   08/20/2018 5.17 4.50 - 10.70 " 10*3/mm3 Final     RBC   Date Value Ref Range Status   08/23/2019 3.84 3.77 - 5.28 10*6/mm3 Final   08/20/2018 4.21 3.90 - 5.20 10*6/mm3 Final     Hemoglobin   Date Value Ref Range Status   08/23/2019 12.4 12.0 - 15.9 g/dL Final     Hematocrit   Date Value Ref Range Status   08/23/2019 39.3 34.0 - 46.6 % Final     Platelets   Date Value Ref Range Status   08/23/2019 263 140 - 450 10*3/mm3 Final      Tissue Pathology Rt Breast 08/28/19  Final Diagnosis   1.  Right Breast at 10:30, 9-10 CMFN, Core Biopsy:                A.  Dense stromal fibrosis, organizing fat necrosis and focal chronic nonspecific inflammation.                B.  Scattered microcalcification noted within dense stromal tissue.               C.  No in situ nor infiltrating carcinoma identified by routine and/or IHC staining (see comment).          Mammo 08/21/19    IMPRESSION:  Solid mass in the right breast is suspicious. Differential considerations include fat necrosis. However US guided biopsy is recommended to exclude recurrent malignancy. Ultrasound guided vacuum assisted biopsy is recommended. Ekaterina at Dr. Kline's office was verbally notified 08/21/19 at 10:51 am regarding the above recommendations. This report will be faxed today 08/21/19. The notification letter was mailed to the patient. BI-RADS Category 4B: Suspicious Abnormality.    Assessment/Plan   · 1. This is a patient with history of stage I breast cancer, currently on tamoxifen.  She is received 3 years of tamoxifen.  She does have osteopenia.  Given that her ER 0% [place] 0%, HER 2 negative  intention is to give with chemoprophylaxis for a new breast primary we discussed about switching to Evista which helps with both osteopenia and also helps in chemoprevention.      · Patient is on Evista.    2.  Density in the right breast on screening mammogram July 2019 which appears larger.  This is in the area of the postsurgical scar.  · Right diagnostic mammogram and ultrasound shows 11 x  7 x 13 mm density which is suspicious.    · Patient will need ultrasound-guided biopsy of the right breast mass  · Ultrasound-guided biopsy was done August 28, 2019 and pathology is consistent with fat necrosis.    3. Peripheral neuropathy. The patient is to continue taking B12 and B6 with improvement in neuropathy symptoms.    4.  Severe vaginal dryness for which she needs to use Vagifem intermittently.  She understands that there can be some systemic absorption which could be responsible for breast cancer.  However given significant vaginal dryness and she wants to continue using Vagifem intermittently.  · She will discuss with her gynecologist.  · Patient understands there may be slight systemic absorption of local Vagifem as well but if the symptoms are severe of vaginal dryness then she may need to consider intermittent Vagifem.  · Patient is currently taking Vagifem once weekly. She is currently undergoing dilation therapy which somewhat improves her symptoms.     5. Osteopenia: DEXA performed on 03/29/2019.     6.  Lifestyle modification: Patient reports she does not exercise regularly. I advised her to consider exercising 40 minutes daily 5 days a week.     7. 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    PLAN:   1. Continue Evista.   2. Reviewed Dr. Arita's note  3. Reviewed pathology on the right breast, it is consistent with fat necrosis  4. Follow-up with me in 1 year with labs as she is 6 years out and she sees her primary care physician Dr. Mike Goldsmith at 6 months and prefers to come here in a year.     Jamia Bishop MD        Cc:   Dr. Faiza goldsmith

## 2019-10-01 RX ORDER — RALOXIFENE HYDROCHLORIDE 60 MG/1
TABLET, FILM COATED ORAL
Qty: 90 TABLET | Refills: 4 | Status: SHIPPED | OUTPATIENT
Start: 2019-10-01 | End: 2020-10-02

## 2020-07-31 ENCOUNTER — APPOINTMENT (OUTPATIENT)
Dept: WOMENS IMAGING | Facility: HOSPITAL | Age: 65
End: 2020-07-31

## 2020-07-31 PROCEDURE — 77067 SCR MAMMO BI INCL CAD: CPT | Performed by: RADIOLOGY

## 2020-07-31 PROCEDURE — 77063 BREAST TOMOSYNTHESIS BI: CPT | Performed by: RADIOLOGY

## 2020-09-22 ENCOUNTER — OFFICE VISIT (OUTPATIENT)
Dept: ONCOLOGY | Facility: CLINIC | Age: 65
End: 2020-09-22

## 2020-09-22 ENCOUNTER — LAB (OUTPATIENT)
Dept: LAB | Facility: HOSPITAL | Age: 65
End: 2020-09-22

## 2020-09-22 VITALS
RESPIRATION RATE: 16 BRPM | BODY MASS INDEX: 23.77 KG/M2 | DIASTOLIC BLOOD PRESSURE: 71 MMHG | HEIGHT: 61 IN | TEMPERATURE: 96.9 F | WEIGHT: 125.9 LBS | OXYGEN SATURATION: 100 % | HEART RATE: 85 BPM | SYSTOLIC BLOOD PRESSURE: 116 MMHG

## 2020-09-22 DIAGNOSIS — C50.011 MALIGNANT NEOPLASM OF AREOLA OF RIGHT BREAST IN FEMALE, UNSPECIFIED ESTROGEN RECEPTOR STATUS (HCC): ICD-10-CM

## 2020-09-22 DIAGNOSIS — C50.011 MALIGNANT NEOPLASM OF AREOLA OF RIGHT BREAST IN FEMALE, UNSPECIFIED ESTROGEN RECEPTOR STATUS (HCC): Primary | ICD-10-CM

## 2020-09-22 DIAGNOSIS — M79.89 FAT NECROSIS: ICD-10-CM

## 2020-09-22 LAB
ALBUMIN SERPL-MCNC: 4.3 G/DL (ref 3.5–5.2)
ALBUMIN/GLOB SERPL: 1.4 G/DL (ref 1.1–2.4)
ALP SERPL-CCNC: 65 U/L (ref 38–116)
ALT SERPL W P-5'-P-CCNC: 15 U/L (ref 0–33)
ANION GAP SERPL CALCULATED.3IONS-SCNC: 10.6 MMOL/L (ref 5–15)
AST SERPL-CCNC: 25 U/L (ref 0–32)
BASOPHILS # BLD AUTO: 0.05 10*3/MM3 (ref 0–0.2)
BASOPHILS NFR BLD AUTO: 1 % (ref 0–1.5)
BILIRUB SERPL-MCNC: 0.3 MG/DL (ref 0.2–1.2)
BUN SERPL-MCNC: 18 MG/DL (ref 6–20)
BUN/CREAT SERPL: 19.6 (ref 7.3–30)
CALCIUM SPEC-SCNC: 9.4 MG/DL (ref 8.5–10.2)
CHLORIDE SERPL-SCNC: 106 MMOL/L (ref 98–107)
CO2 SERPL-SCNC: 25.4 MMOL/L (ref 22–29)
CREAT SERPL-MCNC: 0.92 MG/DL (ref 0.6–1.1)
DEPRECATED RDW RBC AUTO: 45.9 FL (ref 37–54)
EOSINOPHIL # BLD AUTO: 0.15 10*3/MM3 (ref 0–0.4)
EOSINOPHIL NFR BLD AUTO: 2.9 % (ref 0.3–6.2)
ERYTHROCYTE [DISTWIDTH] IN BLOOD BY AUTOMATED COUNT: 13 % (ref 12.3–15.4)
GFR SERPL CREATININE-BSD FRML MDRD: 62 ML/MIN/1.73
GLOBULIN UR ELPH-MCNC: 3 GM/DL (ref 1.8–3.5)
GLUCOSE SERPL-MCNC: 77 MG/DL (ref 74–124)
HCT VFR BLD AUTO: 38.7 % (ref 34–46.6)
HGB BLD-MCNC: 12.2 G/DL (ref 12–15.9)
IMM GRANULOCYTES # BLD AUTO: 0.01 10*3/MM3 (ref 0–0.05)
IMM GRANULOCYTES NFR BLD AUTO: 0.2 % (ref 0–0.5)
LYMPHOCYTES # BLD AUTO: 1.61 10*3/MM3 (ref 0.7–3.1)
LYMPHOCYTES NFR BLD AUTO: 31.1 % (ref 19.6–45.3)
MCH RBC QN AUTO: 30.8 PG (ref 26.6–33)
MCHC RBC AUTO-ENTMCNC: 31.5 G/DL (ref 31.5–35.7)
MCV RBC AUTO: 97.7 FL (ref 79–97)
MONOCYTES # BLD AUTO: 0.4 10*3/MM3 (ref 0.1–0.9)
MONOCYTES NFR BLD AUTO: 7.7 % (ref 5–12)
NEUTROPHILS NFR BLD AUTO: 2.96 10*3/MM3 (ref 1.7–7)
NEUTROPHILS NFR BLD AUTO: 57.1 % (ref 42.7–76)
NRBC BLD AUTO-RTO: 0 /100 WBC (ref 0–0.2)
PLATELET # BLD AUTO: 222 10*3/MM3 (ref 140–450)
PMV BLD AUTO: 10.5 FL (ref 6–12)
POTASSIUM SERPL-SCNC: 3.9 MMOL/L (ref 3.5–4.7)
PROT SERPL-MCNC: 7.3 G/DL (ref 6.3–8)
RBC # BLD AUTO: 3.96 10*6/MM3 (ref 3.77–5.28)
SODIUM SERPL-SCNC: 142 MMOL/L (ref 134–145)
WBC # BLD AUTO: 5.18 10*3/MM3 (ref 3.4–10.8)

## 2020-09-22 PROCEDURE — 36415 COLL VENOUS BLD VENIPUNCTURE: CPT

## 2020-09-22 PROCEDURE — 99213 OFFICE O/P EST LOW 20 MIN: CPT | Performed by: INTERNAL MEDICINE

## 2020-09-22 PROCEDURE — 85025 COMPLETE CBC W/AUTO DIFF WBC: CPT

## 2020-09-22 PROCEDURE — 80053 COMPREHEN METABOLIC PANEL: CPT

## 2020-09-22 NOTE — PROGRESS NOTES
Subjective .     REASONS FOR FOLLOWUP:   1. 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 Status post lumpectomy, sentinel lymph node biopsy 01/30 /2013.    2. Status post Mediport placement 02/27/2013 by Dr. Arita.    3. Baseline echocardiogram 03/07/2013 with normal ejection fraction of 61%.    4. Patient started on adjuvant chemotherapy with Adriamycin and Cytoxan on 3/11/2013 for 4 cycles, followed by weekly Taxol.    5. 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    6. Peripheral neuropathy related to weekly Taxol.   7. Mild neutropenia related to chemotherapy, required Neupogen for marrow support.    8. Moderate thrombocytopenia detected on 11/21/2014. The patient is here for further evaluation.   9. Patient switched from tamoxifen after 3 years to Evista as she could not tolerate tamoxifen and refused Arimidex.  10. 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       HISTORY OF PRESENT ILLNESS:  The patient is a 64 y.o. year old female who is here for follow-up with the above-mentioned history.    History of Present Illness     The patient is a 60-year-old female with the above history, currently here for follow-up.    Patient had screening mammogram July 2020.  There is a high density oval mass with dystrophic calcifications, skin thickening and postsurgical scar in the posterior one third upper outer quadrant of the right breast.  This density appears increased compared to the prior exam.  There is also stable oval mass with calcifications at 1:30 position in the left breast.  This is thought to be a biopsy-proven fibroadenoma.    August 15, 2019 right diagnostic mammogram showed dense breasts and the density shows oval mass 7 mm with dystrophic  calcification in the posterior one third 1030 region of the right breast.    Ultrasound shows a solid mass which is 11 x 7 x 13 mm which is thought to be suspicious.  Biopsy is recommended.    Patient states that on exam she does not feel the mass.    Patient underwent ultrasound-guided right breast biopsy with placement of metallic clips.  The pathology returned benign.  Pathology showed dense stromal fibrosis with organizing fat necrosis and focal chronic nonspecific inflammation.  There was no evidence of any in situ or infiltrating carcinoma identified.  Patient was seen by Dr. Arita on September 3, 2019.  She is to follow-up with routine mammograms.  She is currently 6 years out and on Evista and tolerating well.    Interval history:  Patient had repeat mammogram 2020 which is negative.  She has no complaints except chronic back pain for which she underwent MRI from her chiropractor.  She says she has more sciatic pain rather than back pain.  Her pain is improved now.  We discussed about referral to neurosurgery but currently she refuses.  She does not have any weakness in the lower extremities or tingling in the lower extremities.    She is to call us if her pain gets worse.    PAST MEDICAL HISTORY: Significant for laparoscopic surgery at [place] for [place] rine polyps, ovarian cyst and endometriosis. She had ingrown toenails x2 and has had surgery on the toenails by Dr. Robbin Garcia. She has had D and C in the past for irregular bleeding. Inguinal hernia surgery on the right in .      OB/GYN HISTORY: Menarche age 15. She is postmenopausal; last LMP 2012.  0.      Past Medical History:   Diagnosis Date   • Allergic 1965    seasonal, lifelong   • Depression 1998    several months only   • Drug therapy 2013   • Endometriosis    • Hot flashes due to tamoxifen    • Hx of radiation therapy 2013    Right breast   • Inguinal hernia    • Invasive ductal carcinoma of breast  (CMS/HCC)     RIGHT WITH METAPLASTIC FEATURES (SPINDLE CELL METAPLASIA) 1.5CM GRADE 2, ER WEAKLY POSITIVE, MI NEGATIVE, HER-2/SOLITARIO NEGATIVE   • Low back pain 1/1991    usually mild, chiropractic maintenance   • Ovarian cyst    • Peripheral neuropathy     RELATED TO TAXOL   • Pneumonia 1965 & 1978   • Postmenopausal    • STD (sexually transmitted disease)     HPV genital warts   • Uterine polyp        ONCOLOGIC HISTORY:  (History from previous dates can be found in the separate document.)  ONCOLOGIC HISTORY: The patient has had multiple cysts in both breasts requiring aspiration in the past and path has always been negative for malignancy back in March of 2006 and in June of 2010 subsequently she underwent aspiration even in June of 2011, w hich all were suggestive of fibroid adenoma. She has fibrocystic disease. More recently she underwent a mammogram on 06/28/12 - at that time she had a digital diagnostic mammogram. She had calcifications in both breasts which were thought to be benign and negative. She had a stable complicated cyst in the right breast at 12 o' clock position which was thought to be benign. Followup ultrasound of the right breast was recommended in six months. The complicated cyst in the right breast probably benign; however there was an inframammary lymph node in the right breast at 9 o 'clock position which was also thought to be benign. Hence a six month followup was suggested with ultrasound.     Subsequently on 01/04/13 she underwent right breast ultrasound, which showed that there was a stable round complicated cyst with thin margins measuring 3 mm from the right breast at 12 o' clock position. There were multiple stable oval elongated complicated cysts in the right breast with thin margins, which were thought to be maciel ign, but there was a new microlobulated taller-than-wide solid mass with partially defined margins, which was 1.4 cm in the right breast. Because of this new solid mass in the  right breast, which was suspicious, ultrasound guided vacuum-assisted biopsy w as recommended. The biopsy was done on 01/08/13 - the right breast ten-thirty position core biopsy showed invasive mammary carcinoma, metaplastic-type, subtype adenocarcinoma with spindle cell metaplasia. It was a grade 2 tumor with a tubular differentiat ion score of 3, high nuclear grade score of 3 and mitotic figure score of 1. There was one mitotic figure per 10 HPF. There was no DCIS identified. The Accession # on this was [id number]. Estrogen receptor was 10%, progesterone receptor 0%, HER2/corey w as 0% and Ki-67 as elevated at 56%. She underwent a chest x-ray on 01/16/13 which showed that there were no suspicious pulmonary opacities seen. There was slight scarring on both lung apices. MRI of the breast was done on 01/21/13 which showed 1.5 cm i r regular enhancing mass within the axillary tale region of the right breast representing the biopsy proven site of malignancy. No evidence of axillary adenopathy was appreciated. There were no findings suspicious for malignancy in the left breast. She d i d have some scattered foci of variable enhancement thought to be secondary to moderate fibrocystic breast changes. Subsequently patient was referred to Dr. Arita, who did a right breast ultrasound-guided lumpectomy with a right axillary sentinel lymph node dissection with lymphoscintigraphic guidance on 01/30/13. The pathology report (Accession # [id number]) showed sentinel lymph node #1 in the right axilla had 2 nodes, a smaller node which did not show any malignancy and the larger node also did not sh o w malignancy. The sentinel lymph node #2 in the right axilla did not show any malignancy. The right breast lumpectomy specimen did show invasive ductal carcinoma with metaplastic features. The margins of resection were negative. The right breast additi o nal superior margin did not show any tumor. The lateral margin, the inferior margin  as well as the deep margin did not show any malignancy. There was skeletal muscle present at the deep margin, but did not have any malignancy. The tumor size was 1.5 cm . It was considered as invasive ductal carcinoma metaplastic-type adenocarcinoma with spindle cell metaplasia. It had a Roberto score of 7 out of 9 with mitotic score of 1, nuclear pleomorphism of 3 and tubular differentiation of 3. It was a grade 2 t umor. There was a single focus of invasive carcinoma. Ductal carcinoma in situ was not present. There was some atypical ductal hyperplasia. Margins were uninvolved with invasive carcinoma. Distance from the closest margins was 4 mm. Deep margin on s p ecimen #3. Additional deep margin tissue on specimen #7 is negative as well, including skeletal muscle. The invasive carcinoma is 5 mm from the superior margin adjacent to the skin. It was a [id number] invasive carcinoma with metaplastic features with anna nocarcinoma with spindle cell features. ER is 10%, UT is 0%, HER2/corey is 0% and Ki-67 is 56%. Patient has been referred here for further options of treatment.      The patient was felt to have higher risk disease due to the metaplastic spindle cell nature of her tumor despite its size and negative nodes. It was recommended she proceed with adjuvant chemotherapy using dose dense Adriamycin/Cytoxan chemotherapy q. 2 weeks with Neulasta support x four cycles, followed by weekly Taxol x12 doses. 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 We will consider holding further Taxol if she develops any significant symptoms.      The patient underwent Mediport placement 02/27/13. [facility] echocardiogram 03/07/13 showed an ejection fraction of 61%. The patient was found to have thrombocytopenia with a platelet count initially of 136,000; on followup 03/08/13 it was 108,000. White count and hemoglobin were normal. IPF was significa  n tly elevated at 24.7% suggestive of underlying diagnosis of ITP. We will check a B12, folate, ORLA panel. We will proceed on with treatment as planned. There was no splenomegaly on exam. We will follow her platelet count closely through treatment. If indeed she has ITP there may be some improvement with chemotherapy and steroids.    The patient was seen on 6/17/13 with worsening neutropenia related to Taxol. The decision was made to add Neupogen x2 days with each weekly Taxol dose to hopefully continue on schedule without delay.    Patient has no worsening of peripheral neuropathy when she was evaluated on 7/8/13. We will continue to finish Taxol weekly for three more doses. Patient was instructed to increase her vitamin B12 to 1000 mcg and vitamin B6 at 50 mg.    Patient was on tamoxifen initially but had severe hot flashes.  Hormone status was checked and she was postmenopausal as a result we tried to switch her to Arimidex but because of osteopenia patient refused.  We offered Fosamax orally or prolia subcutaneous and she did not want that.    Given her ER was only 10% and VT negative we could consider continuing tamoxifen or offer Evista for chemotherapy prophylaxis and she chose to go on Evista.  She is tolerating Evista very well.    Mammography 07/10/2014 shows focal asymmetry in the right breast which is benign and stable calcifications in the left breast which is benign. One year followup recommended.    Mammogram done 07/13/2015 is negative.    Mammogram July 2016 is negative.  Mammogram screening August 27 2018- negative    Patient is on Evista.    Right breast ultrasound, 10:30 position, 6 cm from the nipple, 1.3 cm lesion on ultrasound but biopsy of that showed fat necrosis, stromal fibrosis and chronic inflammation.  Done at [facility], [place].    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 She is a  at [employer]. She drinks 5-7 glasses of wine per  week. She does not smoke. She drinks occasional bourbon but doesn’ t drink any whiskey. No risk factors for HIV. No tattoos, no previous blood transfusions.      FAMILY HISTORY: Father had history of prostate cancer at age 73, but he is 85 in good health. Mother is 82 and in fair health. She has a brother who is 48 years old and two sisters ages 53 and 51, all of them in good health. There is no family histor y of breast cancer. There is no history of cancer in extended family as well.      Current Outpatient Medications on File Prior to Visit   Medication Sig Dispense Refill   • Ascorbic Acid (VITAMIN C ER) 1000 MG tablet controlled-release Take  by mouth.     • B Complex Vitamins (B COMPLEX PO) Take  by mouth.     • Cholecalciferol (VITAMIN D) 1000 UNITS tablet Take 1,000 Units by mouth.     • Fluticasone Propionate (FLONASE ALLERGY RELIEF NA) into each nostril.     • levocetirizine (XYZAL) 5 MG tablet Take 5 mg by mouth 2 (Two) Times a Day.     • Multiple Vitamins-Minerals (MULTIVITAMIN ADULT PO) Take  by mouth.     • pseudoephedrine (SUDAFED) 120 MG 12 hr tablet Take 120 mg by mouth Every 12 (Twelve) Hours.     • raloxifene (EVISTA) 60 MG tablet TAKE 1 TABLET DAILY 90 tablet 4   • estradiol (VAGIFEM) 10 MCG tablet vaginal tablet 1 (One) Time Per Week.       No current facility-administered medications on file prior to visit.        ALLERGIES:     Allergies   Allergen Reactions   • Latex Rash       Social History     Socioeconomic History   • Marital status:      Spouse name: Drew   • Number of children: Not on file   • Years of education: College   • Highest education level: Not on file   Occupational History   • Occupation:      Employer: [employer]   Tobacco Use   • Smoking status: Never Smoker   • Smokeless tobacco: Never Used   Substance and Sexual Activity   • Alcohol use: Yes     Alcohol/week: 2.0 - 3.0 standard drinks     Types: 1 Cans of beer, 1 - 2  "Shots of liquor per week     Comment: 5-7 GLASSES OF WINE PER WEEK, DRINKS [place]   • Drug use: No   • Sexual activity: Yes     Partners: Male     Birth control/protection: Post-menopausal, None     Comment: little or no intercourse last 5 yrs., PT (dilators) 2017   Social History Narrative    Lives at home with          Cancer-related family history includes Breast cancer in her maternal aunt; Cancer in her father, maternal aunt, and maternal aunt; Prostate cancer in her father.     Review of Systems   Constitutional: Negative for appetite change, chills, diaphoresis, fatigue, fever and unexpected weight change.   HENT: Negative for hearing loss, sore throat and trouble swallowing.    Respiratory: Negative for cough, chest tightness, shortness of breath and wheezing.    Cardiovascular: Negative for chest pain, palpitations and leg swelling.   Gastrointestinal: Negative for abdominal distention, abdominal pain, constipation, diarrhea, nausea and vomiting.   Genitourinary: Negative for dysuria, frequency, hematuria and urgency.   Musculoskeletal: Negative for joint swelling.        No muscle weakness.   Skin: Negative for rash and wound.   Neurological: Negative for seizures, syncope, speech difficulty, weakness, numbness and headaches.   Hematological: Negative for adenopathy. Does not bruise/bleed easily.   Psychiatric/Behavioral: Negative for behavioral problems, confusion and suicidal ideas.   All other systems reviewed and are negative.    Patient does complain of vaginal dryness.  .joyce    Objective      Vitals:    09/22/20 1005   BP: 116/71   Pulse: 85   Resp: 16   Temp: 96.9 °F (36.1 °C)   TempSrc: Skin   SpO2: 100%   Weight: 57.1 kg (125 lb 14.4 oz)   Height: 156.2 cm (61.5\")   PainSc:   8   PainLoc: Back  Comment: Low back down left leg.     Current Status 9/22/2020   ECOG score 0       Physical Exam        GENERAL:  Well-developed, well-nourished in no acute distress.   NECK:  Supple with good " range of motion; no thyromegaly or masses, no JVD.  LYMPHATICS:  No cervical, supraclavicular, axillary or inguinal adenopathy.  CHEST:  Lungs clear to auscultation. Good airflow.  BREAST: Right breast: No skin changes, no evidence of breast mass, no nipple discharge, no evidence of any right axillary adenopathy or right supraclavicular adenopathy  Left breast: No evidence of any skin changes, no evidence of any left breast mass and no evidence of left nipple discharge as well as no left axillary adenopathy or left supraclavicular adenopathy.  CARDIAC:  Regular rate and rhythm without murmurs, rubs or gallops. Normal S1,S2.  ABDOMEN:  Soft, nontender with no hepatosplenomegaly or masses.  EXTREMITIES:  No clubbing, cyanosis or edema.  NEUROLOGICAL:  Cranial Nerves II-XII grossly intact.  No focal neurological deficits.  PSYCHIATRIC:  Normal affect and mood.    y intact.  No focal neurological deficits.  PSYCHIATRIC:  Normal affect and mood.      I have reexamined the patient and the results are consistent with the previously documented exam. Jamia Bishop MD           RECENT LABS:  Hematology WBC   Date Value Ref Range Status   09/22/2020 5.18 3.40 - 10.80 10*3/mm3 Final   08/20/2018 5.17 4.50 - 10.70 10*3/mm3 Final     RBC   Date Value Ref Range Status   09/22/2020 3.96 3.77 - 5.28 10*6/mm3 Final   08/20/2018 4.21 3.90 - 5.20 10*6/mm3 Final     Hemoglobin   Date Value Ref Range Status   09/22/2020 12.2 12.0 - 15.9 g/dL Final     Hematocrit   Date Value Ref Range Status   09/22/2020 38.7 34.0 - 46.6 % Final     Platelets   Date Value Ref Range Status   09/22/2020 222 140 - 450 10*3/mm3 Final      Tissue Pathology Rt Breast 08/28/19  Final Diagnosis   1.  Right Breast at 10:30, 9-10 CMFN, Core Biopsy:                A.  Dense stromal fibrosis, organizing fat necrosis and focal chronic nonspecific inflammation.                B.  Scattered microcalcification noted within dense stromal tissue.               C.  No in  situ nor infiltrating carcinoma identified by routine and/or IHC staining (see comment).          Mammo 08/21/19    IMPRESSION:  Solid mass in the right breast is suspicious. Differential considerations include fat necrosis. However US guided biopsy is recommended to exclude recurrent malignancy. Ultrasound guided vacuum assisted biopsy is recommended. Ekaterina at Dr. Kline's office was verbally notified 08/21/19 at 10:51 am regarding the above recommendations. This report will be faxed today 08/21/19. The notification letter was mailed to the patient. BI-RADS Category 4B: Suspicious Abnormality.    Assessment/Plan   · 1. This is a patient with history of stage I breast cancer, currently on tamoxifen.  She is received 3 years of tamoxifen.  She does have osteopenia.  Given that her ER 0% VA 0%, HER 2 negative  intention is to give with chemoprophylaxis for a new breast primary we discussed about switching to Evista which helps with both osteopenia and also helps in chemoprevention.      · Patient is on Evista.    2.  Density in the right breast on screening mammogram July 2019 which appears larger.  This is in the area of the postsurgical scar.  · Right diagnostic mammogram and ultrasound shows 11 x 7 x 13 mm density which is suspicious.    · Patient will need ultrasound-guided biopsy of the right breast mass  · Ultrasound-guided biopsy was done August 28, 2019 and pathology is consistent with fat necrosis.    3. Peripheral neuropathy. The patient is to continue taking B12 and B6 with improvement in neuropathy symptoms.    4.  Severe vaginal dryness for which she needs to use Vagifem intermittently.  She understands that there can be some systemic absorption which could be responsible for breast cancer.  However given significant vaginal dryness and she wants to continue using Vagifem intermittently.  · She will discuss with her gynecologist.  · Patient understands there may be slight systemic absorption of local  Vagifem as well but if the symptoms are severe of vaginal dryness then she may need to consider intermittent Vagifem.  · Patient is currently taking Vagifem once weekly. She is currently undergoing dilation therapy which somewhat improves her symptoms.     5. Osteopenia: DEXA performed on 03/29/2019.     6.  Lifestyle modification: Patient reports she does not exercise regularly. I advised her to consider exercising 40 minutes daily 5 days a week.     7. 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    PLAN:   1. Continue Evista.   2. Obtain MRI of the spine done at [facility]  3. Patient to call us if her sciatic pain gets worse  4. Follow-up with me in 1 year with labs  5. Currently no evidence of disease    Jamia Bishop MD        Cc:   Dr. Faiza padilla

## 2020-10-02 RX ORDER — RALOXIFENE HYDROCHLORIDE 60 MG/1
TABLET, FILM COATED ORAL
Qty: 90 TABLET | Refills: 3 | Status: SHIPPED | OUTPATIENT
Start: 2020-10-02 | End: 2022-01-18

## 2020-10-29 ENCOUNTER — OFFICE VISIT (OUTPATIENT)
Dept: FAMILY MEDICINE CLINIC | Facility: CLINIC | Age: 65
End: 2020-10-29

## 2020-10-29 VITALS
SYSTOLIC BLOOD PRESSURE: 118 MMHG | TEMPERATURE: 97.8 F | OXYGEN SATURATION: 100 % | DIASTOLIC BLOOD PRESSURE: 74 MMHG | HEART RATE: 68 BPM | RESPIRATION RATE: 16 BRPM | BODY MASS INDEX: 22.63 KG/M2 | HEIGHT: 62 IN | WEIGHT: 123 LBS

## 2020-10-29 DIAGNOSIS — Z12.11 COLON CANCER SCREENING: ICD-10-CM

## 2020-10-29 DIAGNOSIS — Z23 NEED FOR VACCINATION: ICD-10-CM

## 2020-10-29 DIAGNOSIS — L30.9 ECZEMA, UNSPECIFIED TYPE: ICD-10-CM

## 2020-10-29 DIAGNOSIS — Z00.00 ROUTINE GENERAL MEDICAL EXAMINATION AT A HEALTH CARE FACILITY: Primary | ICD-10-CM

## 2020-10-29 PROCEDURE — 90471 IMMUNIZATION ADMIN: CPT | Performed by: FAMILY MEDICINE

## 2020-10-29 PROCEDURE — 99396 PREV VISIT EST AGE 40-64: CPT | Performed by: FAMILY MEDICINE

## 2020-10-29 PROCEDURE — 90750 HZV VACC RECOMBINANT IM: CPT | Performed by: FAMILY MEDICINE

## 2020-10-29 RX ORDER — BETAMETHASONE DIPROPIONATE 0.5 MG/G
CREAM TOPICAL 2 TIMES DAILY
Qty: 45 G | Refills: 2 | Status: SHIPPED | OUTPATIENT
Start: 2020-10-29

## 2020-10-29 NOTE — PATIENT INSTRUCTIONS
Annual Wellness  Personal Prevention Plan of Service   I will call you with lab results.  Date of Office Visit:  10/29/2020  Encounter Provider:  Mike Goldsmith MD  Place of Service:  [facility]  Patient Name: June Baca  :  1955    As part of the Annual  Wellness portion of your visit today, we are providing you with this personalized preventive plan of services (PPPS). This plan is based upon recommendations of the [place] Preventive Services Task Force (USPSTF) and the Advisory Committee on Immunization Practices (ACIP).    This lists the preventive care services that should be considered, and provides dates of when you are due. Items listed as completed are up-to-date and do not require any further intervention.    Health Maintenance   Topic Date Due   • COLONOSCOPY  2019   • INFLUENZA VACCINE  2020   • ZOSTER VACCINE (2 of 2) 2020   • DXA SCAN  2021   • MAMMOGRAM  2021   • ANNUAL PHYSICAL  10/30/2021   • PAP SMEAR  2024   • TDAP/TD VACCINES (2 - Td) 2026   • HEPATITIS C SCREENING  Addressed   • Pneumococcal Vaccine 0-64  Aged Out       Orders Placed This Encounter   Procedures   • Shingrix Vaccine   • CBC (No Diff)     Order Specific Question:   [facility] Has the patient fasted?     Answer:   Yes   • Comprehensive Metabolic Panel     Order Specific Question:   [facility] Has the patient fasted?     Answer:   Yes   • Lipid Panel With / Chol / HDL Ratio     Order Specific Question:   [facility] Has the patient fasted?     Answer:   Yes   • Ambulatory Referral to Gastroenterology     Referral Priority:   Routine     Referral Type:   Consultation     Referral Reason:   Specialty Services Required     Referred to Provider:   Neli Wild MD     Requested Specialty:   Gastroenterology     Number of Visits Requested:   1       Return in about 1 year (around 10/29/2021) for Annual physical.

## 2020-10-29 NOTE — PROGRESS NOTES
"Preventive Exam    History of Present Illness: June is here for check up and review of routine health maintenance. We also addressed the following concerns:  She has herniated disc with sciatica down the left. She has been followed and treated by a chiropractor and feels she is getting better. She is taking Tylenol and ibuprofen. This started in April and got worse in June, and she sought help from her chiropractor.  She has chronic eczema on her skin particular hands and feels like it is getting worse with all the hand  and handwashing.  She is requesting refills of a steroid cream.    Pap Smear:UTD  Mammogram:UTD  Colon cancer screening:ordered  Tobacco use :non smoker  Bone Density:UTD      REVIEW OF SYSTEMS  Constitutional: Negative.    HENT: Negative.    Eyes: Negative.    Respiratory: Negative.    Cardiovascular: Negative.    Gastrointestinal: Negative.    Endocrine: Negative.    Genitourinary: Negative.    Musculoskeletal: Negative.  Skin: Rash on hands.  Allergic/Immunologic: Negative.    Neurological: Negative.    Hematological: Negative.    Psychiatric/Behavioral: Negative.    I have reviewed the ROS as documented by the MA. Mike Goldsmith MD       PHYSICAL EXAM    Vitals:    10/29/20 1322   BP: 118/74   Pulse: 68   Resp: 16   Temp: 97.8 °F (36.6 °C)   SpO2: 100%   Weight: 55.8 kg (123 lb)   Height: 156.2 cm (61.5\")     GENERAL: alert and oriented, afebrile and vital signs stable  HEENT: oral mucosa moist, PEERLA, EOM, conjunctiva normal  No cervical adenopathy  LUNGS: clear to ascultation bilaterally, no rales, ronchi or wheezing  HEART: RRR S1 S2 without murmers, thrills, rubs or gallops  CHEST WALL: within normal limits, no tenderness  BREAST EXAM: No masses, skin changes, tenderness or discharge noted  ABDOMEN: WNL. Normal BS.  EXTREMITIES: No clubbing, cyanosis or edema noted. Normal Pulses.  SKIN: warm, dry, no rashes noted  NEURO: CN II- XII grossly intact    ASSESSMENT AND " PLAN  Problem List Items Addressed This Visit     None      Visit Diagnoses     Routine general medical examination at a health care facility    -  Primary    Relevant Orders    CBC (No Diff) (Completed)    Comprehensive Metabolic Panel (Completed)    Lipid Panel With / Chol / HDL Ratio (Completed)    Eczema, unspecified type        Relevant Medications    betamethasone dipropionate 0.05 % cream    Colon cancer screening        Relevant Orders    Ambulatory Referral to Gastroenterology    Need for vaccination        Relevant Orders    Shingrix Vaccine (Completed)        Routine health maintenance reviewed and discussed with June.      No follow-ups on file.  Answers for HPI/ROS submitted by the patient on 10/27/2020   What is the primary reason for your visit?: Physical

## 2020-10-30 LAB
ALBUMIN SERPL-MCNC: 4.6 G/DL (ref 3.5–5.2)
ALBUMIN/GLOB SERPL: 1.8 G/DL
ALP SERPL-CCNC: 72 U/L (ref 39–117)
ALT SERPL-CCNC: 18 U/L (ref 1–33)
AST SERPL-CCNC: 25 U/L (ref 1–32)
BILIRUB SERPL-MCNC: 0.3 MG/DL (ref 0–1.2)
BUN SERPL-MCNC: 18 MG/DL (ref 8–23)
BUN/CREAT SERPL: 21.4 (ref 7–25)
CALCIUM SERPL-MCNC: 9.7 MG/DL (ref 8.6–10.5)
CHLORIDE SERPL-SCNC: 103 MMOL/L (ref 98–107)
CHOLEST SERPL-MCNC: 236 MG/DL (ref 0–200)
CHOLEST/HDLC SERPL: 3.28 {RATIO}
CO2 SERPL-SCNC: 28.8 MMOL/L (ref 22–29)
CREAT SERPL-MCNC: 0.84 MG/DL (ref 0.57–1)
ERYTHROCYTE [DISTWIDTH] IN BLOOD BY AUTOMATED COUNT: 13.3 % (ref 12.3–15.4)
GLOBULIN SER CALC-MCNC: 2.6 GM/DL
GLUCOSE SERPL-MCNC: 79 MG/DL (ref 65–99)
HCT VFR BLD AUTO: 36.8 % (ref 34–46.6)
HDLC SERPL-MCNC: 72 MG/DL (ref 40–60)
HGB BLD-MCNC: 11.9 G/DL (ref 12–15.9)
LDLC SERPL CALC-MCNC: 147 MG/DL (ref 0–100)
MCH RBC QN AUTO: 31.6 PG (ref 26.6–33)
MCHC RBC AUTO-ENTMCNC: 32.3 G/DL (ref 31.5–35.7)
MCV RBC AUTO: 97.9 FL (ref 79–97)
PLATELET # BLD AUTO: 162 10*3/MM3 (ref 140–450)
POTASSIUM SERPL-SCNC: 4.3 MMOL/L (ref 3.5–5.2)
PROT SERPL-MCNC: 7.2 G/DL (ref 6–8.5)
RBC # BLD AUTO: 3.76 10*6/MM3 (ref 3.77–5.28)
SODIUM SERPL-SCNC: 142 MMOL/L (ref 136–145)
TRIGL SERPL-MCNC: 96 MG/DL (ref 0–150)
VLDLC SERPL CALC-MCNC: 17 MG/DL (ref 5–40)
WBC # BLD AUTO: 6.13 10*3/MM3 (ref 3.4–10.8)

## 2020-11-23 DIAGNOSIS — D50.9 IRON DEFICIENCY ANEMIA, UNSPECIFIED IRON DEFICIENCY ANEMIA TYPE: Primary | ICD-10-CM

## 2020-12-01 ENCOUNTER — TELEPHONE (OUTPATIENT)
Dept: GASTROENTEROLOGY | Facility: CLINIC | Age: 65
End: 2020-12-01

## 2020-12-01 LAB
ERYTHROCYTE [DISTWIDTH] IN BLOOD BY AUTOMATED COUNT: 13.1 % (ref 12.3–15.4)
HCT VFR BLD AUTO: 36.7 % (ref 34–46.6)
HGB BLD-MCNC: 12 G/DL (ref 12–15.9)
MCH RBC QN AUTO: 31.5 PG (ref 26.6–33)
MCHC RBC AUTO-ENTMCNC: 32.7 G/DL (ref 31.5–35.7)
MCV RBC AUTO: 96.3 FL (ref 79–97)
PLATELET # BLD AUTO: 172 10*3/MM3 (ref 140–450)
RBC # BLD AUTO: 3.81 10*6/MM3 (ref 3.77–5.28)
WBC # BLD AUTO: 4.79 10*3/MM3 (ref 3.4–10.8)

## 2020-12-02 ENCOUNTER — TELEPHONE (OUTPATIENT)
Dept: GASTROENTEROLOGY | Facility: CLINIC | Age: 65
End: 2020-12-02

## 2020-12-11 ENCOUNTER — TELEPHONE (OUTPATIENT)
Dept: GASTROENTEROLOGY | Facility: CLINIC | Age: 65
End: 2020-12-11

## 2020-12-11 ENCOUNTER — PREP FOR SURGERY (OUTPATIENT)
Dept: OTHER | Facility: HOSPITAL | Age: 65
End: 2020-12-11

## 2020-12-11 DIAGNOSIS — Z12.11 ENCOUNTER FOR SCREENING FOR MALIGNANT NEOPLASM OF COLON: Primary | ICD-10-CM

## 2020-12-11 NOTE — TELEPHONE ENCOUNTER
Last scope 2009 with Dr Toribio, no records available-- no knowledge of previous of polyps-- no family hx of colon ca-- no ASA or blood thinners-- medications:    raloxifene (EVISTA) 60 MG tablet  levocetirizine (XYZAL) 5 MG tablet  Fluticasone Propionate (FLONASE ALLERGY RELIEF NA)  Calcium/magnesium  Cholecalciferol (VITAMIN D) 1000 UNITS tablet  Ascorbic Acid (VITAMIN C ER) 1000 MG tablet controlled-release  Vitamin B   Vitamin E   Multivitamin     OA form scanned into media    Thank you

## 2021-01-14 PROBLEM — Z12.11 ENCOUNTER FOR SCREENING FOR MALIGNANT NEOPLASM OF COLON: Status: ACTIVE | Noted: 2021-01-14

## 2021-01-15 ENCOUNTER — CLINICAL SUPPORT (OUTPATIENT)
Dept: FAMILY MEDICINE CLINIC | Facility: CLINIC | Age: 66
End: 2021-01-15

## 2021-01-15 PROCEDURE — 90471 IMMUNIZATION ADMIN: CPT | Performed by: FAMILY MEDICINE

## 2021-01-15 PROCEDURE — 90750 HZV VACC RECOMBINANT IM: CPT | Performed by: FAMILY MEDICINE

## 2021-03-22 ENCOUNTER — BULK ORDERING (OUTPATIENT)
Dept: CASE MANAGEMENT | Facility: OTHER | Age: 66
End: 2021-03-22

## 2021-03-22 DIAGNOSIS — Z23 IMMUNIZATION DUE: ICD-10-CM

## 2021-03-26 ENCOUNTER — TRANSCRIBE ORDERS (OUTPATIENT)
Dept: SLEEP MEDICINE | Facility: HOSPITAL | Age: 66
End: 2021-03-26

## 2021-03-26 DIAGNOSIS — Z01.818 OTHER SPECIFIED PRE-OPERATIVE EXAMINATION: Primary | ICD-10-CM

## 2021-04-03 ENCOUNTER — LAB (OUTPATIENT)
Dept: LAB | Facility: HOSPITAL | Age: 66
End: 2021-04-03

## 2021-04-03 DIAGNOSIS — Z01.818 OTHER SPECIFIED PRE-OPERATIVE EXAMINATION: ICD-10-CM

## 2021-04-03 PROCEDURE — C9803 HOPD COVID-19 SPEC COLLECT: HCPCS

## 2021-04-03 PROCEDURE — U0004 COV-19 TEST NON-CDC HGH THRU: HCPCS

## 2021-04-05 LAB — SARS-COV-2 RNA RESP QL NAA+PROBE: NOT DETECTED

## 2021-04-06 ENCOUNTER — ANESTHESIA EVENT (OUTPATIENT)
Dept: GASTROENTEROLOGY | Facility: HOSPITAL | Age: 66
End: 2021-04-06

## 2021-04-06 ENCOUNTER — ANESTHESIA (OUTPATIENT)
Dept: GASTROENTEROLOGY | Facility: HOSPITAL | Age: 66
End: 2021-04-06

## 2021-04-06 ENCOUNTER — HOSPITAL ENCOUNTER (OUTPATIENT)
Facility: HOSPITAL | Age: 66
Setting detail: HOSPITAL OUTPATIENT SURGERY
Discharge: HOME | End: 2021-04-06
Attending: INTERNAL MEDICINE | Admitting: INTERNAL MEDICINE

## 2021-04-06 VITALS
HEIGHT: 61 IN | OXYGEN SATURATION: 100 % | WEIGHT: 125 LBS | DIASTOLIC BLOOD PRESSURE: 68 MMHG | RESPIRATION RATE: 17 BRPM | HEART RATE: 74 BPM | SYSTOLIC BLOOD PRESSURE: 115 MMHG | BODY MASS INDEX: 23.6 KG/M2

## 2021-04-06 DIAGNOSIS — Z12.11 ENCOUNTER FOR SCREENING FOR MALIGNANT NEOPLASM OF COLON: ICD-10-CM

## 2021-04-06 PROCEDURE — 45380 COLONOSCOPY AND BIOPSY: CPT | Performed by: INTERNAL MEDICINE

## 2021-04-06 PROCEDURE — S0260 H&P FOR SURGERY: HCPCS | Performed by: INTERNAL MEDICINE

## 2021-04-06 PROCEDURE — 25010000002 PROPOFOL 10 MG/ML EMULSION: Performed by: ANESTHESIOLOGY

## 2021-04-06 PROCEDURE — 88305 TISSUE EXAM BY PATHOLOGIST: CPT | Performed by: INTERNAL MEDICINE

## 2021-04-06 PROCEDURE — 45385 COLONOSCOPY W/LESION REMOVAL: CPT | Performed by: INTERNAL MEDICINE

## 2021-04-06 RX ORDER — SODIUM CHLORIDE, SODIUM LACTATE, POTASSIUM CHLORIDE, CALCIUM CHLORIDE 600; 310; 30; 20 MG/100ML; MG/100ML; MG/100ML; MG/100ML
INJECTION, SOLUTION INTRAVENOUS CONTINUOUS PRN
Status: DISCONTINUED | OUTPATIENT
Start: 2021-04-06 | End: 2021-04-06 | Stop reason: SURG

## 2021-04-06 RX ORDER — SODIUM CHLORIDE, SODIUM LACTATE, POTASSIUM CHLORIDE, CALCIUM CHLORIDE 600; 310; 30; 20 MG/100ML; MG/100ML; MG/100ML; MG/100ML
30 INJECTION, SOLUTION INTRAVENOUS CONTINUOUS PRN
Status: DISCONTINUED | OUTPATIENT
Start: 2021-04-06 | End: 2021-04-06 | Stop reason: HOSPADM

## 2021-04-06 RX ORDER — PROPOFOL 10 MG/ML
VIAL (ML) INTRAVENOUS CONTINUOUS PRN
Status: DISCONTINUED | OUTPATIENT
Start: 2021-04-06 | End: 2021-04-06 | Stop reason: SURG

## 2021-04-06 RX ORDER — LIDOCAINE HYDROCHLORIDE 20 MG/ML
INJECTION, SOLUTION INFILTRATION; PERINEURAL AS NEEDED
Status: DISCONTINUED | OUTPATIENT
Start: 2021-04-06 | End: 2021-04-06 | Stop reason: SURG

## 2021-04-06 RX ORDER — PROPOFOL 10 MG/ML
VIAL (ML) INTRAVENOUS AS NEEDED
Status: DISCONTINUED | OUTPATIENT
Start: 2021-04-06 | End: 2021-04-06 | Stop reason: SURG

## 2021-04-06 RX ADMIN — PROPOFOL 60 MG: 10 INJECTION, EMULSION INTRAVENOUS at 08:40

## 2021-04-06 RX ADMIN — PROPOFOL 160 MCG/KG/MIN: 10 INJECTION, EMULSION INTRAVENOUS at 08:40

## 2021-04-06 RX ADMIN — LIDOCAINE HYDROCHLORIDE 60 MG: 20 INJECTION, SOLUTION INFILTRATION; PERINEURAL at 08:39

## 2021-04-06 RX ADMIN — SODIUM CHLORIDE, POTASSIUM CHLORIDE, SODIUM LACTATE AND CALCIUM CHLORIDE 30 ML/HR: 600; 310; 30; 20 INJECTION, SOLUTION INTRAVENOUS at 08:22

## 2021-04-06 RX ADMIN — SODIUM CHLORIDE, POTASSIUM CHLORIDE, SODIUM LACTATE AND CALCIUM CHLORIDE: 600; 310; 30; 20 INJECTION, SOLUTION INTRAVENOUS at 08:35

## 2021-04-06 NOTE — ANESTHESIA PREPROCEDURE EVALUATION
Anesthesia Evaluation     Patient summary reviewed and Nursing notes reviewed                Airway   Mallampati: II  TM distance: >3 FB  Neck ROM: full  No difficulty expected  Dental      Pulmonary - normal exam   (+) pneumonia resolved ,     ROS comment: Seasonal allergies   Cardiovascular - negative cardio ROS and normal exam  Exercise tolerance: good (4-7 METS)        Neuro/Psych  (+) numbness, psychiatric history Depression,     GI/Hepatic/Renal/Endo - negative ROS     Musculoskeletal     (+) back pain,   Abdominal     Abdomen: soft.   Substance History - negative use     OB/GYN negative ob/gyn ROS         Other      history of cancer remission                  Anesthesia Plan    ASA 2     MAC     intravenous induction     Anesthetic plan, all risks, benefits, and alternatives have been provided, discussed and informed consent has been obtained with: patient.

## 2021-04-06 NOTE — DISCHARGE INSTRUCTIONS
For the next 24 hours patient needs to be with a responsible adult.    For 24 hours DO NOT drive, operate machinery, appliances, drink alcohol, make important decisions or sign legal documents.    Start with a light or bland diet if you are feeling sick to your stomach otherwise advance to regular diet as tolerated.    Follow recommendations on procedure report if provided by your doctor.    Call Dr Wild for problems [phone]    Problems may include but not limited to: large amounts of bleeding, trouble breathing, repeated vomiting, severe unrelieved pain, fever or chills.

## 2021-04-06 NOTE — ANESTHESIA POSTPROCEDURE EVALUATION
"Patient: June Baca    Procedure Summary     Date: 04/06/21 Room / [facility]:  [facility]    Anesthesia Start: 0836 Anesthesia Stop: 0908    Procedure: COLONOSCOPY INTO CECUM AND T.I. WITH COLD SNARE POLYPECTOMIES AND COLD BIOPSY POLYPECTOMIES (N/A ) Diagnosis:       Encounter for screening for malignant neoplasm of colon      (Encounter for screening for malignant neoplasm of colon [Z12.11])    Surgeons: Neli Wild MD Provider: Neli Fuchs MD    Anesthesia Type: MAC ASA Status: 2          Anesthesia Type: MAC    Vitals  No vitals data found for the desired time range.          Post Anesthesia Care and Evaluation    Patient location during evaluation: bedside  Patient participation: complete - patient participated  Level of consciousness: awake  Pain score: 0  Pain management: adequate  Airway patency: patent  Anesthetic complications: No anesthetic complications  PONV Status: none  Cardiovascular status: acceptable  Respiratory status: acceptable  Hydration status: acceptable    Comments: /58 (BP Location: Left arm, Patient Position: Lying)   Pulse 73   Resp 13   Ht 154.9 cm (61\")   Wt 56.7 kg (125 lb)   LMP  (LMP Unknown)   SpO2 99%   BMI 23.62 kg/m²       "

## 2021-04-06 NOTE — H&P
[facility]  Pre Procedure History & Physical    Chief Complaint:   screening    Subjective     HPI:   66 yo here today for colonoscopy.  Pt reports no FH CRC/polyps. Last exam .    Past Medical History:   Past Medical History:   Diagnosis Date   • Allergic 1965    seasonal, lifelong   • Depression 1998    several months only   • Drug therapy 2013   • Endometriosis    • Hx of radiation therapy 2013    Right breast   • Inguinal hernia    • Invasive ductal carcinoma of breast (CMS/HCC)     RIGHT WITH METAPLASTIC FEATURES (SPINDLE CELL METAPLASIA) 1.5CM GRADE 2, ER WEAKLY POSITIVE, UT NEGATIVE, HER-2/SOLITARIO NEGATIVE   • Low back pain 1991    usually mild, chiropractic maintenance   • Osteopenia    • Ovarian cyst    • Peripheral neuropathy     RELATED TO TAXOL   • Pneumonia  &    • Postmenopausal    • Uterine polyp        Past Surgical History:  Past Surgical History:   Procedure Laterality Date   • BREAST BIOPSY     • BREAST LUMPECTOMY Right 2013   • BREAST LUMPECTOMY WITH SENTINEL NODE BIOPSY Right 2013   • BREAST SURGERY  13    lumpectomy   • COLONOSCOPY  ?   • CYSTECTOMY     • D & C AND LAPAROSCOPY     • HERNIA REPAIR     • LAPAROSCOPIC OVARIAN CYSTECTOMY     • LYMPH NODE BIOPSY  13    related to breast cancer   • MEDIPORT INSERTION, SINGLE  2013    DR. MIRANDA       Family History:  Family History   Problem Relation Age of Onset   • Diabetes Mother         type 2   • Hypertension Father    • Prostate cancer Father    • Cancer Father         prostate, 12(?) yrs. healthy   • Hearing loss Father         resulting from shingles   • Heart disease Father         TAVR procedure    • Cancer Maternal Aunt         pancreatic,  2017 age 83   • Cancer Maternal Aunt         stage 4 breast cancer, diagnosed 2017   • Breast cancer Maternal Aunt    • Malig Hyperthermia Neg Hx        Social History:   reports that she has never  "smoked. She has never used smokeless tobacco. She reports current alcohol use of about 3.0 standard drinks of alcohol per week. She reports that she does not use drugs.    Medications:   Medications Prior to Admission   Medication Sig Dispense Refill Last Dose   • Ascorbic Acid (VITAMIN C ER) 1000 MG tablet controlled-release Take 1,000 mg by mouth Daily.   Past Week at Unknown time   • B Complex Vitamins (B COMPLEX PO) Take  by mouth.   Past Week at Unknown time   • betamethasone dipropionate 0.05 % cream Apply  topically to the appropriate area as directed 2 (Two) Times a Day. 45 g 2 Past Week at Unknown time   • Cholecalciferol (VITAMIN D) 1000 UNITS tablet Take 1,000 Units by mouth.   Past Week at Unknown time   • Fluticasone Propionate (FLONASE ALLERGY RELIEF NA) into each nostril.   Past Week at Unknown time   • levocetirizine (XYZAL) 5 MG tablet Take 5 mg by mouth 2 (Two) Times a Day.   4/5/2021 at Unknown time   • Multiple Vitamins-Minerals (MULTIVITAMIN ADULT PO) Take  by mouth.   Past Week at Unknown time   • pseudoephedrine (SUDAFED) 120 MG 12 hr tablet Take 120 mg by mouth Every 12 (Twelve) Hours.   Past Week at Unknown time   • raloxifene (EVISTA) 60 MG tablet TAKE 1 TABLET DAILY 90 tablet 3 4/5/2021 at Unknown time       Allergies:  Latex    ROS:    Pertinent items are noted in HPI, all other systems reviewed and negative     Objective     Blood pressure 122/58, pulse 73, resp. rate 13, height 154.9 cm (61\"), weight 56.7 kg (125 lb), SpO2 99 %, not currently breastfeeding.    Physical Exam   Constitutional: Pt is oriented to person, place, and time and well-developed, well-nourished, and in no distress.   Mouth/Throat: Oropharynx is clear and moist.   Neck: Normal range of motion.   Cardiovascular: Normal rate, regular rhythm    Pulmonary/Chest: Effort normal    Abdominal: Soft. Nontender  Skin: Skin is warm and dry.   Psychiatric: Mood, memory, affect and judgment normal.     Assessment/Plan "     Diagnosis:  Screening for colon cancer    Anticipated Surgical Procedure:  colonoscopy    The risks, benefits, and alternatives of this procedure have been discussed with the patient or the responsible party- the patient understands and agrees to proceed.

## 2021-04-07 ENCOUNTER — TELEPHONE (OUTPATIENT)
Dept: GASTROENTEROLOGY | Facility: CLINIC | Age: 66
End: 2021-04-07

## 2021-04-07 LAB
LAB AP CASE REPORT: NORMAL
PATH REPORT.FINAL DX SPEC: NORMAL
PATH REPORT.GROSS SPEC: NORMAL

## 2021-04-07 NOTE — TELEPHONE ENCOUNTER
Called pt and advised of Dr Wild's note.   Verb understanding.     C/s placed in recall and hm for 04/06/2024.

## 2021-08-02 ENCOUNTER — APPOINTMENT (OUTPATIENT)
Dept: WOMENS IMAGING | Facility: HOSPITAL | Age: 66
End: 2021-08-02

## 2021-08-02 PROCEDURE — 77067 SCR MAMMO BI INCL CAD: CPT | Performed by: RADIOLOGY

## 2021-08-02 PROCEDURE — 77063 BREAST TOMOSYNTHESIS BI: CPT | Performed by: RADIOLOGY

## 2021-09-21 ENCOUNTER — OFFICE VISIT (OUTPATIENT)
Dept: ONCOLOGY | Facility: CLINIC | Age: 66
End: 2021-09-21

## 2021-09-21 ENCOUNTER — LAB (OUTPATIENT)
Dept: LAB | Facility: HOSPITAL | Age: 66
End: 2021-09-21

## 2021-09-21 VITALS
HEIGHT: 61 IN | RESPIRATION RATE: 16 BRPM | OXYGEN SATURATION: 100 % | TEMPERATURE: 97.7 F | HEART RATE: 86 BPM | DIASTOLIC BLOOD PRESSURE: 77 MMHG | WEIGHT: 125.3 LBS | SYSTOLIC BLOOD PRESSURE: 119 MMHG | BODY MASS INDEX: 23.66 KG/M2

## 2021-09-21 DIAGNOSIS — C50.011 MALIGNANT NEOPLASM OF AREOLA OF RIGHT BREAST IN FEMALE, UNSPECIFIED ESTROGEN RECEPTOR STATUS (HCC): Primary | ICD-10-CM

## 2021-09-21 DIAGNOSIS — C50.011 MALIGNANT NEOPLASM OF AREOLA OF RIGHT BREAST IN FEMALE, UNSPECIFIED ESTROGEN RECEPTOR STATUS (HCC): ICD-10-CM

## 2021-09-21 LAB
ALBUMIN SERPL-MCNC: 4.1 G/DL (ref 3.5–5.2)
ALBUMIN/GLOB SERPL: 1.4 G/DL (ref 1.1–2.4)
ALP SERPL-CCNC: 69 U/L (ref 38–116)
ALT SERPL W P-5'-P-CCNC: 13 U/L (ref 0–33)
ANION GAP SERPL CALCULATED.3IONS-SCNC: 15.5 MMOL/L (ref 5–15)
AST SERPL-CCNC: 27 U/L (ref 0–32)
BASOPHILS # BLD AUTO: 0.06 10*3/MM3 (ref 0–0.2)
BASOPHILS NFR BLD AUTO: 1.3 % (ref 0–1.5)
BILIRUB SERPL-MCNC: 0.3 MG/DL (ref 0.2–1.2)
BUN SERPL-MCNC: 19 MG/DL (ref 6–20)
BUN/CREAT SERPL: 19 (ref 7.3–30)
CALCIUM SPEC-SCNC: 9.3 MG/DL (ref 8.5–10.2)
CHLORIDE SERPL-SCNC: 105 MMOL/L (ref 98–107)
CO2 SERPL-SCNC: 22.5 MMOL/L (ref 22–29)
CREAT SERPL-MCNC: 1 MG/DL (ref 0.6–1.1)
DEPRECATED RDW RBC AUTO: 47.7 FL (ref 37–54)
EOSINOPHIL # BLD AUTO: 0.1 10*3/MM3 (ref 0–0.4)
EOSINOPHIL NFR BLD AUTO: 2.1 % (ref 0.3–6.2)
ERYTHROCYTE [DISTWIDTH] IN BLOOD BY AUTOMATED COUNT: 13 % (ref 12.3–15.4)
GFR SERPL CREATININE-BSD FRML MDRD: 56 ML/MIN/1.73
GLOBULIN UR ELPH-MCNC: 3 GM/DL (ref 1.8–3.5)
GLUCOSE SERPL-MCNC: 87 MG/DL (ref 74–124)
HCT VFR BLD AUTO: 39.6 % (ref 34–46.6)
HGB BLD-MCNC: 12.5 G/DL (ref 12–15.9)
IMM GRANULOCYTES # BLD AUTO: 0.01 10*3/MM3 (ref 0–0.05)
IMM GRANULOCYTES NFR BLD AUTO: 0.2 % (ref 0–0.5)
LYMPHOCYTES # BLD AUTO: 1.66 10*3/MM3 (ref 0.7–3.1)
LYMPHOCYTES NFR BLD AUTO: 34.9 % (ref 19.6–45.3)
MCH RBC QN AUTO: 31.3 PG (ref 26.6–33)
MCHC RBC AUTO-ENTMCNC: 31.6 G/DL (ref 31.5–35.7)
MCV RBC AUTO: 99 FL (ref 79–97)
MONOCYTES # BLD AUTO: 0.4 10*3/MM3 (ref 0.1–0.9)
MONOCYTES NFR BLD AUTO: 8.4 % (ref 5–12)
NEUTROPHILS NFR BLD AUTO: 2.53 10*3/MM3 (ref 1.7–7)
NEUTROPHILS NFR BLD AUTO: 53.1 % (ref 42.7–76)
NRBC BLD AUTO-RTO: 0 /100 WBC (ref 0–0.2)
PLATELET # BLD AUTO: 185 10*3/MM3 (ref 140–450)
PMV BLD AUTO: 10.1 FL (ref 6–12)
POTASSIUM SERPL-SCNC: 3.9 MMOL/L (ref 3.5–4.7)
PROT SERPL-MCNC: 7.1 G/DL (ref 6.3–8)
RBC # BLD AUTO: 4 10*6/MM3 (ref 3.77–5.28)
SODIUM SERPL-SCNC: 143 MMOL/L (ref 134–145)
WBC # BLD AUTO: 4.76 10*3/MM3 (ref 3.4–10.8)

## 2021-09-21 PROCEDURE — 99213 OFFICE O/P EST LOW 20 MIN: CPT | Performed by: INTERNAL MEDICINE

## 2021-09-21 PROCEDURE — 36415 COLL VENOUS BLD VENIPUNCTURE: CPT

## 2021-09-21 PROCEDURE — 85025 COMPLETE CBC W/AUTO DIFF WBC: CPT

## 2021-09-21 PROCEDURE — 80053 COMPREHEN METABOLIC PANEL: CPT

## 2021-09-21 NOTE — PROGRESS NOTES
Subjective .     REASONS FOR FOLLOWUP:   1. 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 Status post lumpectomy, sentinel lymph node biopsy 01/30 /2013.    2. Status post Mediport placement 02/27/2013 by Dr. Arita.    3. Baseline echocardiogram 03/07/2013 with normal ejection fraction of 61%.    4. Patient started on adjuvant chemotherapy with Adriamycin and Cytoxan on 3/11/2013 for 4 cycles, followed by weekly Taxol.    5. 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    6. Peripheral neuropathy related to weekly Taxol.   7. Mild neutropenia related to chemotherapy, required Neupogen for marrow support.    8. Moderate thrombocytopenia detected on 11/21/2014. The patient is here for further evaluation.   9. Patient switched from tamoxifen after 3 years to Evista as she could not tolerate tamoxifen and refused Arimidex.  10. 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       HISTORY OF PRESENT ILLNESS:  The patient is a 65 y.o. year old female who is here for follow-up with the above-mentioned history.    History of Present Illness     Patient is a 65-year-old female with history of T1c N0 M0 invasive ductal carcinoma with metaplastic features, spindle cell neoplasm which was ER weakly +10% NJ negative HER-2/corey negative who has been treated with chemotherapy followed by endocrine therapy.  Because she had ER 10% we went ahead and try tamoxifen.  After 3 years of tamoxifen she preferred to do Evista and did not want to go on Arimidex as it causes arthralgias and she plays the guitar.  She has been on Evista for 5 years and total of 8 years of treatment.    Patient keen on stopping Evista.  She is due for DEXA scan and we we will have her primary care physician  order the DEXA scan.  She is not too keen on continuing Evista at present.    She had a mammogram 2021 and its negative.  MRI of the lumbar spine shows arthritis and canal stenosis with mild disc bulging.      PAST MEDICAL HISTORY: Significant for laparoscopic surgery at [facility] for manuel rine polyps, ovarian cyst and endometriosis. She had ingrown toenails x2 and has had surgery on the toenails by Dr. Robbin Garcia. She has had D and C in the past for irregular bleeding. Inguinal hernia surgery on the right in .      OB/GYN HISTORY: Menarche age 15. She is postmenopausal; last LMP 2012.  0.      Past Medical History:   Diagnosis Date   • Allergic     seasonal, lifelong   • Depression 1998    several months only   • Drug therapy 2013   • Endometriosis    • Hx of radiation therapy 2013    Right breast   • Inguinal hernia    • Invasive ductal carcinoma of breast (CMS/HCC)     RIGHT WITH METAPLASTIC FEATURES (SPINDLE CELL METAPLASIA) 1.5CM GRADE 2, ER WEAKLY POSITIVE, IL NEGATIVE, HER-2/SOLITARIO NEGATIVE   • Low back pain 1991    usually mild, chiropractic maintenance   • Osteopenia    • Ovarian cyst    • Peripheral neuropathy     RELATED TO TAXOL   • Pneumonia  &    • Postmenopausal    • Uterine polyp        ONCOLOGIC HISTORY:  (History from previous dates can be found in the separate document.)  ONCOLOGIC HISTORY: The patient has had multiple cysts in both breasts requiring aspiration in the past and path has always been negative for malignancy back in 2006 and in 2010 subsequently she underwent aspiration even in 2011, w hich all were suggestive of fibroid adenoma. She has fibrocystic disease. More recently she underwent a mammogram on 12 - at that time she had a digital diagnostic mammogram. She had calcifications in both breasts which were thought to be benign and negative. She had a stable complicated cyst in the right breast at 12  o' clock position which was thought to be benign. Followup ultrasound of the right breast was recommended in six months. The complicated cyst in the right breast probably benign; however there was an inframammary lymph node in the right breast at 9 o 'clock position which was also thought to be benign. Hence a six month followup was suggested with ultrasound.     Subsequently on 01/04/13 she underwent right breast ultrasound, which showed that there was a stable round complicated cyst with thin margins measuring 3 mm from the right breast at 12 o' clock position. There were multiple stable oval elongated complicated cysts in the right breast with thin margins, which were thought to be maciel ign, but there was a new microlobulated taller-than-wide solid mass with partially defined margins, which was 1.4 cm in the right breast. Because of this new solid mass in the right breast, which was suspicious, ultrasound guided vacuum-assisted biopsy w as recommended. The biopsy was done on 01/08/13 - the right breast ten-thirty position core biopsy showed invasive mammary carcinoma, metaplastic-type, subtype adenocarcinoma with spindle cell metaplasia. It was a grade 2 tumor with a tubular differentiat ion score of 3, high nuclear grade score of 3 and mitotic figure score of 1. There was one mitotic figure per 10 HPF. There was no DCIS identified. The Accession # on this was [id number]. Estrogen receptor was 10%, progesterone receptor 0%, HER2/corey w as 0% and Ki-67 as elevated at 56%. She underwent a chest x-ray on 01/16/13 which showed that there were no suspicious pulmonary opacities seen. There was slight scarring on both lung apices. MRI of the breast was done on 01/21/13 which showed 1.5 cm i r regular enhancing mass within the axillary tale region of the right breast representing the biopsy proven site of malignancy. No evidence of axillary adenopathy was appreciated. There were no findings suspicious for malignancy in  the left breast. She d i d have some scattered foci of variable enhancement thought to be secondary to moderate fibrocystic breast changes. Subsequently patient was referred to Dr. Arita, who did a right breast ultrasound-guided lumpectomy with a right axillary sentinel lymph node dissection with lymphoscintigraphic guidance on 01/30/13. The pathology report (Accession # [id number]) showed sentinel lymph node #1 in the right axilla had 2 nodes, a smaller node which did not show any malignancy and the larger node also did not sh o w malignancy. The sentinel lymph node #2 in the right axilla did not show any malignancy. The right breast lumpectomy specimen did show invasive ductal carcinoma with metaplastic features. The margins of resection were negative. The right breast additi o nal superior margin did not show any tumor. The lateral margin, the inferior margin as well as the deep margin did not show any malignancy. There was skeletal muscle present at the deep margin, but did not have any malignancy. The tumor size was 1.5 cm . It was considered as invasive ductal carcinoma metaplastic-type adenocarcinoma with spindle cell metaplasia. It had a Roberto score of 7 out of 9 with mitotic score of 1, nuclear pleomorphism of 3 and tubular differentiation of 3. It was a grade 2 t umor. There was a single focus of invasive carcinoma. Ductal carcinoma in situ was not present. There was some atypical ductal hyperplasia. Margins were uninvolved with invasive carcinoma. Distance from the closest margins was 4 mm. Deep margin on s p ecimen #3. Additional deep margin tissue on specimen #7 is negative as well, including skeletal muscle. The invasive carcinoma is 5 mm from the superior margin adjacent to the skin. It was a [id number] invasive carcinoma with metaplastic features with anna nocarcinoma with spindle cell features. ER is 10%, TN is 0%, HER2/corey is 0% and Ki-67 is 56%. Patient has been referred here for further options  of treatment.      The patient was felt to have higher risk disease due to the metaplastic spindle cell nature of her tumor despite its size and negative nodes. It was recommended she proceed with adjuvant chemotherapy using dose dense Adriamycin/Cytoxan chemotherapy q. 2 weeks with Neulasta support x four cycles, followed by weekly Taxol x12 doses. 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 We will consider holding further Taxol if she develops any significant symptoms.      The patient underwent Mediport placement 02/27/13. [facility] echocardiogram 03/07/13 showed an ejection fraction of 61%. The patient was found to have thrombocytopenia with a platelet count initially of 136,000; on followup 03/08/13 it was 108,000. White count and hemoglobin were normal. IPF was significa n tly elevated at 24.7% suggestive of underlying diagnosis of ITP. We will check a B12, folate, ROLA panel. We will proceed on with treatment as planned. There was no splenomegaly on exam. We will follow her platelet count closely through treatment. If indeed she has ITP there may be some improvement with chemotherapy and steroids.    The patient was seen on 6/17/13 with worsening neutropenia related to Taxol. The decision was made to add Neupogen x2 days with each weekly Taxol dose to hopefully continue on schedule without delay.    Patient has no worsening of peripheral neuropathy when she was evaluated on 7/8/13. We will continue to finish Taxol weekly for three more doses. Patient was instructed to increase her vitamin B12 to 1000 mcg and vitamin B6 at 50 mg.    Patient was on tamoxifen initially but had severe hot flashes.  Hormone status was checked and she was postmenopausal as a result we tried to switch her to Arimidex but because of osteopenia patient refused.  We offered Fosamax orally or prolia subcutaneous and she did not want that.    Given her  ER was only 10% and TX negative we could consider continuing tamoxifen or offer Evista for chemotherapy prophylaxis and she chose to go on Evista.  She is tolerating Evista very well.    Mammography 07/10/2014 shows focal asymmetry in the right breast which is benign and stable calcifications in the left breast which is benign. One year followup recommended.    Mammogram done 07/13/2015 is negative.    Mammogram July 2016 is negative.  Mammogram screening August 27 2018- negative    Patient is on Evista.    Right breast ultrasound, 10:30 position, 6 cm from the nipple, 1.3 cm lesion on ultrasound but biopsy of that showed fat necrosis, stromal fibrosis and chronic inflammation.  Done at [facility].    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 She is a  at [employer]. She drinks 5-7 glasses of wine per week. She does not smoke. She drinks occasional bourbon but doesn’ t drink any whiskey. No risk factors for HIV. No tattoos, no previous blood transfusions.      FAMILY HISTORY: Father had history of prostate cancer at age 73, but he is 85 in good health. Mother is 82 and in fair health. She has a brother who is 48 years old and two sisters ages 53 and 51, all of them in good health. There is no family histor y of breast cancer. There is no history of cancer in extended family as well.      Current Outpatient Medications on File Prior to Visit   Medication Sig Dispense Refill   • Ascorbic Acid (VITAMIN C ER) 1000 MG tablet controlled-release Take 1,000 mg by mouth Daily.     • B Complex Vitamins (B COMPLEX PO) Take  by mouth.     • betamethasone dipropionate 0.05 % cream Apply  topically to the appropriate area as directed 2 (Two) Times a Day. 45 g 2   • Cholecalciferol (VITAMIN D) 1000 UNITS tablet Take 1,000 Units by mouth.     • Fluticasone Propionate (FLONASE ALLERGY RELIEF NA) into each nostril.     • levocetirizine (XYZAL) 5 MG tablet Take 5 mg by mouth  Daily.     • Multiple Vitamins-Minerals (MULTIVITAMIN ADULT PO) Take  by mouth.     • pseudoephedrine (SUDAFED) 120 MG 12 hr tablet Take 120 mg by mouth Every 12 (Twelve) Hours.     • raloxifene (EVISTA) 60 MG tablet TAKE 1 TABLET DAILY 90 tablet 3     No current facility-administered medications on file prior to visit.       ALLERGIES:     Allergies   Allergen Reactions   • Latex Rash       Social History     Socioeconomic History   • Marital status:      Spouse name: Drew   • Number of children: Not on file   • Years of education: College   • Highest education level: Not on file   Tobacco Use   • Smoking status: Never Smoker   • Smokeless tobacco: Never Used   Vaping Use   • Vaping Use: Never used   Substance and Sexual Activity   • Alcohol use: Yes     Alcohol/week: 3.0 standard drinks     Types: 3 Standard drinks or equivalent per week     Comment: 3-4/week   • Drug use: No   • Sexual activity: Defer     Partners: Male     Birth control/protection: Post-menopausal, None     Comment: little or no intercourse last 5 yrs., PT (dilators) 2017         Cancer-related family history includes Breast cancer in her maternal aunt; Cancer in her father, maternal aunt, and maternal aunt; Prostate cancer in her father.     Review of Systems   Constitutional: Negative for appetite change, chills, diaphoresis, fatigue, fever and unexpected weight change.   HENT: Negative for hearing loss, sore throat and trouble swallowing.    Respiratory: Negative for cough, chest tightness, shortness of breath and wheezing.    Cardiovascular: Negative for chest pain, palpitations and leg swelling.   Gastrointestinal: Negative for abdominal distention, abdominal pain, constipation, diarrhea, nausea and vomiting.   Genitourinary: Negative for dysuria, frequency, hematuria and urgency.   Musculoskeletal: Negative for joint swelling.        No muscle weakness.   Skin: Negative for rash and wound.   Neurological: Negative for seizures,  "syncope, speech difficulty, weakness, numbness and headaches.   Hematological: Negative for adenopathy. Does not bruise/bleed easily.   Psychiatric/Behavioral: Negative for behavioral problems, confusion and suicidal ideas.   All other systems reviewed and are negative.    Patient does complain of vaginal dryness.  No change in her review of systems    Objective      Vitals:    09/21/21 1027   BP: 119/77   Pulse: 86   Resp: 16   Temp: 97.7 °F (36.5 °C)   TempSrc: Temporal   SpO2: 100%   Weight: 56.8 kg (125 lb 4.8 oz)   Height: 154.9 cm (60.98\")   PainSc: 0-No pain     Current Status 9/21/2021   ECOG score 0       Physical Exam          CONSTITUTIONAL:  Vital signs reviewed.  No distress, looks comfortable.  EYES:  Conjunctivae and lids unremarkable.  PERRLA  BREAST: Right breast: No skin changes, no evidence of breast mass, no nipple discharge, no evidence of any right axillary adenopathy or right supraclavicular adenopathy  Left breast: No evidence of any skin changes, no evidence of any left breast mass and no evidence of left nipple discharge as well as no left axillary adenopathy or left supraclavicular adenopathy.  RESPIRATORY:  Normal respiratory effort.  Lungs clear to auscultation bilaterally.  CARDIOVASCULAR:  Normal S1, S2.  No murmurs rubs or gallops.  No significant lower extremity edema.  GASTROINTESTINAL: Abdomen appears unremarkable.  Nontender.  No hepatomegaly.  No splenomegaly.  LYMPHATIC:  No cervical, supraclavicular, axillary lymphadenopathy.  SKIN:  Warm.  No rashes.  PSYCHIATRIC:  Normal judgment and insight.  Normal mood and affect.          RECENT LABS:  Hematology WBC   Date Value Ref Range Status   09/21/2021 4.76 3.40 - 10.80 10*3/mm3 Final   12/01/2020 4.79 3.40 - 10.80 10*3/mm3 Final     RBC   Date Value Ref Range Status   09/21/2021 4.00 3.77 - 5.28 10*6/mm3 Final   12/01/2020 3.81 3.77 - 5.28 10*6/mm3 Final     Hemoglobin   Date Value Ref Range Status   09/21/2021 12.5 12.0 - 15.9 " g/dL Final     Hematocrit   Date Value Ref Range Status   09/21/2021 39.6 34.0 - 46.6 % Final     Platelets   Date Value Ref Range Status   09/21/2021 185 140 - 450 10*3/mm3 Final          Assessment/Plan   · 1. This is a patient with history of stage I breast cancer, currently on tamoxifen.  She is received 3 years of tamoxifen.  She does have osteopenia.  Given that her ER 0% AK 0%, HER 2 negative  intention is to give with chemoprophylaxis for a new breast primary we discussed about switching to Evista which helps with both osteopenia and also helps in chemoprevention.      · Patient is on Evista.  · Since patient completed 3 years of tamoxifen followed by 5 years of Evista she would like to discontinue that and given she has done 5 years of Evista we discussed with her that we could hold off.  · Patient never wanted aromatase inhibitor as she is a  and she has some joint pains already and did not want arthralgias associated with aromatase inhibitor.  · Reviewed mammogram from August 2, 2021 and is negative    2.  Density in the right breast on screening mammogram July 2019 which appears larger.  This is in the area of the postsurgical scar.  · Right diagnostic mammogram and ultrasound shows 11 x 7 x 13 mm density which is suspicious.    · Patient will need ultrasound-guided biopsy of the right breast mass  · Ultrasound-guided biopsy was done August 28, 2019 and pathology is consistent with fat necrosis.    3. Peripheral neuropathy. The patient is to continue taking B12 and B6 with improvement in neuropathy symptoms.    4.  Severe vaginal dryness for which she needs to use Vagifem intermittently.  She understands that there can be some systemic absorption which could be responsible for breast cancer.  However given significant vaginal dryness and she wants to continue using Vagifem intermittently.  · She will discuss with her gynecologist.  · Patient understands there may be slight systemic  absorption of local Vagifem as well but if the symptoms are severe of vaginal dryness then she may need to consider intermittent Vagifem.  · Patient is currently taking Vagifem once weekly. She is currently undergoing dilation therapy which somewhat improves her symptoms.     5. Osteopenia: DEXA performed on 03/29/2019.   · Patient due for bone density and will have her primary care physician order the bone density      6.  Lifestyle modification: Patient reports she does not exercise regularly. I advised her to consider exercising 40 minutes daily 5 days a week.     7. 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    PLAN:   · Patient's MRI shows disc bulge and stenosis.  No metastasis  · We will discontinue Evista as she has completed 3 years of tamoxifen followed by 5 years of Evista  · Patient will be released from our office to follow-up with her gynecologist as well as primary care physician  · She will have yearly mammograms at her primary care physician or gynecologist and also yearly physical exam including breast exam from her gynecologist.  · We will release her from our office.  Jamia Bishop MD        Cc:   Dr. Faiza padilla

## 2021-11-02 ENCOUNTER — OFFICE VISIT (OUTPATIENT)
Dept: FAMILY MEDICINE CLINIC | Facility: CLINIC | Age: 66
End: 2021-11-02

## 2021-11-02 VITALS
RESPIRATION RATE: 16 BRPM | WEIGHT: 124 LBS | DIASTOLIC BLOOD PRESSURE: 70 MMHG | SYSTOLIC BLOOD PRESSURE: 114 MMHG | HEART RATE: 77 BPM | BODY MASS INDEX: 23.41 KG/M2 | OXYGEN SATURATION: 99 % | HEIGHT: 61 IN

## 2021-11-02 DIAGNOSIS — Z00.00 ROUTINE GENERAL MEDICAL EXAMINATION AT A HEALTH CARE FACILITY: Primary | ICD-10-CM

## 2021-11-02 DIAGNOSIS — Z78.0 POSTMENOPAUSAL: ICD-10-CM

## 2021-11-02 PROCEDURE — 99396 PREV VISIT EST AGE 40-64: CPT | Performed by: FAMILY MEDICINE

## 2021-11-02 NOTE — PROGRESS NOTES
"Preventive Exam    History of Present Illness: June is here for check up and review of routine health maintenance. She states she is doing well and has no concerns.  She has a past history of a malignant breast cancer that was diagnosed in 2013 and is no longer seeing her oncologist because she is in remission.  She is feels she is doing well.  Pap Smear:UTD  Mammogram:UTD  Colon cancer screening:UTD  Bone Density:ordered      REVIEW OF SYSTEMS  Constitutional: Negative.    HENT: Negative.    Eyes: Negative.    Respiratory: Negative.    Cardiovascular: Negative.    Gastrointestinal: Negative.    Endocrine: Negative.    Genitourinary: Negative.    Musculoskeletal: Negative.  Skin: Negative.    Allergic/Immunologic: Negative.    Neurological: Negative.    Hematological: Negative.    Psychiatric/Behavioral: Negative.    I have reviewed the ROS as documented by the MA. Mike Goldsmith MD       PHYSICAL EXAM    Vitals:    11/02/21 1004   BP: 114/70   Pulse: 77   Resp: 16   SpO2: 99%   Weight: 56.2 kg (124 lb)   Height: 154.9 cm (61\")     GENERAL: alert and oriented, afebrile and vital signs stable  HEENT: oral mucosa moist, PEERLA, EOM, conjunctiva normal  No cervical adenopathy  LUNGS: clear to ascultation bilaterally, no rales, ronchi or wheezing  HEART: RRR S1 S2 without murmers, thrills, rubs or gallops  CHEST WALL: within normal limits, no tenderness  ABDOMEN: WNL. Normal BS.  EXTREMITIES: No clubbing, cyanosis or edema noted. Normal Pulses.  SKIN: warm, dry, no rashes noted  NEURO: CN II- XII grossly intact  PSYCH: Good mood and positive affect  ASSESSMENT AND PLAN  Problem List Items Addressed This Visit     None      Visit Diagnoses     Routine general medical examination at a health care facility    -  Primary    Relevant Orders    Lipid Panel With / Chol / HDL Ratio    Postmenopausal        Relevant Orders    DEXA Bone Density Axial        Routine health maintenance reviewed and discussed with " June.  The patient was counseled regarding a healthy diet and exercise, appropriate routine screening and immunizations and encouraged to get regular dental and eye exams .    Return in about 1 year (around 11/2/2022) for Annual physical.

## 2021-11-02 NOTE — PATIENT INSTRUCTIONS
Medicare Wellness  Personal Prevention Plan of Service     Date of Office Visit:  2021  Encounter Provider:  Mike Goldsmith MD  Place of Service:  [facility]  Patient Name: June Baca  :  1955    As part of the Medicare Wellness portion of your visit today, we are providing you with this personalized preventive plan of services (PPPS). This plan is based upon recommendations of the [place] Preventive Services Task Force (USPSTF) and the Advisory Committee on Immunization Practices (ACIP).    This lists the preventive care services that should be considered, and provides dates of when you are due. Items listed as completed are up-to-date and do not require any further intervention.    Health Maintenance   Topic Date Due   • DXA SCAN  2021   • COVID-19 Vaccine (3 - Pfizer booster) 10/01/2021   • MAMMOGRAM  2022   • ANNUAL PHYSICAL  2022   • COLORECTAL CANCER SCREENING  2024   • PAP SMEAR  2024   • TDAP/TD VACCINES (2 - Td or Tdap) 2026   • INFLUENZA VACCINE  Completed   • ZOSTER VACCINE  Completed   • HEPATITIS C SCREENING  Addressed   • Pneumococcal Vaccine 0-64  Aged Out       Orders Placed This Encounter   Procedures   • DEXA Bone Density Axial     Standing Status:   Future     Standing Expiration Date:   11/3/2022   • Lipid Panel With / Chol / HDL Ratio     Order Specific Question:   Release to patient     Answer:   Immediate       Return in about 1 year (around 2022) for Annual physical.

## 2021-11-03 LAB
CHOLEST SERPL-MCNC: 227 MG/DL (ref 100–199)
CHOLEST/HDLC SERPL: 3.5 RATIO (ref 0–4.4)
HDLC SERPL-MCNC: 64 MG/DL
LDLC SERPL CALC-MCNC: 146 MG/DL (ref 0–99)
TRIGL SERPL-MCNC: 96 MG/DL (ref 0–149)
VLDLC SERPL CALC-MCNC: 17 MG/DL (ref 5–40)

## 2021-11-20 ENCOUNTER — HOSPITAL ENCOUNTER (OUTPATIENT)
Dept: BONE DENSITY | Facility: HOSPITAL | Age: 66
Discharge: HOME | End: 2021-11-20
Admitting: FAMILY MEDICINE

## 2021-11-20 DIAGNOSIS — Z78.0 POSTMENOPAUSAL: ICD-10-CM

## 2021-11-20 PROCEDURE — 77080 DXA BONE DENSITY AXIAL: CPT

## 2022-01-18 ENCOUNTER — OFFICE VISIT (OUTPATIENT)
Dept: FAMILY MEDICINE CLINIC | Facility: CLINIC | Age: 67
End: 2022-01-18

## 2022-01-18 VITALS
SYSTOLIC BLOOD PRESSURE: 104 MMHG | BODY MASS INDEX: 23.6 KG/M2 | HEIGHT: 61 IN | DIASTOLIC BLOOD PRESSURE: 70 MMHG | HEART RATE: 76 BPM | WEIGHT: 125 LBS | OXYGEN SATURATION: 97 % | RESPIRATION RATE: 16 BRPM

## 2022-01-18 DIAGNOSIS — M25.562 CHRONIC PAIN OF LEFT KNEE: Primary | ICD-10-CM

## 2022-01-18 DIAGNOSIS — G89.29 CHRONIC PAIN OF LEFT KNEE: Primary | ICD-10-CM

## 2022-01-18 DIAGNOSIS — M25.522 LEFT ELBOW PAIN: ICD-10-CM

## 2022-01-18 PROCEDURE — 99213 OFFICE O/P EST LOW 20 MIN: CPT | Performed by: FAMILY MEDICINE

## 2022-01-18 NOTE — PATIENT INSTRUCTIONS
I have referred you to ortho for left knee pain.  Please ask your colleagues if they have a preferred OT provider for elbow pain and let me know.

## 2022-01-18 NOTE — PROGRESS NOTES
Subjective   June Baca is a 66 y.o. female.   Leg Pain (Lt)    History of Present Illness   Stormy is here w/ c/o pain/swelling in her Lt foot and leg .  Leg pain is behind the knee.  She states the foot feels tingly.  Symptoms have been present for about a year off and on.She has not taken any OTC meds to treat.     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 She states the pain has become more of a problem. It runs down th lateral lower arm and is an aching pain. It improves with rest. She has been using icy hot with some relief.     The following portions of the patient's history were reviewed and updated as appropriate: allergies, current medications, past family history, past medical history, past social history, past surgical history and problem list.    Review of Systems   Constitutional: Negative.    HENT: Negative.    Eyes: Negative.    Genitourinary: Negative.    Musculoskeletal:        Left leg pain  Left arm pain   Skin: Negative.    Neurological: Negative.        Objective   Physical Exam  Vitals and nursing note reviewed.   Constitutional:       Appearance: She is well-developed.   HENT:      Head: Normocephalic and atraumatic.   Eyes:      Pupils: Pupils are equal, round, and reactive to light.   Cardiovascular:      Rate and Rhythm: Normal rate and regular rhythm.      Heart sounds: Normal heart sounds.   Pulmonary:      Effort: Pulmonary effort is normal.      Breath sounds: Normal breath sounds.   Musculoskeletal:      Comments: Swelling posterior left knee. FROM.  Left elbow has FROM, no tenderness noted.   Skin:     General: Skin is warm and dry.   Neurological:      Mental Status: She is alert and oriented to person, place, and time.           Assessment/Plan   Problem List Items Addressed This Visit     None      Visit Diagnoses     Chronic pain of left knee    -  Primary    Relevant Orders    Ambulatory  Referral to Orthopedic Surgery    Left elbow pain            I have asked her to review OT suggestions with musician colleagues and let me know their ideas and I can refer her.        Return if symptoms worsen or fail to improve.

## 2022-01-19 ENCOUNTER — OFFICE VISIT (OUTPATIENT)
Dept: ORTHOPEDIC SURGERY | Facility: CLINIC | Age: 67
End: 2022-01-19

## 2022-01-19 VITALS — BODY MASS INDEX: 23.6 KG/M2 | TEMPERATURE: 98 F | WEIGHT: 125 LBS | HEIGHT: 61 IN

## 2022-01-19 DIAGNOSIS — M25.562 LEFT KNEE PAIN, UNSPECIFIED CHRONICITY: Primary | ICD-10-CM

## 2022-01-19 DIAGNOSIS — M71.22 BAKER'S CYST OF KNEE, LEFT: ICD-10-CM

## 2022-01-19 PROCEDURE — 99203 OFFICE O/P NEW LOW 30 MIN: CPT | Performed by: ORTHOPAEDIC SURGERY

## 2022-01-19 PROCEDURE — 73562 X-RAY EXAM OF KNEE 3: CPT | Performed by: ORTHOPAEDIC SURGERY

## 2022-01-19 NOTE — PROGRESS NOTES
Patient Name: June Baca   YOB: 1955  Referring Primary Care Physician: Mike Goldsmith MD  BMI: Body mass index is 23.62 kg/m².    Chief Complaint:    Chief Complaint   Patient presents with   • Left Knee - Initial Evaluation        HPI:     June Baca is a 66 y.o. female who presents today for evaluation of   Chief Complaint   Patient presents with   • Left Knee - Initial Evaluation   .  Patient is seen today complaining of some mild left knee pain that is bothered her for the last 2 years.  She denies any kind of injury.  She said she was concerned about blood clots as she had a history of breast cancer with treatment about 10 years ago.  She does get some tingling in her hands and feet but thinks is related to her chemo.  She saw her primary care yesterday who said it was not a clot but a Baker's cyst and sent her here today      Subjective   Medications:   Home Medications:  Current Outpatient Medications on File Prior to Visit   Medication Sig   • Ascorbic Acid (VITAMIN C ER) 1000 MG tablet controlled-release Take 1,000 mg by mouth Daily.   • B Complex Vitamins (B COMPLEX PO) Take  by mouth.   • betamethasone dipropionate 0.05 % cream Apply  topically to the appropriate area as directed 2 (Two) Times a Day.   • Cholecalciferol (VITAMIN D) 1000 UNITS tablet Take 1,000 Units by mouth.   • Fluticasone Propionate (FLONASE ALLERGY RELIEF NA) into each nostril.   • levocetirizine (XYZAL) 5 MG tablet Take 5 mg by mouth Daily.   • Multiple Vitamins-Minerals (MULTIVITAMIN ADULT PO) Take  by mouth.   • pseudoephedrine (SUDAFED) 120 MG 12 hr tablet Take 120 mg by mouth Every 12 (Twelve) Hours.     No current facility-administered medications on file prior to visit.     Current Medications:  Scheduled Meds:  Continuous Infusions:No current facility-administered medications for this visit.    PRN Meds:.    I have reviewed the patient's medical history in detail and updated the  computerized patient record.  Review and summarization of old records includes:    Past Medical History:   Diagnosis Date   • Allergic 1965    seasonal, lifelong   • Depression March 1998    several months only   • Drug therapy 03/12/2013   • Endometriosis    • Hx of radiation therapy 08/02/2013    Right breast   • Inguinal hernia    • Invasive ductal carcinoma of breast (HCC)     RIGHT WITH METAPLASTIC FEATURES (SPINDLE CELL METAPLASIA) 1.5CM GRADE 2, ER WEAKLY POSITIVE, ND NEGATIVE, HER-2/SOLITARIO NEGATIVE   • Low back pain 1/1991    usually mild, chiropractic maintenance   • Osteopenia    • Ovarian cyst    • Peripheral neuropathy     RELATED TO TAXOL   • Pneumonia 1965 & 1978   • Postmenopausal    • Uterine polyp         Past Surgical History:   Procedure Laterality Date   • BREAST BIOPSY     • BREAST LUMPECTOMY Right 01/30/2013   • BREAST LUMPECTOMY WITH SENTINEL NODE BIOPSY Right 01/30/2013   • BREAST SURGERY  1/30/13    lumpectomy   • COLONOSCOPY  2009?   • COLONOSCOPY N/A 4/6/2021    Procedure: COLONOSCOPY INTO CECUM AND T.I. WITH COLD SNARE POLYPECTOMIES AND COLD BIOPSY POLYPECTOMIES;  Surgeon: Neli Wild MD;  Location: [facility] ENDOSCOPY;  Service: Gastroenterology;  Laterality: N/A;  PRE- SCREENING  POST- DIVERTICULOSIS, POLYPS, HEMORRHOIDS   • CYSTECTOMY     • D & C AND LAPAROSCOPY     • HERNIA REPAIR  1987   • LAPAROSCOPIC OVARIAN CYSTECTOMY     • LYMPH NODE BIOPSY  1/30/13    related to breast cancer   • MEDIPORT INSERTION, SINGLE  02/27/2013    DR. MIRANDA        Social History     Occupational History   • Occupation:      Employer: [employer]   Tobacco Use   • Smoking status: Never Smoker   • Smokeless tobacco: Never Used   Vaping Use   • Vaping Use: Never used   Substance and Sexual Activity   • Alcohol use: Yes     Alcohol/week: 3.0 standard drinks     Types: 3 Standard drinks or equivalent per week     Comment: 5 oz/week   • Drug use: No   • Sexual  "activity: Defer     Partners: Male     Birth control/protection: Post-menopausal, None     Comment: little or no intercourse last 5 yrs., PT (dilators) 2017      Social History     Social History Narrative    Lives at home with         Family History   Problem Relation Age of Onset   • Diabetes Mother         type 2   • Hypertension Father    • Prostate cancer Father    • Cancer Father         prostate, 12(?) yrs. healthy   • Hearing loss Father         resulting from shingles   • Heart disease Father         TAVR procedure 2016   • Cancer Maternal Aunt         pancreatic,  2017 age 83   • Cancer Maternal Aunt         stage 4 breast cancer, diagnosed 2017   • Breast cancer Maternal Aunt    • Malig Hyperthermia Neg Hx        ROS: 14 point review of systems was performed and all other systems were reviewed and are negative except for documented findings in HPI and today's encounter.     Allergies:   Allergies   Allergen Reactions   • Latex Rash     Constitutional:  Denies fever, shaking or chills   Eyes:  Denies change in visual acuity   HENT:  Denies nasal congestion or sore throat   Respiratory:  Denies cough or shortness of breath   Cardiovascular:  Denies chest pain or severe LE edema   GI:  Denies abdominal pain, nausea, vomiting, bloody stools or diarrhea   Musculoskeletal:  Numbness, tingling, pain, or loss of motor function only as noted above in history of present illness.  : Denies painful urination or hematuria  Integument:  Denies rash, lesion or ulceration   Neurologic:  Denies headache or focal weakness  Endocrine:  Denies lymphadenopathy  Psych:  Denies confusion or change in mental status   Hem:  Denies active bleeding    OBJECTIVE:  Physical Exam: 66 y.o. female  Wt Readings from Last 3 Encounters:   22 56.7 kg (125 lb)   22 56.7 kg (125 lb)   21 56.2 kg (124 lb)     Ht Readings from Last 1 Encounters:   22 154.9 cm (61\")     Body mass index is 23.62 " kg/m².  Vitals:    01/19/22 0859   Temp: 98 °F (36.7 °C)     Vital signs reviewed.     General Appearance:    Alert, cooperative, in no acute distress                  Eyes: conjunctiva clear  ENT: external ears and nose atraumatic  CV: no peripheral edema  Resp: normal respiratory effort  Skin: no rashes or wounds; normal turgor  Psych: mood and affect appropriate  Lymph: no nodes appreciated  Neuro: gross sensation intact  Vascular:  Palpable peripheral pulse in noted extremity  Musculoskeletal Extremities: Exam today shows good range of motion of her knee she does have Baker's cyst her Denise's is negative she has small medial osteophytes and optically tender and her ligaments are stable.  Her calf is very soft and nontender    Radiology:   AP lateral 40 degree PA x-rays taken of the left knee in office today for history of pain without comparison views available show moderate arthritic change with some evidence of swelling in both knees        Assessment:     ICD-10-CM ICD-9-CM   1. Left knee pain, unspecified chronicity  M25.562 719.46   2. Baker's cyst of knee, left  M71.22 727.51        MDM/Plan:   The diagnosis(es), natural history, pathophysiology and treatment for diagnosis(es) were discussed. Opportunity given and questions answered.  Biomechanics of pertinent body areas discussed.  When appropriate, the use of ambulatory aids discussed.    Inflammation/pain control; with cold, heat, elevation and/or liniments discussed as appropriate  MEDICAL RECORDS reviewed from other provider(s) for past and current medical history pertinent to this complaint.  She tried taking some anti-inflammatories off and on to see if this remedied the swelling I see no evidence of mechanical deficiency and she has moderate arthritic change.  Not having a lot of pain she was just concerned about the swelling in the Baker's cyst and explained to her what that was.  I told her if it got to be painful we could also inject with  corticosteroid as needed answered her questions and see her back as necessary    1/19/2022    Dictated utilizing Dragon dictation

## 2022-01-20 DIAGNOSIS — M17.2 POST-TRAUMATIC OSTEOARTHRITIS OF BOTH KNEES: Primary | ICD-10-CM

## 2022-04-22 ENCOUNTER — TELEPHONE (OUTPATIENT)
Dept: FAMILY MEDICINE CLINIC | Facility: CLINIC | Age: 67
End: 2022-04-22

## 2022-04-22 NOTE — TELEPHONE ENCOUNTER
Caller: June Baca    Relationship: Self    Best call back number: [phone]    What was the call regarding: PATIENT HAS RESULTS OF MRI THAT WAS ORDERED BY HER CHIROPRACTOR AND SHE WOULD LIKE TO KNOW IF DR. FORTUNE WOULD LIKE TO SEE THEM AND REFER HER FOR AN ULTRASOUND. A NODULE WAS FOUND ON HER THYROID. PATIENT CAN UPLOAD RESULTS INTO [facility].     Do you require a callback: YES

## 2022-05-18 ENCOUNTER — OFFICE VISIT (OUTPATIENT)
Dept: FAMILY MEDICINE CLINIC | Facility: CLINIC | Age: 67
End: 2022-05-18

## 2022-05-18 VITALS
RESPIRATION RATE: 16 BRPM | DIASTOLIC BLOOD PRESSURE: 74 MMHG | BODY MASS INDEX: 23.98 KG/M2 | HEART RATE: 72 BPM | OXYGEN SATURATION: 100 % | SYSTOLIC BLOOD PRESSURE: 110 MMHG | HEIGHT: 61 IN | WEIGHT: 127 LBS

## 2022-05-18 DIAGNOSIS — E04.1 THYROID NODULE: Primary | ICD-10-CM

## 2022-05-18 PROCEDURE — 99213 OFFICE O/P EST LOW 20 MIN: CPT | Performed by: FAMILY MEDICINE

## 2022-05-18 NOTE — PROGRESS NOTES
Subjective   June Baca is a 66 y.o. female.   Thyroid Problem (Nodule)    History of Present Illness   Stormy is here to discuss a thyroid nodule found inadvertently on a MRI of the neck.  The MRI was ordered by her chiropractor.  She works very hard to try to get a copy of the report into my chart but I have not been able to find it.  I advised her that regardless, she would benefit from having a evaluation by ENT.  I explained to her that she would also benefit from having a thyroid level lab.  She does not report any signs or symptoms of hypo or hyperthyroidism.  She has no complaints of weight gain or weight loss, loss of hair or change in her energy level.    The following portions of the patient's history were reviewed and updated as appropriate: allergies, current medications, past family history, past medical history, past social history, past surgical history and problem list.    Review of Systems    Objective   Physical Exam  Vitals and nursing note reviewed.   Constitutional:       Appearance: She is well-developed.   HENT:      Head: Normocephalic and atraumatic.   Eyes:      Pupils: Pupils are equal, round, and reactive to light.   Cardiovascular:      Rate and Rhythm: Normal rate and regular rhythm.      Heart sounds: Normal heart sounds.   Pulmonary:      Effort: Pulmonary effort is normal.      Breath sounds: Normal breath sounds.   Musculoskeletal:      Cervical back: Normal range of motion and neck supple.   Skin:     General: Skin is warm and dry.   Neurological:      Mental Status: She is alert and oriented to person, place, and time.           Assessment & Plan   Problem List Items Addressed This Visit    None     Visit Diagnoses     Thyroid nodule    -  Primary    Relevant Orders    TSH    Ambulatory Referral to ENT (Otolaryngology)        I have ordered a thyroid test and referred her to ENT for evaluation.       No follow-ups on file.   Answers for HPI/ROS submitted by the patient  on 5/16/2022  Please describe your symptoms.: A recent MRI ordered  by my chiropractor revealed a nodule on my thyroid.  Have you had these symptoms before?: No  How long have you been having these symptoms?: Greater than 2 weeks  What is the primary reason for your visit?: Other

## 2022-05-19 LAB — TSH SERPL DL<=0.005 MIU/L-ACNC: 2.8 UIU/ML (ref 0.45–4.5)

## 2022-05-31 ENCOUNTER — TELEPHONE (OUTPATIENT)
Dept: FAMILY MEDICINE CLINIC | Facility: CLINIC | Age: 67
End: 2022-05-31

## 2022-05-31 NOTE — TELEPHONE ENCOUNTER
Caller: June Baca    Relationship to patient: Self    Best call back number: [phone]    Date of positive COVID19 test: 05/30    COVID19 symptoms: STARTED 05/27-COUGH, TIRED, SCRATCH THROAT, SINUS CONGESTION, CHILLS, HEADACHE    Date of initial quarantine: 05/29    Additional information or concerns: WOULD LIKE SOMETHING CALLED IN FOR HER POSSIBLE PAXLOVID.     What is the patients preferred pharmacy:     [facility] [address] AT [address] [place] [phone] [place] [phone] [phone]

## 2022-08-09 ENCOUNTER — APPOINTMENT (OUTPATIENT)
Dept: WOMENS IMAGING | Facility: HOSPITAL | Age: 67
End: 2022-08-09

## 2022-08-09 PROCEDURE — 77067 SCR MAMMO BI INCL CAD: CPT | Performed by: RADIOLOGY

## 2022-08-09 PROCEDURE — 77063 BREAST TOMOSYNTHESIS BI: CPT | Performed by: RADIOLOGY

## 2023-01-11 ENCOUNTER — OFFICE VISIT (OUTPATIENT)
Dept: FAMILY MEDICINE CLINIC | Facility: CLINIC | Age: 68
End: 2023-01-11
Payer: COMMERCIAL

## 2023-01-11 VITALS
BODY MASS INDEX: 23.6 KG/M2 | SYSTOLIC BLOOD PRESSURE: 114 MMHG | DIASTOLIC BLOOD PRESSURE: 64 MMHG | WEIGHT: 125 LBS | HEIGHT: 61 IN | HEART RATE: 71 BPM | RESPIRATION RATE: 16 BRPM | OXYGEN SATURATION: 100 %

## 2023-01-11 DIAGNOSIS — Z00.00 ROUTINE GENERAL MEDICAL EXAMINATION AT A HEALTH CARE FACILITY: Primary | ICD-10-CM

## 2023-01-11 DIAGNOSIS — Z13.220 SCREENING FOR LIPOID DISORDERS: ICD-10-CM

## 2023-01-11 DIAGNOSIS — Z23 NEED FOR VACCINATION: ICD-10-CM

## 2023-01-11 LAB
ERYTHROCYTE [DISTWIDTH] IN BLOOD BY AUTOMATED COUNT: 12.8 % (ref 12.3–15.4)
HCT VFR BLD AUTO: 37.4 % (ref 34–46.6)
HGB BLD-MCNC: 12.2 G/DL (ref 12–15.9)
MCH RBC QN AUTO: 31.4 PG (ref 26.6–33)
MCHC RBC AUTO-ENTMCNC: 32.6 G/DL (ref 31.5–35.7)
MCV RBC AUTO: 96.1 FL (ref 79–97)
PLATELET # BLD AUTO: 271 10*3/MM3 (ref 140–450)
RBC # BLD AUTO: 3.89 10*6/MM3 (ref 3.77–5.28)
WBC # BLD AUTO: 4.94 10*3/MM3 (ref 3.4–10.8)

## 2023-01-11 PROCEDURE — 90471 IMMUNIZATION ADMIN: CPT | Performed by: FAMILY MEDICINE

## 2023-01-11 PROCEDURE — 99397 PER PM REEVAL EST PAT 65+ YR: CPT | Performed by: FAMILY MEDICINE

## 2023-01-11 PROCEDURE — 90677 PCV20 VACCINE IM: CPT | Performed by: FAMILY MEDICINE

## 2023-01-11 NOTE — PROGRESS NOTES
"Preventive Exam    History of Present Illness: June is here for check up and review of routine health maintenance. She states she is doing well and has no concerns.    Pap Smear:NI Less than 5 yrs ago  Mammogram:2022  Colon cancer screenin2021  Repeat in   STI screening:NI  Tobacco use :Never  Bone Density:21      REVIEW OF SYSTEMS  Constitutional: Negative.    HENT: Negative.    Eyes: Negative.    Respiratory: Negative.    Cardiovascular: Negative.    Gastrointestinal: Negative.    Endocrine: Negative.    Genitourinary: Negative.    Musculoskeletal: Negative.  Skin: Negative.    Allergic/Immunologic: Negative.    Neurological: Negative.    Hematological: Negative.    Psychiatric/Behavioral: Negative.    I have reviewed the ROS as documented by the MA. Mike Goldsmith MD       PHYSICAL EXAM    Vitals:    23 1029   BP: 114/64   Pulse: 71   Resp: 16   SpO2: 100%   Weight: 56.7 kg (125 lb)   Height: 154.9 cm (61\")     GENERAL: alert and oriented, afebrile and vital signs stable  HEENT: oral mucosa moist, PEERLA, EOM, conjunctiva normal  No cervical adenopathy  LUNGS: clear to ascultation bilaterally, no rales, ronchi or wheezing  HEART: RRR S1 S2 without murmers, thrills, rubs or gallops  CHEST WALL: within normal limits, no tenderness  ABDOMEN: WNL. Normal BS.  EXTREMITIES: No clubbing, cyanosis or edema noted. Normal Pulses.  SKIN: warm, dry, no rashes noted  NEURO: CN II- XII grossly intact  PSYCH: Good mood and positive affect  ASSESSMENT AND PLAN  Problem List Items Addressed This Visit    None  Visit Diagnoses     Routine general medical examination at a health care facility    -  Primary    Relevant Orders    Comprehensive Metabolic Panel    CBC (No Diff) (Completed)    Need for vaccination        Relevant Orders    Pneumococcal Conjugate Vaccine 20-Valent (PCV20) (Completed)    Screening for lipoid disorders        Relevant Orders    Lipid Panel With LDL / HDL Ratio        Routine " health maintenance reviewed and discussed with June.    Orders Placed This Encounter   Procedures   • Pneumococcal Conjugate Vaccine 20-Valent (PCV20)   • Lipid Panel With LDL / HDL Ratio   • Comprehensive Metabolic Panel   • CBC (No Diff)      Return in 1 year (on 1/11/2024) for Annual physical.

## 2023-01-11 NOTE — PATIENT INSTRUCTIONS
Your Annual Physical  Personal Prevention Plan      Date of Office Visit:    Encounter Provider:  Mike Goldsmith MD  Place of Service:  [facility] PRIMARY [facility]ARE  Patient Name: June Baca  :  1955    As part of the Annual Physical portion of your visit today, we are providing you with this personalized preventive plan of services (PPPS). This plan is based upon recommendations of the [place] Preventive Services Task Force (USPSTF) and the Advisory Committee on Immunization Practices (ACIP).    This lists the preventive care services that should be considered, and provides dates of when you are due. Items listed as completed are up-to-date and do not require any further intervention.    Health Maintenance   Topic Date Due    Pneumococcal Vaccine 65+ (1 - PCV) Never done    MAMMOGRAM  2023    DXA SCAN  2023    ANNUAL PHYSICAL  2024    COLORECTAL CANCER SCREENING  2024    PAP SMEAR  2024    TDAP/TD VACCINES (2 - Td or Tdap) 2026    COVID-19 Vaccine  Completed    INFLUENZA VACCINE  Completed    ZOSTER VACCINE  Completed    HEPATITIS C SCREENING  Addressed       Orders Placed This Encounter   Procedures    Pneumococcal Conjugate Vaccine 20-Valent (PCV20)    Lipid Panel With LDL / HDL Ratio     Order Specific Question:   Release to patient     Answer:   Routine Release    Comprehensive Metabolic Panel     Order Specific Question:   Release to patient     Answer:   Routine Release    CBC (No Diff)     Order Specific Question:   Release to patient     Answer:   Routine Release       Return for Annual physical.

## 2023-01-12 LAB
ALBUMIN SERPL-MCNC: 4.1 G/DL (ref 3.5–5.2)
ALBUMIN/GLOB SERPL: 1.7 G/DL
ALP SERPL-CCNC: 83 U/L (ref 39–117)
ALT SERPL-CCNC: 15 U/L (ref 1–33)
AST SERPL-CCNC: 25 U/L (ref 1–32)
BILIRUB SERPL-MCNC: 0.2 MG/DL (ref 0–1.2)
BUN SERPL-MCNC: 18 MG/DL (ref 8–23)
BUN/CREAT SERPL: 18.8 (ref 7–25)
CALCIUM SERPL-MCNC: 9.4 MG/DL (ref 8.6–10.5)
CHLORIDE SERPL-SCNC: 104 MMOL/L (ref 98–107)
CHOLEST SERPL-MCNC: 241 MG/DL (ref 0–200)
CO2 SERPL-SCNC: 25.6 MMOL/L (ref 22–29)
CREAT SERPL-MCNC: 0.96 MG/DL (ref 0.57–1)
EGFRCR SERPLBLD CKD-EPI 2021: 65.8 ML/MIN/1.73
GLOBULIN SER CALC-MCNC: 2.4 GM/DL
GLUCOSE SERPL-MCNC: 81 MG/DL (ref 65–99)
HDLC SERPL-MCNC: 55 MG/DL (ref 40–60)
LDLC SERPL CALC-MCNC: 174 MG/DL (ref 0–100)
LDLC/HDLC SERPL: 3.12 {RATIO}
POTASSIUM SERPL-SCNC: 4.8 MMOL/L (ref 3.5–5.2)
PROT SERPL-MCNC: 6.5 G/DL (ref 6–8.5)
SODIUM SERPL-SCNC: 140 MMOL/L (ref 136–145)
TRIGL SERPL-MCNC: 73 MG/DL (ref 0–150)
VLDLC SERPL CALC-MCNC: 12 MG/DL (ref 5–40)

## 2023-08-15 ENCOUNTER — APPOINTMENT (OUTPATIENT)
Dept: WOMENS IMAGING | Facility: HOSPITAL | Age: 68
End: 2023-08-15
Payer: COMMERCIAL

## 2023-08-15 PROCEDURE — 77067 SCR MAMMO BI INCL CAD: CPT | Performed by: RADIOLOGY

## 2023-08-15 PROCEDURE — 77063 BREAST TOMOSYNTHESIS BI: CPT | Performed by: RADIOLOGY

## 2024-01-12 ENCOUNTER — OFFICE VISIT (OUTPATIENT)
Dept: FAMILY MEDICINE CLINIC | Facility: CLINIC | Age: 69
End: 2024-01-12
Payer: COMMERCIAL

## 2024-01-12 VITALS
OXYGEN SATURATION: 100 % | BODY MASS INDEX: 21.9 KG/M2 | HEART RATE: 68 BPM | WEIGHT: 119 LBS | HEIGHT: 62 IN | RESPIRATION RATE: 16 BRPM | SYSTOLIC BLOOD PRESSURE: 114 MMHG | DIASTOLIC BLOOD PRESSURE: 66 MMHG

## 2024-01-12 DIAGNOSIS — Z00.00 ROUTINE GENERAL MEDICAL EXAMINATION AT A HEALTH CARE FACILITY: Primary | ICD-10-CM

## 2024-01-12 DIAGNOSIS — E78.00 ELEVATED CHOLESTEROL: ICD-10-CM

## 2024-01-12 DIAGNOSIS — Z78.0 POST-MENOPAUSAL: ICD-10-CM

## 2024-01-12 RX ORDER — ROSUVASTATIN CALCIUM 5 MG/1
5 TABLET, COATED ORAL DAILY
Qty: 90 TABLET | Refills: 1 | Status: SHIPPED | OUTPATIENT
Start: 2024-01-12

## 2024-01-12 NOTE — PATIENT INSTRUCTIONS
Your Annual Physical  Personal Prevention Plan      Date of Office Visit:    Encounter Provider:  Mike Goldsmith MD  Place of Service:  [facility]  Patient Name: June Baca  :  1955    As part of the Annual Physical portion of your visit today, we are providing you with this personalized preventive plan of services (PPPS). This plan is based upon recommendations of the [place] Preventive Services Task Force (USPSTF) and the Advisory Committee on Immunization Practices (ACIP).    This lists the preventive care services that should be considered, and provides dates of when you are due. Items listed as completed are up-to-date and do not require any further intervention.    Health Maintenance   Topic Date Due    DXA SCAN  2023    COLORECTAL CANCER SCREENING  2024    PAP SMEAR  2024    MAMMOGRAM  2024    LIPID PANEL  2025    ANNUAL PHYSICAL  2025    TDAP/TD VACCINES (2 - Td or Tdap) 2026    COVID-19 Vaccine  Completed    INFLUENZA VACCINE  Completed    Pneumococcal Vaccine 65+  Completed    ZOSTER VACCINE  Completed    HEPATITIS C SCREENING  Addressed       Orders Placed This Encounter   Procedures    DEXA Bone Density Axial     Standing Status:   Future     Standing Expiration Date:   2025     Order Specific Question:   Is patient taking or have taken long term Glucocorticoid (steroids)?     Answer:   No     Order Specific Question:   Does the patient have rheumatoid arthritis?     Answer:   No     Order Specific Question:   Does the patient have secondary osteoporosis?     Answer:   No     Order Specific Question:   Reason for Exam:     Answer:   post menopausal     Order Specific Question:   Does this patient have a diabetic monitoring/medication delivering device on?     Answer:   No     Order Specific Question:   Release to patient     Answer:   Routine Release [[id number]]       Return in about 2 months  (around 3/12/2024).

## 2024-01-12 NOTE — PROGRESS NOTES
"Preventive Exam    History of Present Illness: June is here for check up and review of routine health maintenance. She states she is doing well and has no concerns.    She has chronic hyperlipi and is ready to start a medication to treat.    Pap Smear: Ralf Christiano  Mammogram:8/16/2023  Colon cancer screening:  STI screening:NI  Tobacco use :Never  Bone Density: Ordered      REVIEW OF SYSTEMS  Constitutional: Negative.    HENT: Negative.    Eyes: Negative.    Respiratory: Negative.    Cardiovascular: Negative.    Gastrointestinal: Negative.    Endocrine: Negative.    Genitourinary: Negative.    Musculoskeletal: Negative.  Skin: Negative.    Allergic/Immunologic: Negative.    Neurological: Negative.    Hematological: Negative.    Psychiatric/Behavioral: Negative.    I have reviewed the ROS as documented by the MA. Mike Goldsmith MD       PHYSICAL EXAM    Vitals:    01/12/24 1022   BP: 114/66   Pulse: 68   Resp: 16   SpO2: 100%   Weight: 54 kg (119 lb)   Height: 156.2 cm (61.5\")     GENERAL: alert and oriented, afebrile and vital signs stable  HEENT: oral mucosa moist, PEERLA, EOM, conjunctiva normal  No cervical adenopathy  LUNGS: clear to ascultation bilaterally, no rales, ronchi or wheezing  HEART: RRR S1 S2 without murmers, thrills, rubs or gallops  CHEST WALL: within normal limits, no tenderness  ABDOMEN: WNL. Normal BS.  EXTREMITIES: No clubbing, cyanosis or edema noted. Normal Pulses.  SKIN: warm, dry, no rashes noted  NEURO: CN II- XII grossly intact  PSYCH: Good mood and positive affect  ASSESSMENT AND PLAN  Problem List Items Addressed This Visit          Cardiac and Vasculature    Elevated cholesterol    Overview     She is ready to try a medication to treat. I have started her on Crestor 5 mg a day. She will RTO for re-evaluation and I have advised her to call if she has any problems or questions.         Relevant Medications    rosuvastatin (Crestor) 5 MG tablet     Other Visit Diagnoses       " Routine general medical examination at a health care facility    -  Primary    Post-menopausal        Relevant Orders    DEXA Bone Density Axial          Routine health maintenance reviewed and discussed with June.  BMI is within normal parameters. No other follow-up for BMI required.     Orders Placed This Encounter   Procedures    DEXA Bone Density Axial      Return in about 2 months (around 3/12/2024).

## 2024-02-02 ENCOUNTER — TELEPHONE (OUTPATIENT)
Dept: FAMILY MEDICINE CLINIC | Facility: CLINIC | Age: 69
End: 2024-02-02

## 2024-02-02 NOTE — TELEPHONE ENCOUNTER
"  Caller: June Baca \"Stormy\"    Relationship to patient: Self    Best call back number: [phone]    Patient is needing: SHE IS JUST GETTING OVER A SICKNESS WHICH SHE THINKS WAS THE FLU.  SHE NOTICED THAT HER LYMPH NODED UNDER HER JAWS  ARE SWOLLEN.  DOES SHE NEED TO MAKE AN APPOINTMENT FOR THIS OR SHOULD IT BE FINE?        "

## 2024-02-20 ENCOUNTER — TELEPHONE (OUTPATIENT)
Dept: GASTROENTEROLOGY | Facility: CLINIC | Age: 69
End: 2024-02-20
Payer: COMMERCIAL

## 2024-02-20 ENCOUNTER — PREP FOR SURGERY (OUTPATIENT)
Dept: OTHER | Facility: HOSPITAL | Age: 69
End: 2024-02-20
Payer: COMMERCIAL

## 2024-02-20 DIAGNOSIS — Z86.010 HISTORY OF ADENOMATOUS POLYP OF COLON: Primary | ICD-10-CM

## 2024-02-20 NOTE — TELEPHONE ENCOUNTER
LAST C/S 4/6/21  IN EPIC      PERSONAL HX OF POLYPS    NO FAMILY HX OF POLYPS    NO FAMILY HX OF COLON CA    NO ASA OR BLOOD THINNERS        LIST OF  MEDICATIONS    ALEVE  ROSUVASTATIN   ZYZAL  FLONASE  ALLERGY SHOTS  MULTI VITAMIN VITAMIN B   CALCIUM  MAGNESIUM  GINOK BILOBA  ECHINACEA   CBD OIL           OA QUESTIONNAIRE SCANNED IN MEDIA

## 2024-02-21 ENCOUNTER — TELEPHONE (OUTPATIENT)
Dept: GASTROENTEROLOGY | Facility: CLINIC | Age: 69
End: 2024-02-21
Payer: COMMERCIAL

## 2024-02-21 PROBLEM — Z86.010 HISTORY OF ADENOMATOUS POLYP OF COLON: Status: ACTIVE | Noted: 2024-02-20

## 2024-02-21 PROBLEM — Z86.0101 HISTORY OF ADENOMATOUS POLYP OF COLON: Status: ACTIVE | Noted: 2024-02-20

## 2024-03-12 ENCOUNTER — OFFICE VISIT (OUTPATIENT)
Dept: FAMILY MEDICINE CLINIC | Facility: CLINIC | Age: 69
End: 2024-03-12
Payer: COMMERCIAL

## 2024-03-12 VITALS
SYSTOLIC BLOOD PRESSURE: 120 MMHG | BODY MASS INDEX: 22.36 KG/M2 | OXYGEN SATURATION: 98 % | DIASTOLIC BLOOD PRESSURE: 70 MMHG | HEART RATE: 68 BPM | WEIGHT: 121.5 LBS | HEIGHT: 62 IN | RESPIRATION RATE: 16 BRPM

## 2024-03-12 DIAGNOSIS — E78.00 ELEVATED CHOLESTEROL: Primary | ICD-10-CM

## 2024-03-12 PROCEDURE — 99213 OFFICE O/P EST LOW 20 MIN: CPT | Performed by: FAMILY MEDICINE

## 2024-03-12 NOTE — PROGRESS NOTES
"Subjective   June Baca is a 68 y.o. female.   Hyperlipidemia    History of Present Illness    Stormy is here for 2 month follow up on management of cholesterol.  She was started on rosuvastatin in January and will need labs today.  States she doing well on the medication and has no concerns  She is tolerating this very well and is anxious to see what her labs look like.Answers submitted by the patient for this visit:  Other (Submitted on 3/5/2024)  Please describe your symptoms.: A follow-up to check results of newly-prescribed Rosuvastatin  Have you had these symptoms before?: No  How long have you been having these symptoms?: Greater than 2 weeks  Please list any medications you are currently taking for this condition.: (above  Please describe any probable cause for these symptoms. : high cholesterol  Primary Reason for Visit (Submitted on 3/5/2024)  What is the primary reason for your visit?: Other      Review of Systems   Constitutional: Negative.    HENT: Negative.     Eyes: Negative.    Respiratory: Negative.     Cardiovascular: Negative.    Genitourinary: Negative.    Musculoskeletal:  Positive for back pain.   Skin: Negative.    Neurological: Negative.        Objective   Vitals:    03/12/24 0946   BP: 120/70   Pulse: 68   Resp: 16   SpO2: 98%   Weight: 55.1 kg (121 lb 8 oz)   Height: 156.2 cm (61.5\")      Body mass index is 22.59 kg/m².  BMI is within normal parameters. No other follow-up for BMI required.     Physical Exam  Vitals and nursing note reviewed.   Constitutional:       Appearance: She is well-developed.   HENT:      Head: Normocephalic and atraumatic.   Eyes:      Pupils: Pupils are equal, round, and reactive to light.   Cardiovascular:      Rate and Rhythm: Normal rate and regular rhythm.      Heart sounds: Normal heart sounds.   Pulmonary:      Effort: Pulmonary effort is normal.      Breath sounds: Normal breath sounds.   Musculoskeletal:      Cervical back: Normal range of motion " and neck supple.      Comments: L3 and L5    Skin:     General: Skin is warm and dry.   Neurological:      Mental Status: She is alert and oriented to person, place, and time.   Psychiatric:         Mood and Affect: Mood normal.         Behavior: Behavior normal.           Assessment & Plan   Problem List Items Addressed This Visit          Cardiac and Vasculature    Elevated cholesterol - Primary    Overview     She is tolerating Crestor well without problem and is anxious to see what her labs look like.  Labs will be drawn today.  She has no concerns.  She is taking Crestor 5 mg a day.         Relevant Orders    Lipid Panel With / Chol / HDL Ratio     I will let her know that labs show.     Orders Placed This Encounter   Procedures    Lipid Panel With / Chol / HDL Ratio        Return in about 3 months (around 6/12/2024).

## 2024-03-13 LAB
CHOLEST SERPL-MCNC: 206 MG/DL (ref 0–200)
CHOLEST/HDLC SERPL: 2.71 {RATIO}
HDLC SERPL-MCNC: 76 MG/DL (ref 40–60)
LDLC SERPL CALC-MCNC: 114 MG/DL (ref 0–100)
TRIGL SERPL-MCNC: 93 MG/DL (ref 0–150)
VLDLC SERPL CALC-MCNC: 16 MG/DL (ref 5–40)

## 2024-03-29 DIAGNOSIS — N28.1 RENAL CYST: Primary | ICD-10-CM

## 2024-05-24 RX ORDER — CYANOCOBALAMIN (VITAMIN B-12) 500 MCG
1 LOZENGE ORAL DAILY
COMMUNITY

## 2024-05-24 RX ORDER — VITAMIN B COMPLEX
1 CAPSULE ORAL DAILY
COMMUNITY

## 2024-05-28 ENCOUNTER — ANESTHESIA (OUTPATIENT)
Dept: GASTROENTEROLOGY | Facility: HOSPITAL | Age: 69
End: 2024-05-28
Payer: COMMERCIAL

## 2024-05-28 ENCOUNTER — HOSPITAL ENCOUNTER (OUTPATIENT)
Facility: HOSPITAL | Age: 69
Setting detail: HOSPITAL OUTPATIENT SURGERY
Discharge: HOME | End: 2024-05-28
Attending: INTERNAL MEDICINE | Admitting: INTERNAL MEDICINE
Payer: COMMERCIAL

## 2024-05-28 ENCOUNTER — ANESTHESIA EVENT (OUTPATIENT)
Dept: GASTROENTEROLOGY | Facility: HOSPITAL | Age: 69
End: 2024-05-28
Payer: COMMERCIAL

## 2024-05-28 VITALS
WEIGHT: 118.6 LBS | BODY MASS INDEX: 22.39 KG/M2 | DIASTOLIC BLOOD PRESSURE: 80 MMHG | RESPIRATION RATE: 14 BRPM | OXYGEN SATURATION: 99 % | SYSTOLIC BLOOD PRESSURE: 139 MMHG | HEART RATE: 63 BPM | HEIGHT: 61 IN

## 2024-05-28 DIAGNOSIS — Z86.010 HISTORY OF ADENOMATOUS POLYP OF COLON: ICD-10-CM

## 2024-05-28 PROCEDURE — 45380 COLONOSCOPY AND BIOPSY: CPT | Performed by: INTERNAL MEDICINE

## 2024-05-28 PROCEDURE — 25810000003 LACTATED RINGERS PER 1000 ML: Performed by: INTERNAL MEDICINE

## 2024-05-28 PROCEDURE — 25010000002 GLYCOPYRROLATE 0.2 MG/ML SOLUTION: Performed by: NURSE ANESTHETIST, CERTIFIED REGISTERED

## 2024-05-28 PROCEDURE — 88305 TISSUE EXAM BY PATHOLOGIST: CPT | Performed by: INTERNAL MEDICINE

## 2024-05-28 PROCEDURE — S0260 H&P FOR SURGERY: HCPCS | Performed by: INTERNAL MEDICINE

## 2024-05-28 PROCEDURE — 45385 COLONOSCOPY W/LESION REMOVAL: CPT | Performed by: INTERNAL MEDICINE

## 2024-05-28 PROCEDURE — 25010000002 PROPOFOL 200 MG/20ML EMULSION: Performed by: NURSE ANESTHETIST, CERTIFIED REGISTERED

## 2024-05-28 RX ORDER — LIDOCAINE HYDROCHLORIDE 20 MG/ML
INJECTION, SOLUTION INFILTRATION; PERINEURAL AS NEEDED
Status: DISCONTINUED | OUTPATIENT
Start: 2024-05-28 | End: 2024-05-28 | Stop reason: SURG

## 2024-05-28 RX ORDER — FLUMAZENIL 0.1 MG/ML
0.2 INJECTION INTRAVENOUS AS NEEDED
Status: DISCONTINUED | OUTPATIENT
Start: 2024-05-28 | End: 2024-05-28 | Stop reason: HOSPADM

## 2024-05-28 RX ORDER — GLYCOPYRROLATE 0.2 MG/ML
INJECTION INTRAMUSCULAR; INTRAVENOUS AS NEEDED
Status: DISCONTINUED | OUTPATIENT
Start: 2024-05-28 | End: 2024-05-28 | Stop reason: SURG

## 2024-05-28 RX ORDER — DROPERIDOL 2.5 MG/ML
0.62 INJECTION, SOLUTION INTRAMUSCULAR; INTRAVENOUS
Status: DISCONTINUED | OUTPATIENT
Start: 2024-05-28 | End: 2024-05-28 | Stop reason: HOSPADM

## 2024-05-28 RX ORDER — LABETALOL HYDROCHLORIDE 5 MG/ML
5 INJECTION, SOLUTION INTRAVENOUS
Status: DISCONTINUED | OUTPATIENT
Start: 2024-05-28 | End: 2024-05-28 | Stop reason: HOSPADM

## 2024-05-28 RX ORDER — HYDROCODONE BITARTRATE AND ACETAMINOPHEN 7.5; 325 MG/1; MG/1
1 TABLET ORAL EVERY 4 HOURS PRN
Status: DISCONTINUED | OUTPATIENT
Start: 2024-05-28 | End: 2024-05-28 | Stop reason: HOSPADM

## 2024-05-28 RX ORDER — FENTANYL CITRATE 50 UG/ML
25 INJECTION, SOLUTION INTRAMUSCULAR; INTRAVENOUS
Status: DISCONTINUED | OUTPATIENT
Start: 2024-05-28 | End: 2024-05-28 | Stop reason: HOSPADM

## 2024-05-28 RX ORDER — PROPOFOL 10 MG/ML
INJECTION, EMULSION INTRAVENOUS CONTINUOUS PRN
Status: DISCONTINUED | OUTPATIENT
Start: 2024-05-28 | End: 2024-05-28 | Stop reason: SURG

## 2024-05-28 RX ORDER — DIPHENHYDRAMINE HYDROCHLORIDE 50 MG/ML
12.5 INJECTION INTRAMUSCULAR; INTRAVENOUS
Status: DISCONTINUED | OUTPATIENT
Start: 2024-05-28 | End: 2024-05-28 | Stop reason: HOSPADM

## 2024-05-28 RX ORDER — PROMETHAZINE HYDROCHLORIDE 25 MG/1
25 TABLET ORAL ONCE AS NEEDED
Status: DISCONTINUED | OUTPATIENT
Start: 2024-05-28 | End: 2024-05-28 | Stop reason: HOSPADM

## 2024-05-28 RX ORDER — IPRATROPIUM BROMIDE AND ALBUTEROL SULFATE 2.5; .5 MG/3ML; MG/3ML
3 SOLUTION RESPIRATORY (INHALATION) ONCE AS NEEDED
Status: DISCONTINUED | OUTPATIENT
Start: 2024-05-28 | End: 2024-05-28 | Stop reason: HOSPADM

## 2024-05-28 RX ORDER — HYDROMORPHONE HYDROCHLORIDE 2 MG/ML
0.25 INJECTION, SOLUTION INTRAMUSCULAR; INTRAVENOUS; SUBCUTANEOUS
Status: DISCONTINUED | OUTPATIENT
Start: 2024-05-28 | End: 2024-05-28 | Stop reason: HOSPADM

## 2024-05-28 RX ORDER — NALOXONE HCL 0.4 MG/ML
0.2 VIAL (ML) INJECTION AS NEEDED
Status: DISCONTINUED | OUTPATIENT
Start: 2024-05-28 | End: 2024-05-28 | Stop reason: HOSPADM

## 2024-05-28 RX ORDER — PROMETHAZINE HYDROCHLORIDE 25 MG/1
25 SUPPOSITORY RECTAL ONCE AS NEEDED
Status: DISCONTINUED | OUTPATIENT
Start: 2024-05-28 | End: 2024-05-28 | Stop reason: HOSPADM

## 2024-05-28 RX ORDER — ONDANSETRON 2 MG/ML
4 INJECTION INTRAMUSCULAR; INTRAVENOUS ONCE AS NEEDED
Status: DISCONTINUED | OUTPATIENT
Start: 2024-05-28 | End: 2024-05-28 | Stop reason: HOSPADM

## 2024-05-28 RX ORDER — EPHEDRINE SULFATE 50 MG/ML
5 INJECTION, SOLUTION INTRAVENOUS ONCE AS NEEDED
Status: DISCONTINUED | OUTPATIENT
Start: 2024-05-28 | End: 2024-05-28 | Stop reason: HOSPADM

## 2024-05-28 RX ORDER — HYDROCODONE BITARTRATE AND ACETAMINOPHEN 5; 325 MG/1; MG/1
1 TABLET ORAL ONCE AS NEEDED
Status: DISCONTINUED | OUTPATIENT
Start: 2024-05-28 | End: 2024-05-28 | Stop reason: HOSPADM

## 2024-05-28 RX ORDER — HYDRALAZINE HYDROCHLORIDE 20 MG/ML
5 INJECTION INTRAMUSCULAR; INTRAVENOUS
Status: DISCONTINUED | OUTPATIENT
Start: 2024-05-28 | End: 2024-05-28 | Stop reason: HOSPADM

## 2024-05-28 RX ORDER — SODIUM CHLORIDE, SODIUM LACTATE, POTASSIUM CHLORIDE, CALCIUM CHLORIDE 600; 310; 30; 20 MG/100ML; MG/100ML; MG/100ML; MG/100ML
30 INJECTION, SOLUTION INTRAVENOUS CONTINUOUS
Status: DISCONTINUED | OUTPATIENT
Start: 2024-05-28 | End: 2024-05-28 | Stop reason: HOSPADM

## 2024-05-28 RX ADMIN — SODIUM CHLORIDE, POTASSIUM CHLORIDE, SODIUM LACTATE AND CALCIUM CHLORIDE 30 ML/HR: 600; 310; 30; 20 INJECTION, SOLUTION INTRAVENOUS at 12:43

## 2024-05-28 RX ADMIN — LIDOCAINE HYDROCHLORIDE 50 MG: 20 INJECTION, SOLUTION INFILTRATION; PERINEURAL at 12:51

## 2024-05-28 RX ADMIN — PROPOFOL 50 MG: 10 INJECTION, EMULSION INTRAVENOUS at 12:49

## 2024-05-28 RX ADMIN — GLYCOPYRROLATE 0.2 MG: 0.2 INJECTION INTRAMUSCULAR; INTRAVENOUS at 12:51

## 2024-05-28 RX ADMIN — PROPOFOL 160 MCG/KG/MIN: 10 INJECTION, EMULSION INTRAVENOUS at 12:47

## 2024-05-28 NOTE — DISCHARGE INSTRUCTIONS
For the next 24 hours patient needs to be with a responsible adult.    For 24 hours DO NOT drive, operate machinery, appliances, drink alcohol, make important decisions or sign legal documents.    Start with a light or bland diet if you are feeling sick to your stomach otherwise advance to regular diet as tolerated.    Follow recommendations on procedure report if provided by your doctor.    Call Dr Wild for problems [phone]    Problems may include but not limited to: large amounts of bleeding, trouble breathing, repeated vomiting, severe unrelieved pain, fever or chills.

## 2024-05-28 NOTE — ANESTHESIA POSTPROCEDURE EVALUATION
Patient: June Baca    Procedure Summary       Date: 05/28/24 Room / Location: [facility] ENDOSCOPY 1 / [facility] ENDOSCOPY    Anesthesia Start: 1246 Anesthesia Stop: 1313    Procedure: COLONOSCOPY to cecum with cold snare polypectomies and cold biopsies Diagnosis:       History of adenomatous polyp of colon      (History of adenomatous polyp of colon [Z86.010])    Surgeons: Neli Wild MD Provider: Everett Castellon MD    Anesthesia Type: MAC ASA Status: 2            Anesthesia Type: MAC    Vitals  Vitals Value Taken Time   /82 05/28/24 1315   Temp     Pulse 69 05/28/24 1320   Resp 14 05/28/24 1315   SpO2 98 % 05/28/24 1320   Vitals shown include unfiled device data.        Post Anesthesia Care and Evaluation    Patient location during evaluation: PACU  Patient participation: complete - patient participated  Level of consciousness: awake and alert  Pain management: adequate    Airway patency: patent  Anesthetic complications: No anesthetic complications    Cardiovascular status: acceptable  Respiratory status: acceptable  Hydration status: acceptable    Comments: --------------------            05/28/24               1315     --------------------   BP:       123/82     Pulse:      66       Resp:       14       SpO2:      99%      --------------------

## 2024-05-28 NOTE — ANESTHESIA PREPROCEDURE EVALUATION
Anesthesia Evaluation     Patient summary reviewed and Nursing notes reviewed                Airway   Mallampati: II  TM distance: <3 FB  Dental      Pulmonary - negative pulmonary ROS   Cardiovascular     Rhythm: regular  Rate: normal    (+) hyperlipidemia      Neuro/Psych  (+) numbness, psychiatric history Anxiety  GI/Hepatic/Renal/Endo - negative ROS     Musculoskeletal (-) negative ROS    Abdominal    Substance History - negative use     OB/GYN negative ob/gyn ROS         Other      history of cancer                Anesthesia Plan    ASA 2     MAC     intravenous induction     Anesthetic plan, risks, benefits, and alternatives have been provided, discussed and informed consent has been obtained with: patient.    CODE STATUS:

## 2024-05-28 NOTE — H&P
[facility]  Pre Procedure History & Physical    Chief Complaint:   H/o adenomatous polyps    Subjective     HPI:   69 yo here today for colonoscopy.  Pt reports no FH CRC/polyps.  Patient denies GI symptoms currently.  Last exam 2021    Past Medical History:   Past Medical History:   Diagnosis Date    Allergic 1965    seasonal, lifelong    COVID     Depression 03/1998    several months only    Drug therapy 03/12/2013    Endometriosis     History of colon polyps     Hx of radiation therapy 08/02/2013    Right breast    Inguinal hernia     Invasive ductal carcinoma of breast     RIGHT WITH METAPLASTIC FEATURES (SPINDLE CELL METAPLASIA) 1.5CM GRADE 2, ER WEAKLY POSITIVE, HI NEGATIVE, HER-2/SOLITARIO NEGATIVE    Low back pain 01/1991    usually mild, chiropractic maintenance    Osteopenia     Ovarian cyst     Peripheral neuropathy     RELATED TO TAXOL    Pneumonia 1965 & 1978    Postmenopausal     Uterine polyp        Past Surgical History:  Past Surgical History:   Procedure Laterality Date    BREAST BIOPSY      BREAST LUMPECTOMY Right 01/30/2013    BREAST LUMPECTOMY WITH SENTINEL NODE BIOPSY Right 01/30/2013    BREAST SURGERY  01/30/2013    lumpectomy    COLONOSCOPY  2009?    COLONOSCOPY N/A 04/06/2021    Procedure: COLONOSCOPY INTO CECUM AND T.I. WITH COLD SNARE POLYPECTOMIES AND COLD BIOPSY POLYPECTOMIES;  Surgeon: Neli Wild MD;  Location: [facility] ENDOSCOPY;  Service: Gastroenterology;  Laterality: N/A;  PRE- SCREENING  POST- DIVERTICULOSIS, POLYPS, HEMORRHOIDS    CYSTECTOMY      D & C AND LAPAROSCOPY      EYE SURGERY      HERNIA REPAIR  1987    LAPAROSCOPIC OVARIAN CYSTECTOMY      LYMPH NODE BIOPSY  01/30/2013    related to breast cancer    MEDIPORT INSERTION, SINGLE  02/27/2013    DR. MIRANDA       Family History:  Family History   Problem Relation Age of Onset    Diabetes Mother         type 2    Hypertension Father     Prostate cancer Father     Cancer Father         prostate, 12(?)  "yrs. healthy    Hearing loss Father         resulting from shingles    Heart disease Father         TAVR procedure 2016    Cancer Maternal Aunt         pancreatic,  2017 age 83    Cancer Maternal Aunt         stage 4 breast cancer, diagnosed 2017    Breast cancer Maternal Aunt     Malasael Hyperthermia Neg Hx        Social History:   reports that she has never smoked. She has never used smokeless tobacco. She reports current alcohol use of about 7.0 standard drinks of alcohol per week. She reports that she does not use drugs.    Medications:   Medications Prior to Admission   Medication Sig Dispense Refill Last Dose    B Complex Vitamins (vitamin b complex) capsule capsule Take 1 capsule by mouth Daily.       CALCIUM PO Take 1 tablet by mouth Daily.       CBD (cannabidiol) oral oil Daily.       Cholecalciferol (VITAMIN D) 1000 UNITS tablet Take 1 tablet by mouth.       Fluticasone Propionate (FLONASE ALLERGY RELIEF NA) 1 spray into the nostril(s) as directed by provider Daily.       levocetirizine (XYZAL) 5 MG tablet Take 1 tablet by mouth Daily.       MAGNESIUM PO Take 1 tablet by mouth Daily.       Multiple Vitamins-Minerals (MULTIVITAMIN ADULT PO) Take 1 tablet by mouth Daily.       rosuvastatin (Crestor) 5 MG tablet Take 1 tablet by mouth Daily. 90 tablet 1     Vitamin E 400 units tablet Take 1 tablet by mouth Daily.       ALLERGY SERUM INJECTION Inject  under the skin into the appropriate area as directed 1 (One) Time.          Allergies:  Latex    ROS:    Pertinent items are noted in HPI     Objective     Height 154.9 cm (61\"), weight 53.8 kg (118 lb 9.6 oz), not currently breastfeeding.    Physical Exam   Constitutional: Pt is oriented to person, place, and time and well-developed, well-nourished, and in no distress.   Mouth/Throat: Oropharynx is clear and moist.   Neck: Normal range of motion.   Cardiovascular: Normal rate, regular rhythm    Pulmonary/Chest: Effort normal    Abdominal: Soft. " Nontender  Skin: Skin is warm and dry.   Psychiatric: Mood, memory, affect and judgment normal.     Assessment & Plan     Diagnosis:  H/o adenomatous polyps    Anticipated Surgical Procedure:  colonoscopy    The risks, benefits, and alternatives of this procedure have been discussed with the patient or the responsible party- the patient understands and agrees to proceed.

## 2024-05-29 LAB
LAB AP CASE REPORT: NORMAL
PATH REPORT.FINAL DX SPEC: NORMAL
PATH REPORT.GROSS SPEC: NORMAL

## 2024-05-30 DIAGNOSIS — E78.00 ELEVATED CHOLESTEROL: ICD-10-CM

## 2024-05-30 RX ORDER — ROSUVASTATIN CALCIUM 5 MG/1
5 TABLET, COATED ORAL DAILY
Qty: 90 TABLET | Refills: 3 | Status: SHIPPED | OUTPATIENT
Start: 2024-05-30

## 2024-06-03 NOTE — PROGRESS NOTES
1 of The polyp(s) biopsies showed adenomatous change. This is not cancerous but is considered potentially precancerous. Follow-up colonoscopy in 5 years is advised.     The colon biopsy showed no inflammation.

## 2024-06-04 ENCOUNTER — TELEPHONE (OUTPATIENT)
Dept: GASTROENTEROLOGY | Facility: CLINIC | Age: 69
End: 2024-06-04
Payer: COMMERCIAL

## 2024-06-04 NOTE — TELEPHONE ENCOUNTER
It is noted that this pt has seen their message from Dr Wild    Hm and cs recall placed for 5/28/29

## 2024-06-04 NOTE — TELEPHONE ENCOUNTER
----- Message from Neli Wild sent at 6/3/2024  4:40 PM EDT -----  1 of The polyp(s) biopsies showed adenomatous change. This is not cancerous but is considered potentially precancerous. Follow-up colonoscopy in 5 years is advised.     The colon biopsy showed no inflammation.

## 2024-06-11 ENCOUNTER — OFFICE VISIT (OUTPATIENT)
Dept: FAMILY MEDICINE CLINIC | Facility: CLINIC | Age: 69
End: 2024-06-11
Payer: COMMERCIAL

## 2024-06-11 VITALS
HEART RATE: 74 BPM | WEIGHT: 120 LBS | HEIGHT: 61 IN | SYSTOLIC BLOOD PRESSURE: 120 MMHG | RESPIRATION RATE: 16 BRPM | OXYGEN SATURATION: 98 % | BODY MASS INDEX: 22.66 KG/M2 | DIASTOLIC BLOOD PRESSURE: 68 MMHG

## 2024-06-11 DIAGNOSIS — E78.5 HYPERLIPIDEMIA, UNSPECIFIED HYPERLIPIDEMIA TYPE: Primary | ICD-10-CM

## 2024-06-11 DIAGNOSIS — E78.00 ELEVATED CHOLESTEROL: ICD-10-CM

## 2024-06-11 PROCEDURE — 99213 OFFICE O/P EST LOW 20 MIN: CPT | Performed by: FAMILY MEDICINE

## 2024-06-11 NOTE — PROGRESS NOTES
"Subjective   June Baca is a 68 y.o. female.   Hyperlipidemia    History of Present Illness    Stormy is  here for a follow up on cholesterol.  She was started on medication in February and is due for labs today and is fasting.  She notes no  problems or concerns     Review of Systems   Constitutional: Negative.    HENT: Negative.     Eyes: Negative.    Respiratory: Negative.     Cardiovascular: Negative.    Genitourinary: Negative.    Musculoskeletal:  Positive for back pain.   Skin: Negative.    Neurological: Negative.        Objective   Vitals:    06/11/24 0956   BP: 120/68   Pulse: 74   Resp: 16   SpO2: 98%   Weight: 54.4 kg (120 lb)   Height: 154.9 cm (61\")      Body mass index is 22.67 kg/m².  BMI is within normal parameters. No other follow-up for BMI required.     Physical Exam  Vitals and nursing note reviewed.   Constitutional:       Appearance: She is well-developed.   HENT:      Head: Normocephalic and atraumatic.   Eyes:      Pupils: Pupils are equal, round, and reactive to light.   Cardiovascular:      Rate and Rhythm: Normal rate and regular rhythm.      Heart sounds: Normal heart sounds.   Pulmonary:      Effort: Pulmonary effort is normal.      Breath sounds: Normal breath sounds.   Musculoskeletal:      Cervical back: Normal range of motion and neck supple.      Comments: L3 and L5    Skin:     General: Skin is warm and dry.   Neurological:      Mental Status: She is alert and oriented to person, place, and time.   Psychiatric:         Mood and Affect: Mood normal.         Behavior: Behavior normal.           Assessment & Plan   Problem List Items Addressed This Visit          Cardiac and Vasculature    Elevated cholesterol    Overview     She is tolerating Crestor well without problem and is anxious to see what her labs look like.  Labs will be drawn today.  She has no concerns.  She is taking Crestor 5 mg a day.          Other Visit Diagnoses       Hyperlipidemia, unspecified " hyperlipidemia type    -  Primary    Relevant Orders    Lipid Panel With / Chol / HDL Ratio    Comprehensive metabolic panel    Lipid panel          She experienced a little back pain when getting down from the lab table and it resolved well.  This is a longstanding problem for her and she is working hard at taking care of herself.     Orders Placed This Encounter   Procedures    Lipid Panel With / Chol / HDL Ratio    Comprehensive metabolic panel    Lipid panel        Return in about 6 months (around 12/11/2024) for Recheck.

## 2024-06-12 LAB
CHOLEST SERPL-MCNC: 194 MG/DL (ref 0–200)
HDLC SERPL-MCNC: 69 MG/DL (ref 40–60)
LDLC SERPL CALC-MCNC: 107 MG/DL (ref 0–100)
TRIGL SERPL-MCNC: 103 MG/DL (ref 0–150)
VLDLC SERPL CALC-MCNC: 18 MG/DL (ref 5–40)

## 2024-06-18 ENCOUNTER — HOSPITAL ENCOUNTER (OUTPATIENT)
Dept: BONE DENSITY | Facility: HOSPITAL | Age: 69
Discharge: HOME | End: 2024-06-18
Admitting: FAMILY MEDICINE
Payer: COMMERCIAL

## 2024-06-18 DIAGNOSIS — Z78.0 POST-MENOPAUSAL: ICD-10-CM

## 2024-06-18 PROCEDURE — 77080 DXA BONE DENSITY AXIAL: CPT

## 2024-08-22 ENCOUNTER — APPOINTMENT (OUTPATIENT)
Dept: WOMENS IMAGING | Facility: HOSPITAL | Age: 69
End: 2024-08-22
Payer: COMMERCIAL

## 2024-08-22 PROCEDURE — 77067 SCR MAMMO BI INCL CAD: CPT | Performed by: RADIOLOGY

## 2024-08-22 PROCEDURE — 77063 BREAST TOMOSYNTHESIS BI: CPT | Performed by: RADIOLOGY

## 2024-08-27 DIAGNOSIS — R92.8 ABNORMAL MAMMOGRAM: Primary | ICD-10-CM

## 2024-09-03 ENCOUNTER — APPOINTMENT (OUTPATIENT)
Dept: WOMENS IMAGING | Facility: HOSPITAL | Age: 69
End: 2024-09-03
Payer: COMMERCIAL

## 2024-09-03 PROCEDURE — 77065 DX MAMMO INCL CAD UNI: CPT | Performed by: RADIOLOGY

## 2024-09-03 PROCEDURE — 76642 ULTRASOUND BREAST LIMITED: CPT | Performed by: RADIOLOGY

## 2024-09-03 PROCEDURE — G0279 TOMOSYNTHESIS, MAMMO: HCPCS | Performed by: RADIOLOGY

## 2024-10-21 ENCOUNTER — APPOINTMENT (OUTPATIENT)
Dept: CT IMAGING | Facility: HOSPITAL | Age: 69
End: 2024-10-21
Payer: COMMERCIAL

## 2024-10-21 ENCOUNTER — APPOINTMENT (OUTPATIENT)
Dept: MRI IMAGING | Facility: HOSPITAL | Age: 69
End: 2024-10-21
Payer: COMMERCIAL

## 2024-10-21 ENCOUNTER — HOSPITAL ENCOUNTER (OUTPATIENT)
Facility: HOSPITAL | Age: 69
Setting detail: OBSERVATION
Discharge: HOME | End: 2024-10-23
Attending: EMERGENCY MEDICINE | Admitting: EMERGENCY MEDICINE
Payer: COMMERCIAL

## 2024-10-21 ENCOUNTER — APPOINTMENT (OUTPATIENT)
Dept: GENERAL RADIOLOGY | Facility: HOSPITAL | Age: 69
End: 2024-10-21
Payer: COMMERCIAL

## 2024-10-21 DIAGNOSIS — R42 DIZZINESS: Primary | ICD-10-CM

## 2024-10-21 DIAGNOSIS — R26.81 GAIT INSTABILITY: ICD-10-CM

## 2024-10-21 DIAGNOSIS — Z09 FOLLOW-UP EXAM: ICD-10-CM

## 2024-10-21 LAB
ALBUMIN SERPL-MCNC: 4.3 G/DL (ref 3.5–5.2)
ALBUMIN/GLOB SERPL: 1.4 G/DL
ALP SERPL-CCNC: 85 U/L (ref 39–117)
ALT SERPL W P-5'-P-CCNC: 18 U/L (ref 1–33)
ANION GAP SERPL CALCULATED.3IONS-SCNC: 10.7 MMOL/L (ref 5–15)
AST SERPL-CCNC: 25 U/L (ref 1–32)
BASOPHILS # BLD AUTO: 0.04 10*3/MM3 (ref 0–0.2)
BASOPHILS NFR BLD AUTO: 0.4 % (ref 0–1.5)
BILIRUB SERPL-MCNC: 0.4 MG/DL (ref 0–1.2)
BILIRUB UR QL STRIP: NEGATIVE
BUN SERPL-MCNC: 14 MG/DL (ref 8–23)
BUN/CREAT SERPL: 15.4 (ref 7–25)
CALCIUM SPEC-SCNC: 9.7 MG/DL (ref 8.6–10.5)
CHLORIDE SERPL-SCNC: 103 MMOL/L (ref 98–107)
CLARITY UR: ABNORMAL
CO2 SERPL-SCNC: 25.3 MMOL/L (ref 22–29)
COLOR UR: YELLOW
CREAT SERPL-MCNC: 0.91 MG/DL (ref 0.57–1)
DEPRECATED RDW RBC AUTO: 43.1 FL (ref 37–54)
EGFRCR SERPLBLD CKD-EPI 2021: 69.3 ML/MIN/1.73
EOSINOPHIL # BLD AUTO: 0.02 10*3/MM3 (ref 0–0.4)
EOSINOPHIL NFR BLD AUTO: 0.2 % (ref 0.3–6.2)
ERYTHROCYTE [DISTWIDTH] IN BLOOD BY AUTOMATED COUNT: 12.2 % (ref 12.3–15.4)
GLOBULIN UR ELPH-MCNC: 3.1 GM/DL
GLUCOSE SERPL-MCNC: 120 MG/DL (ref 65–99)
GLUCOSE UR STRIP-MCNC: NEGATIVE MG/DL
HCT VFR BLD AUTO: 39.5 % (ref 34–46.6)
HGB BLD-MCNC: 12.7 G/DL (ref 12–15.9)
HGB UR QL STRIP.AUTO: NEGATIVE
IMM GRANULOCYTES # BLD AUTO: 0.05 10*3/MM3 (ref 0–0.05)
IMM GRANULOCYTES NFR BLD AUTO: 0.5 % (ref 0–0.5)
KETONES UR QL STRIP: NEGATIVE
LEUKOCYTE ESTERASE UR QL STRIP.AUTO: NEGATIVE
LYMPHOCYTES # BLD AUTO: 1.19 10*3/MM3 (ref 0.7–3.1)
LYMPHOCYTES NFR BLD AUTO: 11.7 % (ref 19.6–45.3)
MCH RBC QN AUTO: 31.1 PG (ref 26.6–33)
MCHC RBC AUTO-ENTMCNC: 32.2 G/DL (ref 31.5–35.7)
MCV RBC AUTO: 96.8 FL (ref 79–97)
MONOCYTES # BLD AUTO: 0.57 10*3/MM3 (ref 0.1–0.9)
MONOCYTES NFR BLD AUTO: 5.6 % (ref 5–12)
NEUTROPHILS NFR BLD AUTO: 8.29 10*3/MM3 (ref 1.7–7)
NEUTROPHILS NFR BLD AUTO: 81.6 % (ref 42.7–76)
NITRITE UR QL STRIP: NEGATIVE
NRBC BLD AUTO-RTO: 0 /100 WBC (ref 0–0.2)
PH UR STRIP.AUTO: >=9 [PH] (ref 5–8)
PLATELET # BLD AUTO: 251 10*3/MM3 (ref 140–450)
PMV BLD AUTO: 10.2 FL (ref 6–12)
POTASSIUM SERPL-SCNC: 3.7 MMOL/L (ref 3.5–5.2)
PROT SERPL-MCNC: 7.4 G/DL (ref 6–8.5)
PROT UR QL STRIP: ABNORMAL
QT INTERVAL: 439 MS
QTC INTERVAL: 432 MS
RBC # BLD AUTO: 4.08 10*6/MM3 (ref 3.77–5.28)
SODIUM SERPL-SCNC: 139 MMOL/L (ref 136–145)
SP GR UR STRIP: 1.02 (ref 1–1.03)
TROPONIN T SERPL HS-MCNC: <6 NG/L
UROBILINOGEN UR QL STRIP: ABNORMAL
WBC NRBC COR # BLD AUTO: 10.16 10*3/MM3 (ref 3.4–10.8)

## 2024-10-21 PROCEDURE — 70553 MRI BRAIN STEM W/O & W/DYE: CPT

## 2024-10-21 PROCEDURE — 25810000003 SODIUM CHLORIDE 0.9 % SOLUTION: Performed by: PHYSICIAN ASSISTANT

## 2024-10-21 PROCEDURE — 80053 COMPREHEN METABOLIC PANEL: CPT | Performed by: PHYSICIAN ASSISTANT

## 2024-10-21 PROCEDURE — 71045 X-RAY EXAM CHEST 1 VIEW: CPT

## 2024-10-21 PROCEDURE — G0378 HOSPITAL OBSERVATION PER HR: HCPCS

## 2024-10-21 PROCEDURE — 70496 CT ANGIOGRAPHY HEAD: CPT

## 2024-10-21 PROCEDURE — 85025 COMPLETE CBC W/AUTO DIFF WBC: CPT | Performed by: PHYSICIAN ASSISTANT

## 2024-10-21 PROCEDURE — 70498 CT ANGIOGRAPHY NECK: CPT

## 2024-10-21 PROCEDURE — 81003 URINALYSIS AUTO W/O SCOPE: CPT | Performed by: PHYSICIAN ASSISTANT

## 2024-10-21 PROCEDURE — 99285 EMERGENCY DEPT VISIT HI MDM: CPT

## 2024-10-21 PROCEDURE — 93005 ELECTROCARDIOGRAM TRACING: CPT | Performed by: PHYSICIAN ASSISTANT

## 2024-10-21 PROCEDURE — 70450 CT HEAD/BRAIN W/O DYE: CPT

## 2024-10-21 PROCEDURE — 25010000002 LORAZEPAM PER 2 MG: Performed by: EMERGENCY MEDICINE

## 2024-10-21 PROCEDURE — 0 GADOBENATE DIMEGLUMINE 529 MG/ML SOLUTION: Performed by: EMERGENCY MEDICINE

## 2024-10-21 PROCEDURE — 96374 THER/PROPH/DIAG INJ IV PUSH: CPT

## 2024-10-21 PROCEDURE — A9577 INJ MULTIHANCE: HCPCS | Performed by: EMERGENCY MEDICINE

## 2024-10-21 PROCEDURE — 93010 ELECTROCARDIOGRAM REPORT: CPT | Performed by: INTERNAL MEDICINE

## 2024-10-21 PROCEDURE — 84484 ASSAY OF TROPONIN QUANT: CPT | Performed by: PHYSICIAN ASSISTANT

## 2024-10-21 PROCEDURE — 25510000001 IOPAMIDOL PER 1 ML: Performed by: EMERGENCY MEDICINE

## 2024-10-21 RX ORDER — CETIRIZINE HYDROCHLORIDE 10 MG/1
10 TABLET ORAL DAILY
Status: DISCONTINUED | OUTPATIENT
Start: 2024-10-22 | End: 2024-10-23 | Stop reason: HOSPADM

## 2024-10-21 RX ORDER — BISACODYL 5 MG/1
5 TABLET, DELAYED RELEASE ORAL DAILY PRN
Status: DISCONTINUED | OUTPATIENT
Start: 2024-10-21 | End: 2024-10-23 | Stop reason: HOSPADM

## 2024-10-21 RX ORDER — CHOLECALCIFEROL (VITAMIN D3) 25 MCG
1000 TABLET ORAL DAILY
Status: DISCONTINUED | OUTPATIENT
Start: 2024-10-22 | End: 2024-10-23 | Stop reason: HOSPADM

## 2024-10-21 RX ORDER — MECLIZINE HYDROCHLORIDE 25 MG/1
25 TABLET ORAL ONCE
Status: COMPLETED | OUTPATIENT
Start: 2024-10-21 | End: 2024-10-21

## 2024-10-21 RX ORDER — SODIUM CHLORIDE 0.9 % (FLUSH) 0.9 %
10 SYRINGE (ML) INJECTION AS NEEDED
Status: DISCONTINUED | OUTPATIENT
Start: 2024-10-21 | End: 2024-10-23 | Stop reason: HOSPADM

## 2024-10-21 RX ORDER — ROSUVASTATIN CALCIUM 5 MG/1
5 TABLET, COATED ORAL DAILY
Status: DISCONTINUED | OUTPATIENT
Start: 2024-10-22 | End: 2024-10-23 | Stop reason: HOSPADM

## 2024-10-21 RX ORDER — ONDANSETRON 4 MG/1
4 TABLET, ORALLY DISINTEGRATING ORAL EVERY 6 HOURS PRN
Status: DISCONTINUED | OUTPATIENT
Start: 2024-10-21 | End: 2024-10-21 | Stop reason: SDUPTHER

## 2024-10-21 RX ORDER — AMOXICILLIN 250 MG
2 CAPSULE ORAL 2 TIMES DAILY PRN
Status: DISCONTINUED | OUTPATIENT
Start: 2024-10-21 | End: 2024-10-23 | Stop reason: HOSPADM

## 2024-10-21 RX ORDER — MULTIPLE VITAMINS W/ MINERALS TAB 9MG-400MCG
1 TAB ORAL DAILY
Status: DISCONTINUED | OUTPATIENT
Start: 2024-10-22 | End: 2024-10-23 | Stop reason: HOSPADM

## 2024-10-21 RX ORDER — ONDANSETRON 4 MG/1
4 TABLET, ORALLY DISINTEGRATING ORAL EVERY 6 HOURS PRN
Status: DISCONTINUED | OUTPATIENT
Start: 2024-10-21 | End: 2024-10-23 | Stop reason: HOSPADM

## 2024-10-21 RX ORDER — POLYETHYLENE GLYCOL 3350 17 G/17G
17 POWDER, FOR SOLUTION ORAL DAILY PRN
Status: DISCONTINUED | OUTPATIENT
Start: 2024-10-21 | End: 2024-10-23 | Stop reason: HOSPADM

## 2024-10-21 RX ORDER — SODIUM CHLORIDE 0.9 % (FLUSH) 0.9 %
10 SYRINGE (ML) INJECTION EVERY 12 HOURS SCHEDULED
Status: DISCONTINUED | OUTPATIENT
Start: 2024-10-21 | End: 2024-10-23 | Stop reason: HOSPADM

## 2024-10-21 RX ORDER — BISACODYL 10 MG
10 SUPPOSITORY, RECTAL RECTAL DAILY PRN
Status: DISCONTINUED | OUTPATIENT
Start: 2024-10-21 | End: 2024-10-23 | Stop reason: HOSPADM

## 2024-10-21 RX ORDER — LORAZEPAM 2 MG/ML
0.5 INJECTION INTRAMUSCULAR ONCE
Status: COMPLETED | OUTPATIENT
Start: 2024-10-21 | End: 2024-10-21

## 2024-10-21 RX ORDER — DIAZEPAM 5 MG
5 TABLET ORAL ONCE AS NEEDED
Status: DISCONTINUED | OUTPATIENT
Start: 2024-10-21 | End: 2024-10-23 | Stop reason: HOSPADM

## 2024-10-21 RX ORDER — FLUTICASONE PROPIONATE 50 UG/1
1 SPRAY, METERED NASAL DAILY
Status: DISCONTINUED | OUTPATIENT
Start: 2024-10-22 | End: 2024-10-23 | Stop reason: HOSPADM

## 2024-10-21 RX ORDER — ONDANSETRON 2 MG/ML
4 INJECTION INTRAMUSCULAR; INTRAVENOUS EVERY 6 HOURS PRN
Status: DISCONTINUED | OUTPATIENT
Start: 2024-10-21 | End: 2024-10-21 | Stop reason: SDUPTHER

## 2024-10-21 RX ORDER — IOPAMIDOL 755 MG/ML
100 INJECTION, SOLUTION INTRAVASCULAR
Status: COMPLETED | OUTPATIENT
Start: 2024-10-21 | End: 2024-10-21

## 2024-10-21 RX ORDER — ONDANSETRON 2 MG/ML
4 INJECTION INTRAMUSCULAR; INTRAVENOUS EVERY 6 HOURS PRN
Status: DISCONTINUED | OUTPATIENT
Start: 2024-10-21 | End: 2024-10-23 | Stop reason: HOSPADM

## 2024-10-21 RX ADMIN — Medication 10 ML: at 22:40

## 2024-10-21 RX ADMIN — MECLIZINE HYDROCHLORIDE 25 MG: 25 TABLET ORAL at 14:21

## 2024-10-21 RX ADMIN — IOPAMIDOL 95 ML: 755 INJECTION, SOLUTION INTRAVENOUS at 17:38

## 2024-10-21 RX ADMIN — GADOBENATE DIMEGLUMINE 11 ML: 529 INJECTION, SOLUTION INTRAVENOUS at 20:37

## 2024-10-21 RX ADMIN — LORAZEPAM 0.5 MG: 2 INJECTION INTRAMUSCULAR; INTRAVENOUS at 14:19

## 2024-10-21 RX ADMIN — SODIUM CHLORIDE 500 ML: 9 INJECTION, SOLUTION INTRAVENOUS at 14:25

## 2024-10-21 NOTE — ED NOTES
"Nursing report ED to floor  June Baca  69 y.o.  female    HPI :  HPI  Stated Reason for Visit: dizziness, nausea, vomiting  History Obtained From: EMS    Chief Complaint  Chief Complaint   Patient presents with    Dizziness    Nausea    Vomiting       Admitting doctor:   Candelario Vivas MD    Admitting diagnosis:   The primary encounter diagnosis was Dizziness. A diagnosis of Gait instability was also pertinent to this visit.    Code status:   Current Code Status       Date Active Code Status Order ID Comments User Context       10/21/2024 1639 CPR (Attempt to Resuscitate) [id number]  Kristine Santos APRN ED        Question Answer    Code Status (Patient has no pulse and is not breathing) CPR (Attempt to Resuscitate)    Medical Interventions (Patient has pulse or is breathing) Full Support    Level Of Support Discussed With Patient                    Allergies:   Latex    Isolation:   No active isolations    Intake and Output    Intake/Output Summary (Last 24 hours) at 10/21/2024 1730  Last data filed at 10/21/2024 1455  Gross per 24 hour   Intake 500 ml   Output --   Net 500 ml       Weight:       10/21/24  1218   Weight: 54.4 kg (119 lb 14.9 oz)       Most recent vitals:   Vitals:    10/21/24 1218 10/21/24 1231   BP: 136/77    Pulse: 58    Resp: 20    Temp:  97.8 °F (36.6 °C)   TempSrc:  Tympanic   SpO2: 99%    Weight: 54.4 kg (119 lb 14.9 oz)    Height: 154.9 cm (60.98\")        Active LDAs/IV Access:   Lines, Drains & Airways       Active LDAs       Name Placement date Placement time Site Days    Peripheral IV 10/21/24 1401 Anterior;Left Forearm 10/21/24  1401  Forearm  less than 1    Peripheral IV 10/21/24 1402 Anterior;Left;Upper Arm 10/21/24  1402  Arm  less than 1                    Labs (abnormal labs have a star):   Labs Reviewed   COMPREHENSIVE METABOLIC PANEL - Abnormal; Notable for the following components:       Result Value    Glucose 120 (*)     All other components within normal limits    " Narrative:     GFR Normal >60  Chronic Kidney Disease <60  Kidney Failure <15     URINALYSIS W/ MICROSCOPIC IF INDICATED (NO CULTURE) - Abnormal; Notable for the following components:    Appearance, UA Turbid (*)     pH, UA >=9.0 (*)     Protein, UA Trace (*)     All other components within normal limits    Narrative:     Urine microscopic not indicated.   CBC WITH AUTO DIFFERENTIAL - Abnormal; Notable for the following components:    RDW 12.2 (*)     Neutrophil % 81.6 (*)     Lymphocyte % 11.7 (*)     Eosinophil % 0.2 (*)     Neutrophils, Absolute 8.29 (*)     All other components within normal limits   SINGLE HS TROPONIN T - Normal    Narrative:     High Sensitive Troponin T Reference Range:  <14.0 ng/L- Negative Female for AMI  <22.0 ng/L- Negative Male for AMI  >=14 - Abnormal Female indicating possible myocardial injury.  >=22 - Abnormal Male indicating possible myocardial injury.   Clinicians would have to utilize clinical acumen, EKG, Troponin, and serial changes to determine if it is an Acute Myocardial Infarction or myocardial injury due to an underlying chronic condition.        CBC AND DIFFERENTIAL    Narrative:     The following orders were created for panel order CBC & Differential.  Procedure                               Abnormality         Status                     ---------                               -----------         ------                     CBC Auto Differential[[id number]]        Abnormal            Final result                 Please view results for these tests on the individual orders.       EKG:   ECG 12 Lead Other; Dizzy   Preliminary Result   HEART RATE=58  bpm   RR Twaegvrz=4380  ms   CA Otitzgfi=830  ms   P Horizontal Axis=16  deg   P Front Axis=59  deg   QRSD Interval=90  ms   QT Htlvcyqt=774  ms   QXlQ=769  ms   QRS Axis=52  deg   T Wave Axis=41  deg   - OTHERWISE NORMAL ECG -   Sinus rhythm   Abnormal R-wave progression, early transition   Minimal ST elevation, inferior leads    Baseline wander in lead(s) V6   Date and Time of Study:2024-10-21 13:32:04          Meds given in ED:   Medications   sodium chloride 0.9 % flush 10 mL (has no administration in time range)   sodium chloride 0.9 % flush 10 mL (has no administration in time range)   sodium chloride 0.9 % flush 10 mL (has no administration in time range)   sodium chloride 0.9 % flush 10 mL (has no administration in time range)   sennosides-docusate (PERICOLACE) 8.6-50 MG per tablet 2 tablet (has no administration in time range)     And   polyethylene glycol (MIRALAX) packet 17 g (has no administration in time range)     And   bisacodyl (DULCOLAX) EC tablet 5 mg (has no administration in time range)     And   bisacodyl (DULCOLAX) suppository 10 mg (has no administration in time range)   diazePAM (VALIUM) tablet 5 mg (has no administration in time range)   ondansetron ODT (ZOFRAN-ODT) disintegrating tablet 4 mg (has no administration in time range)     Or   ondansetron (ZOFRAN) injection 4 mg (has no administration in time range)   meclizine (ANTIVERT) tablet 25 mg (25 mg Oral Given 10/21/24 1421)   sodium chloride 0.9 % bolus 500 mL (0 mL Intravenous Stopped 10/21/24 1455)   LORazepam (ATIVAN) injection 0.5 mg (0.5 mg Intravenous Given 10/21/24 1419)       Imaging results:  XR Chest 1 View    Result Date: 10/21/2024  No evidence for acute pulmonary process. Follow-up as clinical indications persist.  This report was finalized on 10/21/2024 1:56 PM by Dr. Hugo Sweeney M.D on Workstation: [id number]       Ambulatory status:   - assist    Social issues:   Social History     Socioeconomic History    Marital status:      Spouse name: Drew    Years of education: College   Tobacco Use    Smoking status: Never    Smokeless tobacco: Never   Vaping Use    Vaping status: Never Used   Substance and Sexual Activity    Alcohol use: Yes     Alcohol/week: 7.0 standard drinks of alcohol     Types: 7 Shots of liquor per week     Comment:  1/night    Drug use: No    Sexual activity: Defer     Partners: Male     Birth control/protection: Post-menopausal, None     Comment: little or no intercourse last 5 yrs., PT (dilators) 2017       Peripheral Neurovascular  Peripheral Neurovascular (Adult)  Peripheral Neurovascular WDL: WDL    Neuro Cognitive  Neuro Cognitive (Adult)  Cognitive/Neuro/Behavioral WDL: WDL (dizziness x 4 hours)  Pupils  Pupil PERRLA: yes  Sergei Coma Scale  Best Eye Response: 4-->(E4) spontaneous  Best Motor Response: 6-->(M6) obeys commands  Best Verbal Response: 5-->(V5) oriented  [place] Coma Scale Score: 15    Learning  Learning Assessment  Learning Readiness and Ability: no barriers identified    Respiratory  Respiratory WDL  Respiratory WDL: WDL    Abdominal Pain       Pain Assessments  Pain (Adult)  (0-10) Pain Rating: Rest: 0    NIH Stroke Scale       Jemma Chang RN  10/21/24 17:30 EDT

## 2024-10-21 NOTE — H&P
[facility]   HISTORY AND PHYSICAL    Patient Name: June Baca  : 1955  MRN: [id number]  Primary Care Physician:  Jac, No Known  Date of admission: 10/21/2024    Subjective   Subjective     Chief Complaint:   Chief Complaint   Patient presents with    Dizziness    Nausea    Vomiting         HPI:    June Baca is a pleasant afebrile 69 y.o.  female with a past medical history of breast cancer depression.    She presents to the emergency department at [facility] today with complaint of dizziness.  She has been admitted to the ED observation unit for further testing and evaluation.    Patient states a week ago she developed room spinning like dizziness which resolved spontaneously.  States this morning she had similar abrupt onset of room spinning dizziness that was associated with nausea and vomiting.  She states every morning when she wakes up she has some mild bilateral blurry vision which clears but thought this may be worse today.  She does also endorse some left leg numbness but thought this was related to position.  She denies any focal weakness or vision changes at present.  She does endorse some left ear fullness recently and attributed to allergies and took a Sudafed yesterday without relief.    In regards to her dizziness she reports the keeping her eyes closed or only opening 1 eye at a time seems to improve it.  Holding her head still does also seem to make it feel better.  She states she has not had no recent changes in her medications except for being started on a statin about 6 months ago.    She took a COVID test yesterday which was negative.      Review of Systems   All systems were reviewed and negative except for: What is mentioned above    Personal History     Past Medical History:   Diagnosis Date    Allergic 1965    seasonal, lifelong    COVID     Depression 1998    several months only    Drug therapy 2013    Endometriosis      History of colon polyps     Hx of radiation therapy 08/02/2013    Right breast    Inguinal hernia     Invasive ductal carcinoma of breast     RIGHT WITH METAPLASTIC FEATURES (SPINDLE CELL METAPLASIA) 1.5CM GRADE 2, ER WEAKLY POSITIVE, ND NEGATIVE, HER-2/SOLITARIO NEGATIVE    Low back pain 01/1991    usually mild, chiropractic maintenance    Osteopenia     Ovarian cyst     Peripheral neuropathy     RELATED TO TAXOL    Pneumonia 1965 & 1978    Postmenopausal     Uterine polyp        Past Surgical History:   Procedure Laterality Date    BREAST BIOPSY      BREAST LUMPECTOMY Right 01/30/2013    BREAST LUMPECTOMY WITH SENTINEL NODE BIOPSY Right 01/30/2013    BREAST SURGERY  01/30/2013    lumpectomy    COLONOSCOPY  2009?    COLONOSCOPY N/A 04/06/2021    Procedure: COLONOSCOPY INTO CECUM AND T.I. WITH COLD SNARE POLYPECTOMIES AND COLD BIOPSY POLYPECTOMIES;  Surgeon: Neli Wild MD;  [place] [facility];  Service: Gastroenterology;  Laterality: N/A;  PRE- SCREENING  POST- DIVERTICULOSIS, POLYPS, HEMORRHOIDS    COLONOSCOPY N/A 5/28/2024    Procedure: COLONOSCOPY to cecum with cold snare polypectomies and cold biopsies;  Surgeon: Neli Wild MD;  Location[place] [facility];  Service: Gastroenterology;  Laterality: N/A;  pre: hx of polyps  post:  polyps, abnormal mucosa, hemorrhoids, diverticulosis    CYSTECTOMY      D & C AND LAPAROSCOPY      EYE SURGERY      HERNIA REPAIR  1987    LAPAROSCOPIC OVARIAN CYSTECTOMY      LYMPH NODE BIOPSY  01/30/2013    related to breast cancer    MEDIPORT INSERTION, SINGLE  02/27/2013    DR. MIRANDA       Family History: family history includes Breast cancer in her maternal aunt; Cancer in her father, maternal aunt, and maternal aunt; Diabetes in her mother; Hearing loss in her father; Heart disease in her father; Hypertension in her father; Prostate cancer in her father. Otherwise pertinent FHx was reviewed and not pertinent to current issue.    Social History:  reports that she  has never smoked. She has never used smokeless tobacco. She reports current alcohol use of about 7.0 standard drinks of alcohol per week. She reports that she does not use drugs.    Home Medications:  ALLERGY SERUM INJECTION, CBD, Calcium, Fluticasone Propionate, Magnesium, Vitamin E, cholecalciferol, levocetirizine, multivitamin with minerals, rosuvastatin, and vitamin b complex    Allergies:  Allergies   Allergen Reactions    Latex Rash       Objective   Objective     Vitals:   Temp:  [97.8 °F (36.6 °C)] 97.8 °F (36.6 °C)  Heart Rate:  [58] 58  Resp:  [20] 20  BP: (136)/(77) 136/77  Physical Exam    Constitutional: Awake, alert   Eyes: PERRLA, sclerae anicteric, no conjunctival injection   HENT: NCAT, mucous membranes moist   Neck: Supple, no thyromegaly, no lymphadenopathy, trachea midline   Respiratory: Clear to auscultation bilaterally, nonlabored respirations    Cardiovascular: RRR, no murmurs, rubs, or gallops, palpable pedal pulses bilaterally   Gastrointestinal: Positive bowel sounds, soft, nontender, nondistended   Musculoskeletal: No bilateral ankle edema, no clubbing or cyanosis to extremities   Psychiatric: Appropriate affect, cooperative   Neurologic: Oriented x 3, strength symmetric in all extremities, Cranial Nerves grossly intact to confrontation, speech clear   Skin: No rashes     Result Review    Result Review:  I have personally reviewed the results from the time of this admission to 10/21/2024 16:46 EDT and agree with these findings:  [x]  Laboratory list / accordion  []  Microbiology  [x]  Radiology  [x]  EKG/Telemetry   []  Cardiology/Vascular   []  Pathology  []  Old records  []  Other:  Most notable findings include: High-sensitivity troponin less than 6, chest x-ray clear, CT head negative for acute findings, urinalysis shows trace protein but no infectious process      Assessment & Plan   Assessment / Plan     Brief Patient Summary:  June Baca is a 69 y.o. female who is being  evaluated for dizziness.  Given her history of breast cancer I think it is reasonable to check an MRI of her brain with and without contrast to rule out malignancy versus vestibular neuroma she does endorse some left ear fullness.  She has some vague symptoms of left leg numbness and visual changes which she cannot explain by position and normal blurry vision first thing in the morning.  She seems like a very reasonable person I think it is probably warranted to evaluate her vessels to rule out vascular disease as well as a MRI of her brain to rule out malignancy versus acute CVA.  Patient is agreeable to plan    Active Hospital Problems:  Active Hospital Problems    Diagnosis     **Dizziness      Plan:     Dizziness  CT head negative for acute findings  Check orthostatic vital signs  CTA head and neck pending  MRI brain with and without contrast and IAC protocol pending  Consult to neurology team  Consult to physical therapy      VTE Prophylaxis:  Mechanical VTE prophylaxis orders are present.        CODE STATUS:    Level Of Support Discussed With: Patient  Code Status (Patient has no pulse and is not breathing): CPR (Attempt to Resuscitate)  Medical Interventions (Patient has pulse or is breathing): Full Support    Admission Status:  I believe this patient meets observation status.    Electronically signed by RINA Greene, 10/21/24, 4:46 PM EDT.      84 minutes has been spent by [facility] providers in the care of this patient while under observation status      I have worn appropriate PPE during this patient encounter, sanitized my hands both with entering and exiting patient's room.

## 2024-10-21 NOTE — PROGRESS NOTES
MD ATTESTATION NOTE    The DEONTE and I have discussed this patient's history, physical exam, and treatment plan.  I have reviewed the documentation and personally had a face to face interaction with the patient. I affirm the documentation and agree with the treatment and plan.  The attached note describes my personal findings.      I provided a substantive portion of the care of the patient.  I personally performed the physical exam in its entirety, and below are my findings.        Brief HPI: This patient is a 69-year-old female being admitted to the observation unit today for further evaluation of dizziness.  She reports that she woke up this morning but shortly after attempted to walk when she had sudden onset of dizziness that she describes as a room spinning sensation with associated nausea and vomiting.  She states that head movement and body position changes make the symptoms significantly worse.  Currently, she reports that she is feeling somewhat better though still mildly dizzy after ED treatment.      PHYSICAL EXAM  ED Triage Vitals   Temp Heart Rate Resp BP SpO2   10/21/24 1231 10/21/24 1218 10/21/24 1218 10/21/24 1218 10/21/24 1218   97.8 °F (36.6 °C) 58 20 136/77 99 %      Temp src Heart Rate Source Patient Position BP Location FiO2 (%)   10/21/24 1231 10/21/24 1837 10/21/24 1837 10/21/24 1837 --   Tympanic Monitor Lying Left arm          GENERAL: Resting comfortably and in no acute distress, nontoxic in appearance  HENT: nares patent  EYES: no scleral icterus  CV: regular rhythm, normal rate, no M/R/G  RESPIRATORY: normal effort, lungs clear bilaterally  ABDOMEN: soft, nontender, no rebound or guarding  MUSCULOSKELETAL: no deformity, no edema  NEURO: alert, moves all extremities, follows commands  PSYCH:  calm, cooperative  SKIN: warm, dry    Vital signs and nursing notes reviewed.        Plan: The patient's EKG is nonischemic, labs including cardiac enzymes unremarkable, and head CT unremarkable.  Will  we will monitor the patient as we obtain CTA head and neck as well as an MRI of the brain for further evaluation.  We will asked neurology and physical therapy to see in morning consultation.  Further planning to follow.

## 2024-10-21 NOTE — PLAN OF CARE
Goal Outcome Evaluation: Admitted today with dizziness, nausea, vomiting.  Still experiencing dizziness since arriving to the unit.  No pain or shortness of breath.  Vital signs stable.  MRI paperwork faxed.  Assist x1 to the restroom.  Neurology consulted for the AM

## 2024-10-21 NOTE — ED NOTES
IV attempt per this RN unsuccessful. Pt is limb restricted on the right side, is a known difficult stick. IV team paged, charge RN aware.

## 2024-10-21 NOTE — ED PROVIDER NOTES
EMERGENCY DEPARTMENT ENCOUNTER      Room Number:  43/43  PCP: Provider, No Known  Independent Historians: Patient  Patient Care Team:  Provider, No Known as PCP - Jamia Rain MD as Consulting Physician (Hematology and Oncology)  Faiza Arita MD as Referring Physician (Breast Surgery)       HPI:  Chief Complaint: Dizziness    A complete HPI/ROS/PMH/PSH/SH/FH are unobtainable due to: None    Chronic or social conditions impacting patient care (Social Determinants of Health): None      Context: June Baca is a 69 y.o. female with a PMH significant for depression, peripheral neuropathy, osteopenia who presents to the ED c/o acute dizziness.  The patient reports an episode of room spinning dizziness last Tuesday that lasted for several minutes before resolving spontaneously.  She was somewhat nauseous at that time but did not vomit.  She had recurrence of symptoms this morning when she woke up around 9 AM.  She felt somewhat dizzy initially but after taking a hot shower her symptoms significantly increased.  This led to further dizziness with the associated nausea and a few episodes of vomiting.  She continues with some nausea and dizziness at this time.  She does have worsening symptoms with position change and movement of the head.  Symptoms alleviated with the eyes closed.  No numbness or weakness of the face or extremities, slurred speech, visual disturbance, photophobia.      Upon review of prior external notes (non-ED) -and- Review of prior external test results outside of this encounter it appears the patient was evaluated in the office with OB/GYN for well woman exam on September 5, 2024.  The patient had a normal CBC and CMP on 1/11/2023.      PAST MEDICAL HISTORY  Active Ambulatory Problems     Diagnosis Date Noted    Malignant neoplasm of female breast 07/25/2016    Fat necrosis 09/24/2019    Encounter for screening for malignant neoplasm of colon 01/14/2021    Elevated  cholesterol 01/12/2024    History of adenomatous polyp of colon 02/20/2024     Resolved Ambulatory Problems     Diagnosis Date Noted    No Resolved Ambulatory Problems     Past Medical History:   Diagnosis Date    Allergic 1965    COVID     Depression 03/1998    Drug therapy 03/12/2013    Endometriosis     History of colon polyps     Hx of radiation therapy 08/02/2013    Inguinal hernia     Invasive ductal carcinoma of breast     Low back pain 01/1991    Osteopenia     Ovarian cyst     Peripheral neuropathy     Pneumonia 1965 & 1978    Postmenopausal     Uterine polyp          PAST SURGICAL HISTORY  Past Surgical History:   Procedure Laterality Date    BREAST BIOPSY      BREAST LUMPECTOMY Right 01/30/2013    BREAST LUMPECTOMY WITH SENTINEL NODE BIOPSY Right 01/30/2013    BREAST SURGERY  01/30/2013    lumpectomy    COLONOSCOPY  2009?    COLONOSCOPY N/A 04/06/2021    Procedure: COLONOSCOPY INTO CECUM AND T.I. WITH COLD SNARE POLYPECTOMIES AND COLD BIOPSY POLYPECTOMIES;  Surgeon: Neli Wild MD;  Location: [facility] ENDOSCOPY;  Service: Gastroenterology;  Laterality: N/A;  PRE- SCREENING  POST- DIVERTICULOSIS, POLYPS, HEMORRHOIDS    COLONOSCOPY N/A 5/28/2024    Procedure: COLONOSCOPY to cecum with cold snare polypectomies and cold biopsies;  Surgeon: Neli Wild MD;  Location: [facility] ENDOSCOPY;  Service: Gastroenterology;  Laterality: N/A;  pre: hx of polyps  post:  polyps, abnormal mucosa, hemorrhoids, diverticulosis    CYSTECTOMY      D & C AND LAPAROSCOPY      EYE SURGERY      HERNIA REPAIR  1987    LAPAROSCOPIC OVARIAN CYSTECTOMY      LYMPH NODE BIOPSY  01/30/2013    related to breast cancer    MEDIPORT INSERTION, SINGLE  02/27/2013    DR. MIRANDA         FAMILY HISTORY  Family History   Problem Relation Age of Onset    Diabetes Mother         type 2    Hypertension Father     Prostate cancer Father     Cancer Father         prostate, 12(?) yrs. healthy    Hearing loss Father         resulting from  shingles    Heart disease Father         TAVR procedure 2016    Cancer Maternal Aunt         pancreatic,  2017 age 83    Cancer Maternal Aunt         stage 4 breast cancer, diagnosed 2017    Breast cancer Maternal Aunt     Malig Hyperthermia Neg Hx          SOCIAL HISTORY  Social History     Socioeconomic History    Marital status:      Spouse name: Drew    Years of education: College   Tobacco Use    Smoking status: Never    Smokeless tobacco: Never   Vaping Use    Vaping status: Never Used   Substance and Sexual Activity    Alcohol use: Yes     Alcohol/week: 7.0 standard drinks of alcohol     Types: 7 Shots of liquor per week     Comment: 1/night    Drug use: No    Sexual activity: Defer     Partners: Male     Birth control/protection: Post-menopausal, None     Comment: little or no intercourse last 5 yrs., PT (dilators) 2017         ALLERGIES  Latex      REVIEW OF SYSTEMS  Included in HPI  All systems reviewed and negative except for those discussed in HPI.      PHYSICAL EXAM    I have reviewed the triage vital signs and nursing notes.    ED Triage Vitals [10/21/24 1218]   Temp Heart Rate Resp BP SpO2   -- 58 20 136/77 99 %      Temp src Heart Rate Source Patient Position BP Location FiO2 (%)   -- -- -- -- --       Physical Exam  Constitutional:       General: She is not in acute distress.     Appearance: She is well-developed.   HENT:      Head: Normocephalic and atraumatic.   Eyes:      General: No scleral icterus.     Extraocular Movements:      Right eye: Nystagmus present.      Left eye: Nystagmus present.      Conjunctiva/sclera: Conjunctivae normal.      Comments: Bilateral horizontal nystagmus is mild on exam.   Neck:      Trachea: No tracheal deviation.   Cardiovascular:      Rate and Rhythm: Normal rate and regular rhythm.   Pulmonary:      Effort: Pulmonary effort is normal.      Breath sounds: Normal breath sounds.   Abdominal:      Palpations: Abdomen is soft.      Tenderness:  There is no abdominal tenderness.   Musculoskeletal:         General: No deformity.      Cervical back: Normal range of motion.   Lymphadenopathy:      Cervical: No cervical adenopathy.   Skin:     General: Skin is warm and dry.   Neurological:      Mental Status: She is alert and oriented to person, place, and time.      Comments: Cranial nerves II-XII are intact.  Sensation is intact and symmetric throughout, no deficit.  Motor function is 5/5 and symmetric in the extremities x 4.  There is no facial droop, aphasia, dysarthria, speech is clear and coherent.  Normal FTN.   Psychiatric:         Behavior: Behavior normal.         Vital signs and nursing notes reviewed.      PPE: I wore a surgical mask throughout my encounters with the pt. I performed hand hygiene on entry into the pt room and upon exit.     LAB RESULTS  Recent Results (from the past 24 hours)   ECG 12 Lead Other; Dizzy    Collection Time: 10/21/24  1:32 PM   Result Value Ref Range    QT Interval 439 ms    QTC Interval 432 ms   Comprehensive Metabolic Panel    Collection Time: 10/21/24  2:01 PM    Specimen: Blood   Result Value Ref Range    Glucose 120 (H) 65 - 99 mg/dL    BUN 14 8 - 23 mg/dL    Creatinine 0.91 0.57 - 1.00 mg/dL    Sodium 139 136 - 145 mmol/L    Potassium 3.7 3.5 - 5.2 mmol/L    Chloride 103 98 - 107 mmol/L    CO2 25.3 22.0 - 29.0 mmol/L    Calcium 9.7 8.6 - 10.5 mg/dL    Total Protein 7.4 6.0 - 8.5 g/dL    Albumin 4.3 3.5 - 5.2 g/dL    ALT (SGPT) 18 1 - 33 U/L    AST (SGOT) 25 1 - 32 U/L    Alkaline Phosphatase 85 39 - 117 U/L    Total Bilirubin 0.4 0.0 - 1.2 mg/dL    Globulin 3.1 gm/dL    A/G Ratio 1.4 g/dL    BUN/Creatinine Ratio 15.4 7.0 - 25.0    Anion Gap 10.7 5.0 - 15.0 mmol/L    eGFR 69.3 >60.0 mL/min/1.73   Single High Sensitivity Troponin T    Collection Time: 10/21/24  2:01 PM    Specimen: Blood   Result Value Ref Range    HS Troponin T <6 <14 ng/L   CBC Auto Differential    Collection Time: 10/21/24  2:01 PM    Specimen:  Blood   Result Value Ref Range    WBC 10.16 3.40 - 10.80 10*3/mm3    RBC 4.08 3.77 - 5.28 10*6/mm3    Hemoglobin 12.7 12.0 - 15.9 g/dL    Hematocrit 39.5 34.0 - 46.6 %    MCV 96.8 79.0 - 97.0 fL    MCH 31.1 26.6 - 33.0 pg    MCHC 32.2 31.5 - 35.7 g/dL    RDW 12.2 (L) 12.3 - 15.4 %    RDW-SD 43.1 37.0 - 54.0 fl    MPV 10.2 6.0 - 12.0 fL    Platelets 251 140 - 450 10*3/mm3    Neutrophil % 81.6 (H) 42.7 - 76.0 %    Lymphocyte % 11.7 (L) 19.6 - 45.3 %    Monocyte % 5.6 5.0 - 12.0 %    Eosinophil % 0.2 (L) 0.3 - 6.2 %    Basophil % 0.4 0.0 - 1.5 %    Immature Grans % 0.5 0.0 - 0.5 %    Neutrophils, Absolute 8.29 (H) 1.70 - 7.00 10*3/mm3    Lymphocytes, Absolute 1.19 0.70 - 3.10 10*3/mm3    Monocytes, Absolute 0.57 0.10 - 0.90 10*3/mm3    Eosinophils, Absolute 0.02 0.00 - 0.40 10*3/mm3    Basophils, Absolute 0.04 0.00 - 0.20 10*3/mm3    Immature Grans, Absolute 0.05 0.00 - 0.05 10*3/mm3    nRBC 0.0 0.0 - 0.2 /100 WBC   Urinalysis With Microscopic If Indicated (No Culture) - Urine, Clean Catch    Collection Time: 10/21/24  2:42 PM    Specimen: Urine, Clean Catch   Result Value Ref Range    Color, UA Yellow Yellow, Straw    Appearance, UA Turbid (A) Clear    pH, UA >=9.0 (H) 5.0 - 8.0    Specific Gravity, UA 1.016 1.005 - 1.030    Glucose, UA Negative Negative    Ketones, UA Negative Negative    Bilirubin, UA Negative Negative    Blood, UA Negative Negative    Protein, UA Trace (A) Negative    Leuk Esterase, UA Negative Negative    Nitrite, UA Negative Negative    Urobilinogen, UA 0.2 E.U./dL 0.2 - 1.0 E.U./dL         RADIOLOGY  CT Head Without Contrast    Result Date: 10/21/2024  CT HEAD WO CONTRAST-  HISTORY:  Dizzy  COMPARISON: None  FINDINGS: The brain and ventricles are symmetrical. There is no evidence of hemorrhage, hydrocephalus or of acute infarction. Further evaluation could be performed with a MRI examination of the brain as indicated.      Radiation dose reduction techniques were utilized, including automated  exposure modulation based on body size.  This report was finalized on 10/21/2024 2:46 PM by Dr. Burt Su M.D on Workstation: [id number]      XR Chest 1 View    Result Date: 10/21/2024  XR CHEST 1 VW-  HISTORY: Female who is 69 years-old, dizziness  TECHNIQUE: Frontal views of the chest  COMPARISON: None available  FINDINGS: Heart, mediastinum and pulmonary vasculature are unremarkable. No focal pulmonary consolidation, pleural effusion, or pneumothorax. No acute osseous process.      No evidence for acute pulmonary process. Follow-up as clinical indications persist.  This report was finalized on 10/21/2024 1:56 PM by Dr. Hugo Sweeney M.D on Workstation: [id number]         MEDICATIONS GIVEN IN ER  Medications   sodium chloride 0.9 % flush 10 mL (has no administration in time range)   sodium chloride 0.9 % flush 10 mL (has no administration in time range)   meclizine (ANTIVERT) tablet 25 mg (25 mg Oral Given 10/21/24 1421)   sodium chloride 0.9 % bolus 500 mL (0 mL Intravenous Stopped 10/21/24 1455)   LORazepam (ATIVAN) injection 0.5 mg (0.5 mg Intravenous Given 10/21/24 1419)         ORDERS PLACED DURING THIS VISIT:  Orders Placed This Encounter   Procedures    CT Head Without Contrast    XR Chest 1 View    Comprehensive Metabolic Panel    Single High Sensitivity Troponin T    Urinalysis With Microscopic If Indicated (No Culture) - Urine, Clean Catch    CBC Auto Differential    ECG 12 Lead Other; Dizzy    Insert Peripheral IV    Insert Peripheral IV    Initiate ED Observation Status    CBC & Differential         OUTPATIENT MEDICATION MANAGEMENT:  Current Facility-Administered Medications Ordered in Epic   Medication Dose Route Frequency Provider Last Rate Last Admin    sodium chloride 0.9 % flush 10 mL  10 mL Intravenous PRN Juan Cole PA        sodium chloride 0.9 % flush 10 mL  10 mL Intravenous PRN Juan Cole PA         Current Outpatient Medications Ordered in Epic   Medication Sig  Dispense Refill    ALLERGY SERUM INJECTION Inject  under the skin into the appropriate area as directed 1 (One) Time.      B Complex Vitamins (vitamin b complex) capsule capsule Take 1 capsule by mouth Daily.      CALCIUM PO Take 1 tablet by mouth Daily.      CBD (cannabidiol) oral oil Daily.      Cholecalciferol (VITAMIN D) 1000 UNITS tablet Take 1 tablet by mouth.      Fluticasone Propionate (FLONASE ALLERGY RELIEF NA) 1 spray into the nostril(s) as directed by provider Daily.      levocetirizine (XYZAL) 5 MG tablet Take 1 tablet by mouth Daily.      MAGNESIUM PO Take 1 tablet by mouth Daily.      Multiple Vitamins-Minerals (MULTIVITAMIN ADULT PO) Take 1 tablet by mouth Daily.      rosuvastatin (CRESTOR) 5 MG tablet TAKE 1 TABLET DAILY 90 tablet 3    Vitamin E 400 units tablet Take 1 tablet by mouth Daily.               PROGRESS, DATA ANALYSIS, CONSULTS, AND MEDICAL DECISION MAKING  All labs have been independently interpreted by me.  All radiology studies have been reviewed by me. All EKG's have been independently viewed and interpreted by me.  Discussion below represents my analysis of pertinent findings related to patient's condition, differential diagnosis, treatment plan and final disposition.    Patient presentation and evaluation most consistent with acute dizziness from uncertain etiology.  Her symptoms certainly are highly indicative of peripheral vertigo with positional changes and horizontal nystagmus.  Her symptoms have improved with the use of a benzodiazepine low-dose and meclizine but she continues with some dizziness and gait instability.  I feel she would benefit from observation admission for neurology consult and possible MRI.  Patient agreeable and all questions answered.    DIFFERENTIAL DIAGNOSIS INCLUDE BUT NOT LIMITED TO:     Stroke, TIA, vertigo    Clinical Scores: N/A      ED Course as of 10/21/24 1639   Mon Oct 21, 2024   1638 I discussed the case with DEONTE Carmona with [facility] at this time  regarding the patient.  I discussed work-up, results, concerns.  I discussed the consulting provider's desire for obs admit.    [DC]      ED Course User Index  [DC] Juan Cole, PA          1640 I rechecked the patient.  I discussed the patient's labs, radiology findings (including all incidental findings), diagnosis, and plan for admission. The patient understands and agrees with the plan.      AS OF 16:39 EDT VITALS:    BP - 136/77  HR - 58  TEMP - 97.8 °F (36.6 °C) (Tympanic)  O2 SATS - 99%    COMPLEXITY OF CARE  The patient requires admission.      DIAGNOSIS  Final diagnoses:   Dizziness   Gait instability         DISPOSITION  ED Disposition       ED Disposition   Decision to Admit    Condition   --    Comment   --                Please note that portions of this document were completed with a voice recognition program.    Note Disclaimer: At [facility], we believe that sharing information builds trust and better relationships. You are receiving this note because you recently visited [facility]. It is possible you will see health information before a provider has talked with you about it. This kind of information can be easy to misunderstand. To help you fully understand what it means for your health, we urge you to discuss this note with your provider.         Juan Cole PA  10/21/24 1640       Juan Cole PA  10/22/24 0578

## 2024-10-21 NOTE — ED NOTES
Patient arrives via [facility] from home for complaints of dizziness that started around 0900. Patient has had nausea and vomiting. Alert and oriented x4. Patient restricted on the DOMONIQUE.

## 2024-10-22 ENCOUNTER — APPOINTMENT (OUTPATIENT)
Dept: MRI IMAGING | Facility: HOSPITAL | Age: 69
End: 2024-10-22
Payer: COMMERCIAL

## 2024-10-22 PROBLEM — M48.02 CERVICAL STENOSIS OF SPINE: Status: ACTIVE | Noted: 2024-10-22

## 2024-10-22 LAB
ALBUMIN SERPL-MCNC: 4 G/DL (ref 3.5–5.2)
ALBUMIN/GLOB SERPL: 1.3 G/DL
ALP SERPL-CCNC: 77 U/L (ref 39–117)
ALT SERPL W P-5'-P-CCNC: 17 U/L (ref 1–33)
ANION GAP SERPL CALCULATED.3IONS-SCNC: 10.8 MMOL/L (ref 5–15)
AST SERPL-CCNC: 24 U/L (ref 1–32)
BASOPHILS # BLD AUTO: 0.04 10*3/MM3 (ref 0–0.2)
BASOPHILS NFR BLD AUTO: 0.5 % (ref 0–1.5)
BILIRUB SERPL-MCNC: 0.4 MG/DL (ref 0–1.2)
BUN SERPL-MCNC: 12 MG/DL (ref 8–23)
BUN/CREAT SERPL: 14.8 (ref 7–25)
CALCIUM SPEC-SCNC: 9.8 MG/DL (ref 8.6–10.5)
CHLORIDE SERPL-SCNC: 105 MMOL/L (ref 98–107)
CO2 SERPL-SCNC: 24.2 MMOL/L (ref 22–29)
CREAT SERPL-MCNC: 0.81 MG/DL (ref 0.57–1)
DEPRECATED RDW RBC AUTO: 43.7 FL (ref 37–54)
EGFRCR SERPLBLD CKD-EPI 2021: 79.7 ML/MIN/1.73
EOSINOPHIL # BLD AUTO: 0.07 10*3/MM3 (ref 0–0.4)
EOSINOPHIL NFR BLD AUTO: 0.8 % (ref 0.3–6.2)
ERYTHROCYTE [DISTWIDTH] IN BLOOD BY AUTOMATED COUNT: 12.2 % (ref 12.3–15.4)
FOLATE SERPL-MCNC: >20 NG/ML (ref 4.78–24.2)
GLOBULIN UR ELPH-MCNC: 3.1 GM/DL
GLUCOSE SERPL-MCNC: 76 MG/DL (ref 65–99)
HCT VFR BLD AUTO: 38.5 % (ref 34–46.6)
HGB BLD-MCNC: 12.3 G/DL (ref 12–15.9)
IMM GRANULOCYTES # BLD AUTO: 0.02 10*3/MM3 (ref 0–0.05)
IMM GRANULOCYTES NFR BLD AUTO: 0.2 % (ref 0–0.5)
LYMPHOCYTES # BLD AUTO: 2.45 10*3/MM3 (ref 0.7–3.1)
LYMPHOCYTES NFR BLD AUTO: 27.9 % (ref 19.6–45.3)
MCH RBC QN AUTO: 31.1 PG (ref 26.6–33)
MCHC RBC AUTO-ENTMCNC: 31.9 G/DL (ref 31.5–35.7)
MCV RBC AUTO: 97.5 FL (ref 79–97)
MONOCYTES # BLD AUTO: 0.75 10*3/MM3 (ref 0.1–0.9)
MONOCYTES NFR BLD AUTO: 8.6 % (ref 5–12)
NEUTROPHILS NFR BLD AUTO: 5.44 10*3/MM3 (ref 1.7–7)
NEUTROPHILS NFR BLD AUTO: 62 % (ref 42.7–76)
NRBC BLD AUTO-RTO: 0 /100 WBC (ref 0–0.2)
PLATELET # BLD AUTO: 267 10*3/MM3 (ref 140–450)
PMV BLD AUTO: 9.8 FL (ref 6–12)
POTASSIUM SERPL-SCNC: 3.9 MMOL/L (ref 3.5–5.2)
PROT SERPL-MCNC: 7.1 G/DL (ref 6–8.5)
RBC # BLD AUTO: 3.95 10*6/MM3 (ref 3.77–5.28)
SODIUM SERPL-SCNC: 140 MMOL/L (ref 136–145)
TSH SERPL DL<=0.05 MIU/L-ACNC: 2.22 UIU/ML (ref 0.27–4.2)
VIT B12 BLD-MCNC: 796 PG/ML (ref 211–946)
WBC NRBC COR # BLD AUTO: 8.77 10*3/MM3 (ref 3.4–10.8)

## 2024-10-22 PROCEDURE — 82746 ASSAY OF FOLIC ACID SERUM: CPT | Performed by: PSYCHIATRY & NEUROLOGY

## 2024-10-22 PROCEDURE — 80050 GENERAL HEALTH PANEL: CPT | Performed by: NURSE PRACTITIONER

## 2024-10-22 PROCEDURE — G0378 HOSPITAL OBSERVATION PER HR: HCPCS

## 2024-10-22 PROCEDURE — 0 GADOBENATE DIMEGLUMINE 529 MG/ML SOLUTION: Performed by: STUDENT IN AN ORGANIZED HEALTH CARE EDUCATION/TRAINING PROGRAM

## 2024-10-22 PROCEDURE — 72156 MRI NECK SPINE W/O & W/DYE: CPT

## 2024-10-22 PROCEDURE — 82607 VITAMIN B-12: CPT | Performed by: PSYCHIATRY & NEUROLOGY

## 2024-10-22 PROCEDURE — 99203 OFFICE O/P NEW LOW 30 MIN: CPT

## 2024-10-22 PROCEDURE — 99203 OFFICE O/P NEW LOW 30 MIN: CPT | Performed by: NURSE PRACTITIONER

## 2024-10-22 PROCEDURE — A9577 INJ MULTIHANCE: HCPCS | Performed by: STUDENT IN AN ORGANIZED HEALTH CARE EDUCATION/TRAINING PROGRAM

## 2024-10-22 RX ORDER — MECLIZINE HYDROCHLORIDE 25 MG/1
25 TABLET ORAL ONCE
Status: DISCONTINUED | OUTPATIENT
Start: 2024-10-22 | End: 2024-10-23 | Stop reason: HOSPADM

## 2024-10-22 RX ORDER — MECLIZINE HYDROCHLORIDE 25 MG/1
25 TABLET ORAL 3 TIMES DAILY PRN
Qty: 30 TABLET | Refills: 0 | Status: SHIPPED | OUTPATIENT
Start: 2024-10-22

## 2024-10-22 RX ORDER — ONDANSETRON 4 MG/1
4 TABLET, ORALLY DISINTEGRATING ORAL EVERY 8 HOURS PRN
Qty: 20 TABLET | Refills: 0 | Status: SHIPPED | OUTPATIENT
Start: 2024-10-22

## 2024-10-22 RX ADMIN — CETIRIZINE HYDROCHLORIDE 10 MG: 10 TABLET ORAL at 09:26

## 2024-10-22 RX ADMIN — ROSUVASTATIN CALCIUM 5 MG: 5 TABLET, FILM COATED ORAL at 09:26

## 2024-10-22 RX ADMIN — Medication 10 ML: at 09:26

## 2024-10-22 RX ADMIN — GADOBENATE DIMEGLUMINE 10 ML: 529 INJECTION, SOLUTION INTRAVENOUS at 21:09

## 2024-10-22 RX ADMIN — Medication 10 ML: at 20:25

## 2024-10-22 NOTE — SIGNIFICANT NOTE
10/22/24 8133   OTHER   Discipline physical therapist   Therapy Assessment/Plan (PT)   Criteria for Skilled Interventions Met (PT) other (see comments)  (noted vestibular PT orders were cancelled. noted cervical stenosis. PT spoke to RN, consider PT eval for mobility as needed)

## 2024-10-22 NOTE — CONSULTS
Neurology Consult Note    Consult Date: 10/22/2024    Referring MD: Dominick Weathers MD    Reason for Consult I have been asked to see the patient in neurological consultation to render advice and opinion regarding dizziness.     June Baca is a 69 y.o. female with PMH of breast cancer, peripheral neuropathy, depression presents to the ED complaining of dizziness. Patient complained of episode of room spinning dizziness last week Tuesday that lasted several minutes before resolving and had associated nausea but not vomiting. Then yesterday morning at 9 am developed dizziness after taking hot shower with gradually worsened. She was nauseous and had a few episodes of emesis. She admits to worsening symptoms with position change and head movement. Symptoms are better when she closes her eyes. She denies any associated weakness, numbness, double vision, blurry vision, slurred speech, headache. She denies tinnitus, hearing loss, saddle anesthesia, UI, BI, urinary or bowel retention. No lhermitte sign. BP on arrival 136/77 mmHg and pulse 58 bpm.     Past Medical/Surgical Hx:  Past Medical History:   Diagnosis Date    Allergic 1965    seasonal, lifelong    COVID     Depression 03/1998    several months only    Drug therapy 03/12/2013    Endometriosis     History of colon polyps     Hx of radiation therapy 08/02/2013    Right breast    Inguinal hernia     Invasive ductal carcinoma of breast     RIGHT WITH METAPLASTIC FEATURES (SPINDLE CELL METAPLASIA) 1.5CM GRADE 2, ER WEAKLY POSITIVE, CT NEGATIVE, HER-2/SOLITARIO NEGATIVE    Low back pain 01/1991    usually mild, chiropractic maintenance    Osteopenia     Ovarian cyst     Peripheral neuropathy     RELATED TO TAXOL    Pneumonia 1965 & 1978    Postmenopausal     Uterine polyp      Past Surgical History:   Procedure Laterality Date    BREAST BIOPSY      BREAST LUMPECTOMY Right 01/30/2013    BREAST LUMPECTOMY WITH SENTINEL NODE BIOPSY Right 01/30/2013    BREAST SURGERY   01/30/2013    lumpectomy    COLONOSCOPY  2009?    COLONOSCOPY N/A 04/06/2021    Procedure: COLONOSCOPY INTO CECUM AND T.I. WITH COLD SNARE POLYPECTOMIES AND COLD BIOPSY POLYPECTOMIES;  Surgeon: Neli Wild MD;  Location: [facility] ENDOSCOPY;  Service: Gastroenterology;  Laterality: N/A;  PRE- SCREENING  POST- DIVERTICULOSIS, POLYPS, HEMORRHOIDS    COLONOSCOPY N/A 5/28/2024    Procedure: COLONOSCOPY to cecum with cold snare polypectomies and cold biopsies;  Surgeon: Neli Wild MD;  Location: [facility] ENDOSCOPY;  Service: Gastroenterology;  Laterality: N/A;  pre: hx of polyps  post:  polyps, abnormal mucosa, hemorrhoids, diverticulosis    CYSTECTOMY      D & C AND LAPAROSCOPY      EYE SURGERY      HERNIA REPAIR  1987    LAPAROSCOPIC OVARIAN CYSTECTOMY      LYMPH NODE BIOPSY  01/30/2013    related to breast cancer    MEDIPORT INSERTION, SINGLE  02/27/2013    DR. MIRANDA       Medications On Admission  Medications Prior to Admission   Medication Sig Dispense Refill Last Dose/Taking    ALLERGY SERUM INJECTION Inject  under the skin into the appropriate area as directed 1 (One) Time.   Past Month    B Complex Vitamins (vitamin b complex) capsule capsule Take 1 capsule by mouth Daily.   10/20/2024    CALCIUM PO Take 1 tablet by mouth Daily.   10/20/2024    CBD (cannabidiol) oral oil Daily.   10/20/2024    Cholecalciferol (VITAMIN D) 1000 UNITS tablet Take 1 tablet by mouth.   10/20/2024    Fluticasone Propionate (FLONASE ALLERGY RELIEF NA) 1 spray into the nostril(s) as directed by provider Daily.   10/20/2024    levocetirizine (XYZAL) 5 MG tablet Take 1 tablet by mouth Daily.   10/20/2024    MAGNESIUM PO Take 1 tablet by mouth Daily.   10/20/2024    Multiple Vitamins-Minerals (MULTIVITAMIN ADULT PO) Take 1 tablet by mouth Daily.   10/20/2024    rosuvastatin (CRESTOR) 5 MG tablet TAKE 1 TABLET DAILY 90 tablet 3 10/20/2024    Vitamin E 400 units tablet Take 1 tablet by mouth Daily.   10/20/2024  "      Allergies:  Allergies   Allergen Reactions    Latex Rash       Social Hx:  Social History     Socioeconomic History    Marital status:      Spouse name: Drew    Years of education: College   Tobacco Use    Smoking status: Never    Smokeless tobacco: Never   Vaping Use    Vaping status: Never Used   Substance and Sexual Activity    Alcohol use: Yes     Alcohol/week: 7.0 standard drinks of alcohol     Types: 7 Shots of liquor per week     Comment: 1/night    Drug use: No    Sexual activity: Defer     Partners: Male     Birth control/protection: Post-menopausal, None     Comment: little or no intercourse last 5 yrs., PT (dilators) 2017       Family Hx:  Family History   Problem Relation Age of Onset    Diabetes Mother         type 2    Hypertension Father     Prostate cancer Father     Cancer Father         prostate, 12(?) yrs. healthy    Hearing loss Father         resulting from shingles    Heart disease Father         TAVR procedure     Cancer Maternal Aunt         pancreatic,  2017 age 83    Cancer Maternal Aunt         stage 4 breast cancer, diagnosed 2017    Breast cancer Maternal Aunt     Malig Hyperthermia Neg Hx        Exam    /71 (BP Location: Left arm, Patient Position: Standing)   Pulse 66   Temp 97.8 °F (36.6 °C) (Oral)   Resp 18   Ht 154.9 cm (60.98\")   Wt 54.4 kg (119 lb 14.9 oz)   LMP  (LMP Unknown)   SpO2 98%   BMI 22.67 kg/m²   gen: NAD, vitals reviewed  MS: oriented x3, recent/remote memory intact, normal attention/concentration, language intact, no neglect, normal fund of knowledge  CN: visual acuity grossly normal, visual fields full, PERRL, EOMI without nystagmus, facial sensation equal, no facial droop, hearing symmetric, palate elevates symmetrically, shoulder shrug equal, tongue midline, negative test of skew and head impulse test  Motor: 5/5 throughout upper and lower extremities, normal tone  Sensation: intact to cold temperature " "throughout  Coordination: no dysmetria with finger to nose bilaterally  Gait: no ataxia, normal station    DATA:    Lab Results   Component Value Date    GLUCOSE 76 10/22/2024    CALCIUM 9.8 10/22/2024     10/22/2024    K 3.9 10/22/2024    CO2 24.2 10/22/2024     10/22/2024    BUN 12 10/22/2024    CREATININE 0.81 10/22/2024    EGFRIFAFRI 83 10/29/2020    EGFRIFNONA 56 (L) 09/21/2021    BCR 14.8 10/22/2024    ANIONGAP 10.8 10/22/2024     Lab Results   Component Value Date    WBC 8.77 10/22/2024    HGB 12.3 10/22/2024    HCT 38.5 10/22/2024    MCV 97.5 (H) 10/22/2024     10/22/2024     Lab Results   Component Value Date     (H) 06/11/2024     (H) 03/12/2024     (H) 01/08/2024     No results found for: \"HGBA1C\"  No results found for: \"INR\", \"PROTIME\"    Lab review:   Glucose 76  WBC 8.77  HgB 12.3  UA: turbid, pH >9.0, trace protein, rest WNL    Imaging review:   CTH:  FINDINGS: The brain and ventricles are symmetrical. There is no evidence  of hemorrhage, hydrocephalus or of acute infarction. Further evaluation  could be performed with a MRI examination of the brain as indicated.     CTA head and neck:  FINDINGS:  NONCONTRAST HEAD CT: No acute intracranial hemorrhage or mass/mass  effect. The ventricles and sulci are stable and appropriate for age. The  basilar cisterns are patent. Normal gray-white matter differentiation is  grossly maintained. Evidence of bilateral cataract surgery. Limited  imaging of the paranasal sinuses and mastoid air cells unremarkable. No  acute osseous abnormality is identified.     CTA NECK: Normal arch anatomy. The bilateral common and cervical  internal carotid arteries demonstrate normal course and caliber without  evidence of hemodynamically significant stenosis or injury. The  bilateral vertebral arteries demonstrate normal course and caliber  without evidence of hemodynamically significant stenosis or injury. The  left vertebral artery is " dominant.     The neck soft tissues are unremarkable. The airways are widely patent.  The vocal cords are symmetric and adducted. Apical pleural-based  parenchymal scarring. Large heterogeneous 3 cm nodule in the right lobe  of the thyroid gland. The submandibular glands and parotid glands are  unremarkable. Multilevel cervical spondylosis with stenoses.     CTA [place]: The bilateral intracranial internal carotid  arteries, anterior cerebral arteries, and middle cerebral arteries  demonstrate normal course and caliber without evidence of  hemodynamically significant stenosis or aneurysm about the [place]. A normal anterior communicating artery is identified.  Hypoplastic left P1 segment with a widely patent dominant left posterior  communicating artery, consistent with normal variant left persistent  posterior fetal circulation. The bilateral V4 segments, the basilar  artery, and the bilateral posterior cerebral arteries otherwise  demonstrate normal course and caliber without evidence of  hemodynamically significant stenosis or aneurysm about the [place]. A suspected small right posterior communicating artery is  identified.     POSTCONTRAST HEAD CT: No abnormal intracranial enhancement. The dural  venous sinuses are widely patent.     IMPRESSION:     1. No acute intracranial hemorrhage or mass/mass effect. No abnormal  intracranial enhancement  2. No hemodynamically significant stenosis or injury of the great  vessels of the neck.  3. No evidence of hemodynamically significant stenosis or aneurysm about  the [place].  4. Large heterogeneous 3 cm nodule in the right lobe of the thyroid  gland which could be further evaluated with routine outpatient  ultrasound if clinically indicated.    MRI internal auditory canal with and without:  FINDINGS:    Cranial nerves:  Unremarkable.  No mass.  No abnormal enhancement.    Cochlea and semicircular canals:  Unremarkable.     Cerebellopontine angles:  Unremarkable.  No mass.    Brain: Minimal nonspecific white matter changes.  The flow voids at the   base of the brain are intact.  No mass.  No hemorrhage.  Tiny pineal cyst   without mass-effect on the adjacent tectal plate.  No evidence of   abnormal enhancement.  The dural venous sinuses are patent.    Ventricles:  Unremarkable as visualized.    Bones joints: There is a critical spinal canal stenosis at C3-4 and C4-  5.  No acute fracture.    Sinuses: Chronic ethmoid sinusitis.  No acute sinusitis.    Mastoid air cells:  Unremarkable as visualized.  No mastoid effusion.    Orbits: Bilateral lens replacements.  IMPRESSION:       No evidence of acute intracranial pathology.  Negative MRI of the IACs.  Critical spinal canal stenosis at C3-4 and C4-5.  Recommend MRI cervical   spine to evaluate for myelopathy.      Diagnoses:  Acute dizziness likely benign positional paroxysmal vertigo  Critical Cervical C3-C5  spinal stenosis  History of breast cancer  Peripheral neuropathy  Depression    Comment: Patient symptoms align mostly with Benign positional paroxysmal vertigo. Symptoms made worse with head movement and position change with associated N/V. She denies any focal deficits. She denies history of migraine. No recent illness or infection. Symptoms much improved today with mild dizziness and no nausea.     CTH demonstrate no acute hemorrhage or hypodensity. CTA head and neck show no LVO or significant narrowing. Thyroid nodule incidentally found which she already follows with ENT for. MRI internal auditory canal with and without show no acute intracranial pathology. There was critical cervical spinal stenosis at C3-C5 noted. Not related to patients acute dizziness. Patient had MRI cervical spine with and without scheduled and should have neurosurgery see and clear before performing ann hallpike maneuver.     PLAN:   Continue meclizine, valium and zofran as needed for  nausea/dizziness.  Cervical MRI with and without contrast  Neurosurgery consult for critcal C3-C5 stenosis and for clearance for vestibular PT to perform ann-hallpike maneuver  Follow up with ENT outpatient for vertigo and thyroid nodule    Recommendations discussed with Dr. Sears who is in agreement with plan.

## 2024-10-22 NOTE — CONSULTS
[facility], [place], operated by [facility] NEUROSURGERY CONSULT NOTE    Patient name: June Baca  Referring Provider: Florinda Martines PA-c  Reason for Consultation: Critical C3-4 and C4-5 stenosis seen on MRI brain, complaint of dizziness     Patient Care Team:  Provider, No Known as PCP - Jamia Rain MD as Consulting Physician (Hematology and Oncology)  Faiza Arita MD as Referring Physician (Breast Surgery)    Chief complaint: dizziness    Subjective .     History of present illness:    Patient is a 69 y.o.  female admitted for workup of acute onset of dizziness described as room spinning that began 1 week ago.  Lasted several minutes and resolved spontaneously.  Associated with nausea.  She had recurrence of symptoms yesterday morning upon awakening and worsened with a hot shower.  Had nausea and vomiting with recurrent symptoms.  No associated numbness, tingling, weakness or speech changes.  No visual changes.  She does report resolution of symptoms with closing her eyes. CTA head and neck as well as MRI brain obtained.  Reported findings of cervical stenosis on MRI brain.  She states that she had a cervical MRI over a year ago ordered by her chiropractor who has followed her for chronic neck pain.  She states that she is aware of some stenosis.  She denies any numbness, tingling, weakness, dexterity issues or gait issues.  She states that she has some mild neuropathy of her left hand following chemotherapy but this is chronic.  She denies any increase in neck pain of late.    History of invasive ductal carcinoma of the breast status post lumpectomy with sentinel node biopsy, radiation, chemotherapy.  Non-smoker.  No prior spine surgery.      Review of Systems  Review of Systems   HENT:  Negative for tinnitus.    Eyes:  Negative for visual disturbance.   Gastrointestinal:  Positive for nausea and vomiting.   Genitourinary:  Negative for enuresis.   Musculoskeletal:  Positive for neck pain (chronic). Negative for  gait problem.   Neurological:  Positive for dizziness and numbness. Negative for weakness and headaches.       History  PAST MEDICAL HISTORY  Past Medical History:   Diagnosis Date    Allergic 1965    seasonal, lifelong    COVID     Depression 03/1998    several months only    Drug therapy 03/12/2013    Endometriosis     History of colon polyps     Hx of radiation therapy 08/02/2013    Right breast    Inguinal hernia     Invasive ductal carcinoma of breast     RIGHT WITH METAPLASTIC FEATURES (SPINDLE CELL METAPLASIA) 1.5CM GRADE 2, ER WEAKLY POSITIVE, MD NEGATIVE, HER-2/SOLITARIO NEGATIVE    Low back pain 01/1991    usually mild, chiropractic maintenance    Osteopenia     Ovarian cyst     Peripheral neuropathy     RELATED TO TAXOL    Pneumonia 1965 & 1978    Postmenopausal     Uterine polyp        PAST SURGICAL HISTORY  Past Surgical History:   Procedure Laterality Date    BREAST BIOPSY      BREAST LUMPECTOMY Right 01/30/2013    BREAST LUMPECTOMY WITH SENTINEL NODE BIOPSY Right 01/30/2013    BREAST SURGERY  01/30/2013    lumpectomy    COLONOSCOPY  2009?    COLONOSCOPY N/A 04/06/2021    Procedure: COLONOSCOPY INTO CECUM AND T.I. WITH COLD SNARE POLYPECTOMIES AND COLD BIOPSY POLYPECTOMIES;  Surgeon: Neli Wild MD;  Location: [facility] ENDOSCOPY;  Service: Gastroenterology;  Laterality: N/A;  PRE- SCREENING  POST- DIVERTICULOSIS, POLYPS, HEMORRHOIDS    COLONOSCOPY N/A 5/28/2024    Procedure: COLONOSCOPY to cecum with cold snare polypectomies and cold biopsies;  Surgeon: Neli Wild MD;  Location: [facility] ENDOSCOPY;  Service: Gastroenterology;  Laterality: N/A;  pre: hx of polyps  post:  polyps, abnormal mucosa, hemorrhoids, diverticulosis    CYSTECTOMY      D & C AND LAPAROSCOPY      EYE SURGERY      HERNIA REPAIR  1987    LAPAROSCOPIC OVARIAN CYSTECTOMY      LYMPH NODE BIOPSY  01/30/2013    related to breast cancer    MEDIPORT INSERTION, SINGLE  02/27/2013    DR. MIRANDA       FAMILY HISTORY  Family History    Problem Relation Age of Onset    Diabetes Mother         type 2    Hypertension Father     Prostate cancer Father     Cancer Father         prostate, 12(?) yrs. healthy    Hearing loss Father         resulting from shingles    Heart disease Father         TAVR procedure 2016    Cancer Maternal Aunt         pancreatic,  2017 age 83    Cancer Maternal Aunt         stage 4 breast cancer, diagnosed 2017    Breast cancer Maternal Aunt     Malig Hyperthermia Neg Hx        SOCIAL HISTORY  Social History     Tobacco Use    Smoking status: Never    Smokeless tobacco: Never   Vaping Use    Vaping status: Never Used   Substance Use Topics    Alcohol use: Yes     Alcohol/week: 7.0 standard drinks of alcohol     Types: 7 Shots of liquor per week     Comment: 1/night    Drug use: No       full time job doing for the [email or web address]  Lives with     Allergies:  Latex    MEDICATIONS:  Medications Prior to Admission   Medication Sig Dispense Refill Last Dose/Taking    ALLERGY SERUM INJECTION Inject  under the skin into the appropriate area as directed 1 (One) Time.   Past Month    B Complex Vitamins (vitamin b complex) capsule capsule Take 1 capsule by mouth Daily.   10/20/2024    CALCIUM PO Take 1 tablet by mouth Daily.   10/20/2024    CBD (cannabidiol) oral oil Daily.   10/20/2024    Cholecalciferol (VITAMIN D) 1000 UNITS tablet Take 1 tablet by mouth.   10/20/2024    Fluticasone Propionate (FLONASE ALLERGY RELIEF NA) 1 spray into the nostril(s) as directed by provider Daily.   10/20/2024    levocetirizine (XYZAL) 5 MG tablet Take 1 tablet by mouth Daily.   10/20/2024    MAGNESIUM PO Take 1 tablet by mouth Daily.   10/20/2024    Multiple Vitamins-Minerals (MULTIVITAMIN ADULT PO) Take 1 tablet by mouth Daily.   10/20/2024    rosuvastatin (CRESTOR) 5 MG tablet TAKE 1 TABLET DAILY 90 tablet 3 10/20/2024    Vitamin E 400 units tablet Take 1 tablet by mouth Daily.   10/20/2024          Current Facility-Administered Medications:     sennosides-docusate (PERICOLACE) 8.6-50 MG per tablet 2 tablet, 2 tablet, Oral, BID PRN **AND** polyethylene glycol (MIRALAX) packet 17 g, 17 g, Oral, Daily PRN **AND** bisacodyl (DULCOLAX) EC tablet 5 mg, 5 mg, Oral, Daily PRN **AND** bisacodyl (DULCOLAX) suppository 10 mg, 10 mg, Rectal, Daily PRN, Kristine Santos, RINA    cetirizine (zyrTEC) tablet 10 mg, 10 mg, Oral, Daily, Florinda Martines R, PA-C, 10 mg at 10/22/24 0926    cholecalciferol (VITAMIN D3) tablet 1,000 Units, 1,000 Units, Oral, Daily, Florinda Martines PA-C    diazePAM (VALIUM) tablet 5 mg, 5 mg, Oral, Once PRN, Kristine Santos APRN    fluticasone (FLONASE) 50 MCG/ACT nasal spray 1 spray, 1 spray, Nasal, Daily, Florinda Martines PA-ALYSIA    meclizine (ANTIVERT) tablet 25 mg, 25 mg, Oral, Once, Florinda Martines PA-C    multivitamin with minerals 1 tablet, 1 tablet, Oral, Daily, Isabella Martineslin MARTINA PA-C    ondansetron ODT (ZOFRAN-ODT) disintegrating tablet 4 mg, 4 mg, Oral, Q6H PRN **OR** ondansetron (ZOFRAN) injection 4 mg, 4 mg, Intravenous, Q6H PRN, Kristine Santos APRN    rosuvastatin (CRESTOR) tablet 5 mg, 5 mg, Oral, Daily, Florinda Martines R PA-C, 5 mg at 10/22/24 0926    [COMPLETED] Insert Peripheral IV, , , Once **AND** sodium chloride 0.9 % flush 10 mL, 10 mL, Intravenous, PRN, Juan Cole PA    [COMPLETED] Insert Peripheral IV, , , Once **AND** sodium chloride 0.9 % flush 10 mL, 10 mL, Intravenous, PRN, Jaun Cole PA    sodium chloride 0.9 % flush 10 mL, 10 mL, Intravenous, Q12H, Kristine Santos APRN, 10 mL at 10/22/24 0926    sodium chloride 0.9 % flush 10 mL, 10 mL, Intravenous, PRN, Kristine Santos APRN      Objective     Results Review:  LABS:  Results from last 7 days   Lab Units 10/22/24  0359 10/21/24  1401   WBC 10*3/mm3 8.77 10.16   HEMOGLOBIN g/dL 12.3 12.7   HEMATOCRIT % 38.5 39.5   PLATELETS 10*3/mm3 267 251     Results from last 7 days   Lab Units  10/22/24  0359 10/21/24  1401   SODIUM mmol/L 140 139   POTASSIUM mmol/L 3.9 3.7   CHLORIDE mmol/L 105 103   CO2 mmol/L 24.2 25.3   BUN mg/dL 12 14   CREATININE mg/dL 0.81 0.91   CALCIUM mg/dL 9.8 9.7   BILIRUBIN mg/dL 0.4 0.4   ALK PHOS U/L 77 85   ALT (SGPT) U/L 17 18   AST (SGOT) U/L 24 25   GLUCOSE mg/dL 76 120*     High-sensitivity troponin less than 6  Urinalysis trace protein and pH greater than 9 otherwise unremarkable    DIAGNOSTICS:  MRI IAC protocol no evidence of enhancing mass.  Radiologist reports incidental finding of C3-5 critical spinal canal stenosis.  I can only visualize the cervical canal on a sagittal series and I do not appreciate more than mild canal stenosis at C4/5.      CTA head and neck reviewed for spine findings.  There is multilevel degenerative disc disease and osteophytes resulting in moderate-moderately severe canal stenosis at several levels.it appears most severe at C5/6.  Radiologist reports no intracranial hemorrhage or mass effect, no hemodynamically significant stenosis, no injury to great vessels of the neck, no aneurysm, incidental finding of a 3 cm right thyroid lobe nodule    Results Review:   I reviewed the patient's new clinical results.  I personally viewed and interpreted the patient's MRI IAC protocol and CT head and neck for spine review    Vital Signs   Temp:  [97.4 °F (36.3 °C)-98.3 °F (36.8 °C)] 98.3 °F (36.8 °C)  Heart Rate:  [58-77] 63  Resp:  [16-20] 18  BP: (108-154)/(64-78) 112/64    Physical Exam:  Physical Exam  Vitals reviewed.   Constitutional:       Appearance: Normal appearance.   Neck:      Comments: Negative Lhermitte, negative Spurling  Pulmonary:      Effort: Pulmonary effort is normal.   Musculoskeletal:      Cervical back: Normal range of motion and neck supple.   Skin:     General: Skin is warm and dry.   Neurological:      General: No focal deficit present.      Motor: Motor strength is normal.     Deep Tendon Reflexes:      Reflex Scores:        Tricep reflexes are 1+ on the right side and 1+ on the left side.       Bicep reflexes are 1+ on the right side and 1+ on the left side.       Brachioradialis reflexes are 1+ on the right side and 1+ on the left side.       Patellar reflexes are 2+ on the right side and 2+ on the left side.       Achilles reflexes are 1+ on the right side and 1+ on the left side.  Psychiatric:         Mood and Affect: Mood normal.         Thought Content: Thought content normal.       Neurological Exam  Mental Status  Awake, alert and oriented to person, place and time.    Motor  Normal muscle bulk throughout. No fasciculations present. Normal muscle tone. The following abnormal movements were seen: Strength is 5/5 throughout all four extremities.    Sensory  Light touch is normal in upper and lower extremities.     Reflexes                                            Right                      Left  Brachioradialis                    1+                         1+  Biceps                                 1+                         1+  Triceps                                1+                         1+  Patellar                                2+                         2+  Achilles                                1+                         1+  Right Plantar: downgoing  Left Plantar: downgoing    Right pathological reflexes: Esthela's absent. Ankle clonus absent.  Left pathological reflexes: Esthela's absent. Ankle clonus absent.    Gait      Not Tested.      Assessment & Plan       Dizziness    Cervical stenosis of spine      Problem List Items Addressed This Visit          Unprioritized    * (Principal) Dizziness - Primary     Other Visit Diagnoses       Gait instability                 COMORBID CONDITIONS:  Cancer including (primary or metastatic)    Patient presents for evaluation of vertigo, 2 episodes in the last week.  Yesterday was worse and associate with nausea and vomiting.  Vascular and IAC protocol imaging complete.  No  "evidence of endovascular stenosis or brain mass.  Radiologist reports critical cervical stenosis on MRI brain although there is only 1 image of the cervical spine that is the sagittal series.  I do not see critical stenosis on this.  I reviewed the CTA head and neck which does review through the entire cervical spine.  There is moderate to moderately severe canal stenosis at several levels but nothing critical to my review.  Patient does not appear myelopathic and she denies any myelopathic features.  Neurology service still to evaluate for vertigo.  Will obtain MRI cervical spine.  Patient states she had one about a year and a half ago at [facility] and has been followed by chiropractor for chronic neck pain.  I did recommend against chiropractic adjustments in her neck.  We will request [facility] of the [facility] images for comparison.  As she has no acute deficits, okay from neurosurgical standpoint for discharge and outpatient follow-up with MRI if unable to obtain MRI in a reasonable timeframe today.    PLAN:   MRI cervical spine with and without contrast-if able to do it a reasonable time today, get prior to discharge.  If patient desires to discharge and complete as outpatient, we will arrange that  Follow-up neurosurgery after MRI cervical  [facility] cervical MRI from [facility] if possible    I discussed the patient's findings and my recommendations with patient, family, primary care team, and Dr. Barnes    During patient visit, I utilized appropriate personal protective equipment including gloves. Appropriate PPE was worn during the entire visit.  Hand hygiene was completed before and after.     Soniya Menchaca, APRN  10/22/24  11:42 EDT    \"Dictated utilizing Dragon dictation\".    Hi greater than 9 otherwise unremarkablegh-sensitivity troponin less than 6  Urinalysis trace protein, turbid appearance and pH of  "

## 2024-10-22 NOTE — PLAN OF CARE
Goal Outcome Evaluation:              Outcome Evaluation: Pt to stay for another MRI.   wait until after 24 hr. due to constrast. VSS. Pt  N/V resolved.    Pt to stay for MRI and possible PT.   Pt does not have any other questions at this time.

## 2024-10-22 NOTE — PLAN OF CARE
"Goal Outcome Evaluation:         Pt admitted for constant dizziness. Dizziness has been constant throughout the night however the patient was able to sleep and ambulate with stand by assistance. Patient described the dizziness as it felt like her head was \"swimming\". A+Ox4, RA, stand by assist. Neurosurgery consult in the morning.                                      "

## 2024-10-22 NOTE — PROGRESS NOTES
PRIYA ADNE Attestation Note    I supervised care provided by the midlevel provider.    The DEONTE and I have discussed this patient's history, physical exam, and treatment plan. I have reviewed the documentation and personally had a face to face interaction with the patient  I affirm the documentation and agree with the treatment and plan. I provided a substantive portion of the care of this patient.  I personally performed the physical exam, in its entirety.  My personal findings are documented in below:    History:  69-year-old female admitted to the observation unit for further evaluation and management of dizziness.  Patient initially presented to the emergency department with severe sudden onset of dizziness that prevented ambulation.  Patient was admitted for further evaluation for potential central cause as well as symptomatic control.    Physical Exam:  General: No acute distress.  HENT: NCAT, PERRL, Nares patent.  Eyes: no scleral icterus.  Neck: trachea midline, no ROM limitations.  CV: Pink warm and well-perfused throughout  Respiratory: No distress or increased work of breathing  Abdomen: soft, no focal tenderness or rigidity  Musculoskeletal: no deformity.  Neuro: alert, moves all extremities, follows commands.  Skin: warm, dry.    Assessment and Plan:  69-year-old female admitted to the observation unit for further evaluation and management of dizziness  - Symptoms improved significantly this morning  - MRI brain with no acute findings however severe cervical spinal stenosis noted  - MRI cervical spine pending  - Neurosurgery consulted  - Neurology consulted

## 2024-10-22 NOTE — PROGRESS NOTES
ED OBSERVATION PROGRESS/DISCHARGE SUMMARY    Date of Admission: 10/21/2024   LOS: 0 days   PCP: Provider, No Known      Subjective: No acute events overnight, dizziness seems to be improving    Hospital Outcome:   69-year-old female with a past medical history including breast cancer, depression, cervical stenosis who presents to [facility] ER with dizziness.  In the ER, head CT showed no acute intracranial findings.  Urinalysis showed no evidence of infection and lab work was notable for glucose at 120 otherwise fairly unremarkable.    CTA of the head and neck showed no evidence of acute intracranial hemorrhage, no abnormal intracranial enhancement, no hemodynamically significant stenosis.  An MRI internal auditory canal with and without is negative for acute intracranial pathology with a negative MRI of the ICAs but does note critical spinal canal stenosis at C3-4 and C4-5.    I discussed MRI results with the patient who states she was aware of these findings of stenosis at the cervical spine.  She states she has previously had an outpatient MRI done that had reported similar findings.      We will attempt to obtain those records this morning to compare and consults to neurosurgery and neurology placed.    10/22:   Seen and evaluated by DELMI with neurology team who recommends against PT vestibular eval given abnormal C spine findings. Fortunately this AM her dizziness is markedly improved.     Seen by APRN with neurosurgery team who recommends patient undergo MRI of her C spine. Fortunately she does not have any arm weakness or numbness      ROS:  General: no fevers, chills  Respiratory: no cough, dyspnea  Cardiovascular: no chest pain, palpitations  Abdomen: No abdominal pain, nausea, vomiting, or diarrhea  Neurologic: No focal weakness    Objective   Physical Exam:  I have reviewed the vital signs.  Temp:  [97.4 °F (36.3 °C)-97.9 °F (36.6 °C)] 97.9 °F (36.6 °C)  Heart Rate:  [58-77] 68  Resp:   [16-20] 18  BP: (122-154)/(67-77) 122/67  General Appearance:    Alert, cooperative, no distress  Head:    Normocephalic, atraumatic  Eyes:    Sclerae anicteric  Neck:   Supple, no mass  Lungs: Clear to auscultation bilaterally, respirations unlabored  Heart: Regular rate and rhythm, S1 and S2 normal, no murmur, rub or gallop  Abdomen:  Soft, nontender, bowel sounds active, nondistended  Extremities: No clubbing, cyanosis, or edema to lower extremities  Pulses:  2+ and symmetric in distal lower extremities  Skin: No rashes   Neurologic: Oriented x3, Normal strength to extremities    Results Review:    I have reviewed the labs, radiology results and diagnostic studies.    Results from last 7 days   Lab Units 10/22/24  0359   WBC 10*3/mm3 8.77   HEMOGLOBIN g/dL 12.3   HEMATOCRIT % 38.5   PLATELETS 10*3/mm3 267     Results from last 7 days   Lab Units 10/22/24  0359 10/21/24  1401   SODIUM mmol/L 140 139   POTASSIUM mmol/L 3.9 3.7   CHLORIDE mmol/L 105 103   CO2 mmol/L 24.2 25.3   BUN mg/dL 12 14   CREATININE mg/dL 0.81 0.91   CALCIUM mg/dL 9.8 9.7   BILIRUBIN mg/dL 0.4 0.4   ALK PHOS U/L 77 85   ALT (SGPT) U/L 17 18   AST (SGOT) U/L 24 25   GLUCOSE mg/dL 76 120*     Imaging Results (Last 24 Hours)       Procedure Component Value Units Date/Time    MRI Internal Auditory Canal With Wo [[id number]] Collected: 10/21/24 2254     Updated: 10/21/24 2254    Narrative:        Patient: FRANCISCO ELMORE  Time Out: 22:53  Exam(s): MRI IACS      EXAM:    MR Head Without Intravenous Contrast, Internal Auditory Canal Protocol    CLINICAL HISTORY:     Reason for exam: left ear fullness,dizziness, n v.    TECHNIQUE:    Magnetic resonance images of the head brain without intravenous   contrast in multiple planes using internal auditory canal protocol.    COMPARISON:  Prior CT scan of the head from October 21, 2024.    FINDINGS:    Cranial nerves:  Unremarkable.  No mass.  No abnormal enhancement.    Cochlea and semicircular canals:   Unremarkable.    Cerebellopontine angles:  Unremarkable.  No mass.    Brain: Minimal nonspecific white matter changes.  The flow voids at the   base of the brain are intact.  No mass.  No hemorrhage.  Tiny pineal cyst   without mass-effect on the adjacent tectal plate.  No evidence of   abnormal enhancement.  The dural venous sinuses are patent.    Ventricles:  Unremarkable as visualized.    Bones joints: There is a critical spinal canal stenosis at C3-4 and C4-  5.  No acute fracture.    Sinuses: Chronic ethmoid sinusitis.  No acute sinusitis.    Mastoid air cells:  Unremarkable as visualized.  No mastoid effusion.    Orbits: Bilateral lens replacements.    IMPRESSION:       No evidence of acute intracranial pathology.    Negative MRI of the IACs.    Critical spinal canal stenosis at C3-4 and C4-5.  Recommend MRI cervical   spine to evaluate for myelopathy.      Impression:          Electronically signed by Hortensia Delgado MD on 10-21-24 at 2253    CT Angiogram Head [[id number]] Collected: 10/21/24 1851     Updated: 10/21/24 1929    Narrative:      CT ANGIOGRAM HEAD-, CT ANGIOGRAM NECK-     DATE OF EXAM: 10/21/2024 5:37 PM     INDICATION: Dizziness. Nausea and vomiting.     COMPARISON: Noncontrast head CT 10/21/2024.     TECHNIQUE: Multiple contiguous axial images were acquired through the  head without the intravenous administration of contrast. Then, multiple  contiguous axial images were acquired through the head and neck  following the intravenous administration of 95 mL of Isovue-370.  Finally, delayed postcontrast images were obtained through the head.  Reformatted coronal, sagittal, and 3D reconstruction images were also  reviewed. Radiation dose reduction techniques were utilized, including  automated exposure control and exposure modulation based on body size.  3D imaging was not available at the time of initial interpretation.     FINDINGS:  NONCONTRAST HEAD CT: No acute intracranial hemorrhage or  mass/mass  effect. The ventricles and sulci are stable and appropriate for age. The  basilar cisterns are patent. Normal gray-white matter differentiation is  grossly maintained. Evidence of bilateral cataract surgery. Limited  imaging of the paranasal sinuses and mastoid air cells unremarkable. No  acute osseous abnormality is identified.     CTA NECK: Normal arch anatomy. The bilateral common and cervical  internal carotid arteries demonstrate normal course and caliber without  evidence of hemodynamically significant stenosis or injury. The  bilateral vertebral arteries demonstrate normal course and caliber  without evidence of hemodynamically significant stenosis or injury. The  left vertebral artery is dominant.     The neck soft tissues are unremarkable. The airways are widely patent.  The vocal cords are symmetric and adducted. Apical pleural-based  parenchymal scarring. Large heterogeneous 3 cm nodule in the right lobe  of the thyroid gland. The submandibular glands and parotid glands are  unremarkable. Multilevel cervical spondylosis with stenoses.     CTA [place] OF [place]: The bilateral intracranial internal carotid  arteries, anterior cerebral arteries, and middle cerebral arteries  demonstrate normal course and caliber without evidence of  hemodynamically significant stenosis or aneurysm about the [place] of  [place]. A normal anterior communicating artery is identified.  Hypoplastic left P1 segment with a widely patent dominant left posterior  communicating artery, consistent with normal variant left persistent  posterior fetal circulation. The bilateral V4 segments, the basilar  artery, and the bilateral posterior cerebral arteries otherwise  demonstrate normal course and caliber without evidence of  hemodynamically significant stenosis or aneurysm about the [place] of  [place]. A suspected small right posterior communicating artery is  identified.     POSTCONTRAST HEAD CT: No abnormal intracranial  enhancement. The dural  venous sinuses are widely patent.       Impression:         1. No acute intracranial hemorrhage or mass/mass effect. No abnormal  intracranial enhancement  2. No hemodynamically significant stenosis or injury of the great  vessels of the neck.  3. No evidence of hemodynamically significant stenosis or aneurysm about  the [place] of [place].  4. Large heterogeneous 3 cm nodule in the right lobe of the thyroid  gland which could be further evaluated with routine outpatient  ultrasound if clinically indicated.     This report was finalized on 10/21/2024 7:25 PM by Facundo Stephen MD on  Workstation: [id number]       CT Angiogram Neck [[id number]] Collected: 10/21/24 1851     Updated: 10/21/24 1929    Narrative:      CT ANGIOGRAM HEAD-, CT ANGIOGRAM NECK-     DATE OF EXAM: 10/21/2024 5:37 PM     INDICATION: Dizziness. Nausea and vomiting.     COMPARISON: Noncontrast head CT 10/21/2024.     TECHNIQUE: Multiple contiguous axial images were acquired through the  head without the intravenous administration of contrast. Then, multiple  contiguous axial images were acquired through the head and neck  following the intravenous administration of 95 mL of Isovue-370.  Finally, delayed postcontrast images were obtained through the head.  Reformatted coronal, sagittal, and 3D reconstruction images were also  reviewed. Radiation dose reduction techniques were utilized, including  automated exposure control and exposure modulation based on body size.  3D imaging was not available at the time of initial interpretation.     FINDINGS:  NONCONTRAST HEAD CT: No acute intracranial hemorrhage or mass/mass  effect. The ventricles and sulci are stable and appropriate for age. The  basilar cisterns are patent. Normal gray-white matter differentiation is  grossly maintained. Evidence of bilateral cataract surgery. Limited  imaging of the paranasal sinuses and mastoid air cells unremarkable. No  acute osseous abnormality is  identified.     CTA NECK: Normal arch anatomy. The bilateral common and cervical  internal carotid arteries demonstrate normal course and caliber without  evidence of hemodynamically significant stenosis or injury. The  bilateral vertebral arteries demonstrate normal course and caliber  without evidence of hemodynamically significant stenosis or injury. The  left vertebral artery is dominant.     The neck soft tissues are unremarkable. The airways are widely patent.  The vocal cords are symmetric and adducted. Apical pleural-based  parenchymal scarring. Large heterogeneous 3 cm nodule in the right lobe  of the thyroid gland. The submandibular glands and parotid glands are  unremarkable. Multilevel cervical spondylosis with stenoses.     CTA [place] OF [place]: The bilateral intracranial internal carotid  arteries, anterior cerebral arteries, and middle cerebral arteries  demonstrate normal course and caliber without evidence of  hemodynamically significant stenosis or aneurysm about the [place] of  [place]. A normal anterior communicating artery is identified.  Hypoplastic left P1 segment with a widely patent dominant left posterior  communicating artery, consistent with normal variant left persistent  posterior fetal circulation. The bilateral V4 segments, the basilar  artery, and the bilateral posterior cerebral arteries otherwise  demonstrate normal course and caliber without evidence of  hemodynamically significant stenosis or aneurysm about the [place] of  [place]. A suspected small right posterior communicating artery is  identified.     POSTCONTRAST HEAD CT: No abnormal intracranial enhancement. The dural  venous sinuses are widely patent.       Impression:         1. No acute intracranial hemorrhage or mass/mass effect. No abnormal  intracranial enhancement  2. No hemodynamically significant stenosis or injury of the great  vessels of the neck.  3. No evidence of hemodynamically significant stenosis or aneurysm  about  the [place] of [place].  4. Large heterogeneous 3 cm nodule in the right lobe of the thyroid  gland which could be further evaluated with routine outpatient  ultrasound if clinically indicated.     This report was finalized on 10/21/2024 7:25 PM by Facundo Stephen MD on  Workstation: [id number]       CT Head Without Contrast [[id number]] Collected: 10/21/24 1445     Updated: 10/21/24 1449    Narrative:      CT HEAD WO CONTRAST-     HISTORY:  Dizzy     COMPARISON: None     FINDINGS: The brain and ventricles are symmetrical. There is no evidence  of hemorrhage, hydrocephalus or of acute infarction. Further evaluation  could be performed with a MRI examination of the brain as indicated.                 Radiation dose reduction techniques were utilized, including automated  exposure modulation based on body size.     This report was finalized on 10/21/2024 2:46 PM by Dr. Burt Su M.D on Workstation: [id number]       XR Chest 1 View [[id number]] Collected: 10/21/24 1356     Updated: 10/21/24 1359    Narrative:      XR CHEST 1 VW-     HISTORY: Female who is 69 years-old, dizziness     TECHNIQUE: Frontal views of the chest     COMPARISON: None available     FINDINGS: Heart, mediastinum and pulmonary vasculature are unremarkable.  No focal pulmonary consolidation, pleural effusion, or pneumothorax. No  acute osseous process.       Impression:      No evidence for acute pulmonary process. Follow-up as  clinical indications persist.     This report was finalized on 10/21/2024 1:56 PM by Dr. Hugo Sweeney M.D on Workstation: [id number]               I have reviewed the medications.  ---------------------------------------------------------------------------------------------  Assessment & Plan   Assessment/Problem List    Dizziness      Plan:    Dizziness  -CT head negative for acute findings  -Check orthostatic vital signs  -CTA head and neck negative acute  -MRI brain with and without contrast and IAC protocol  negative for intracranial findings, does note critical stenosis at C3-C4, C4-5  -Patient states she is aware of the previous finding of critical stenosis in the cervical spine, states she has had an outpatient MRI done that showed similar findings,  -Neurosurgery consulted  -Neurology consult, patient's symptoms consistent with BPPV, recommend outpatient ENT follow-up  -MRI cervical spine pending    Disposition: pending    Follow-up after Discharge: TENNILLE, PCP    This note will serve as a progress note      52 minutes have been spent by [facility] providers in the care of this patient while under observation status.    Appropriate PPE worn during patient encounter.  Hand hygeine performed before and after seeing the patient.      Florinda Martines PA-C 10/22/24 04:56 EDT

## 2024-10-23 ENCOUNTER — READMISSION MANAGEMENT (OUTPATIENT)
Dept: CALL CENTER | Facility: HOSPITAL | Age: 69
End: 2024-10-23
Payer: COMMERCIAL

## 2024-10-23 VITALS
RESPIRATION RATE: 16 BRPM | DIASTOLIC BLOOD PRESSURE: 71 MMHG | WEIGHT: 119.93 LBS | HEART RATE: 67 BPM | SYSTOLIC BLOOD PRESSURE: 111 MMHG | TEMPERATURE: 97.7 F | HEIGHT: 61 IN | BODY MASS INDEX: 22.64 KG/M2 | OXYGEN SATURATION: 97 %

## 2024-10-23 PROCEDURE — G0378 HOSPITAL OBSERVATION PER HR: HCPCS

## 2024-10-23 RX ADMIN — CETIRIZINE HYDROCHLORIDE 10 MG: 10 TABLET ORAL at 10:11

## 2024-10-23 RX ADMIN — Medication 1 TABLET: at 10:11

## 2024-10-23 RX ADMIN — Medication 10 ML: at 10:11

## 2024-10-23 RX ADMIN — ROSUVASTATIN CALCIUM 5 MG: 5 TABLET, FILM COATED ORAL at 10:11

## 2024-10-23 RX ADMIN — Medication 1000 UNITS: at 10:11

## 2024-10-23 NOTE — DISCHARGE SUMMARY
ED OBSERVATION PROGRESS/DISCHARGE SUMMARY    Date of Admission: 10/21/2024   LOS: 0 days   PCP: Provider, No Known      Subjective:  Patient denies any further dizziness this morning.  Denies any numbness and tingling in the bilateral extremities as well as weakness.  Patient is requesting a disc with images from her MRI cervical spine to take with her at discharge.    Hospital Outcome:   69-year-old female with a past medical history including breast cancer, depression, cervical stenosis who presents to [facility] ER with dizziness.  In the ER, head CT showed no acute intracranial findings.  Urinalysis showed no evidence of infection and lab work was notable for glucose at 120 otherwise fairly unremarkable.     CTA of the head and neck showed no evidence of acute intracranial hemorrhage, no abnormal intracranial enhancement, no hemodynamically significant stenosis.  An MRI internal auditory canal with and without is negative for acute intracranial pathology with a negative MRI of the ICAs but does note critical spinal canal stenosis at C3-4 and C4-5.     I discussed MRI results with the patient who states she was aware of these findings of stenosis at the cervical spine.  She states she has previously had an outpatient MRI done that had reported similar findings.       We will attempt to obtain those records this morning to compare and consults to neurosurgery and neurology placed.     10/22:   Seen and evaluated by DELMI with neurology team who recommends against PT vestibular eval given abnormal C spine findings. Fortunately this AM her dizziness is markedly improved.  He was also seen and evaluated by neurosurgery, noting patient does not appear myelopathic and denies any myelopathic features.  Recommended to obtain MRI of the cervical spine and follow-up in their office outpatient.  Patient wanted to stay to have MRI completed at this time.  MRI was completed however results are still pending past  11 PM and patient was kept overnight.    10/23:  MRI cervical spine with and without shows mild central spinal stenosis at C3-4, C4-5 and C5-6 with no abnormal enhancement, isolated disc herniation, epidural hematoma, abscess, abnormal cord signal, compression deformity or discitis/osteomyelitis.  Patient will be discharged with plans for follow-up with neurosurgery as well as ENT to address the patient's vestibular therapy.  Meclizine and Zofran sent to the patient's pharmacy for as needed.  I discussed all of the findings and plan with the patient who endorses understanding and is in agreement with the plan.    ROS:  General: no fevers, chills  Respiratory: no cough, dyspnea  Cardiovascular: no chest pain, palpitations  Abdomen: No abdominal pain, nausea, vomiting, or diarrhea  Neurologic: No focal weakness    Objective   Physical Exam:  I have reviewed the vital signs.  Temp:  [97.7 °F (36.5 °C)-98.8 °F (37.1 °C)] 98 °F (36.7 °C)  Heart Rate:  [62-69] 64  Resp:  [16-18] 17  BP: (108-122)/(58-82) 113/58  General Appearance:  69-year-old female in no acute distress on room air  Head:    Normocephalic, atraumatic  Eyes:    Sclerae anicteric  Neck:   Supple, no mass  Lungs: Clear to auscultation bilaterally, respirations unlabored  Heart: Regular rate and rhythm, S1 and S2 normal, no murmur, rub or gallop  Abdomen:  Soft, nontender, bowel sounds active, nondistended  Extremities: No clubbing, cyanosis, or edema to lower extremities  Pulses:  2+ and symmetric in distal lower extremities  Skin: No rashes   Neurologic: Oriented x3, Normal strength to extremities    Results Review:    I have reviewed the labs, radiology results and diagnostic studies.    Results from last 7 days   Lab Units 10/22/24  0359   WBC 10*3/mm3 8.77   HEMOGLOBIN g/dL 12.3   HEMATOCRIT % 38.5   PLATELETS 10*3/mm3 267     Results from last 7 days   Lab Units 10/22/24  0359 10/21/24  1401   SODIUM mmol/L 140 139   POTASSIUM mmol/L 3.9 3.7    CHLORIDE mmol/L 105 103   CO2 mmol/L 24.2 25.3   BUN mg/dL 12 14   CREATININE mg/dL 0.81 0.91   CALCIUM mg/dL 9.8 9.7   BILIRUBIN mg/dL 0.4 0.4   ALK PHOS U/L 77 85   ALT (SGPT) U/L 17 18   AST (SGOT) U/L 24 25   GLUCOSE mg/dL 76 120*     Imaging Results (Last 24 Hours)       Procedure Component Value Units Date/Time    MRI Cervical Spine With & Without Contrast [[id number]] Collected: 10/22/24 2339     Updated: 10/22/24 2339    Narrative:        Patient: FRANCISCO ELMORE  Time Out: 23:37  Exam(s): MRI C SPINE W WO Contrast     EXAM:    MR Cervical Spine Without and With Intravenous Contrast    CLINICAL HISTORY:     Reason for exam: cervical stenosis.    TECHNIQUE:    Magnetic resonance images of the cervical spine without and with   intravenous contrast in multiple planes.  IV contrast is given.  Mild   motion artifact.    COMPARISON:     CTA neck 10 21 24.    FINDINGS:    Vertebrae:  No marrow edema, compression deformity, discitis or   osteomyelitis.    Spinal cord:    No definite abnormal signal or enhancement.    Soft tissues:  No epidural hematoma or abscess.  No prevertebral edema   or interspinous ligamentous edema.     DISCS SPINAL CANAL NEURAL FORAMINA:    C2-C3:  Unremarkable.    C3-C4: Moderate disc and osteophyte, mild central spinal stenosis and   severe bilateral foraminal stenosis.    C4-C5:  Moderate disc and osteophyte, mild central spinal stenosis and   severe bilateral foraminal stenosis.    C5-C6:  Moderate disc and osteophyte, mild central spinal stenosis and   severe bilateral foraminal stenosis.    C6-C7:  Moderate disc and osteophyte, and severe bilateral foraminal   stenosis.  No central spinal stenosis.    C7-T1:  Unremarkable.      OTHER: No isolated disc herniation.  No abnormal enhancement.    IMPRESSION:       1.  Mild central spinal stenosis C3-4, C4-5 and C5-6, degenerative.  2.  No abnormal enhancement, isolated disc herniation, epidural   hematoma abscess, abnormal cord signal,  compression deformity or   discitis osteomyelitis.    Impression:          Electronically signed by Liza Ely M.D. on 10-22-24 at 2337    MRI Outside Films [[id number]] Resulted: 10/22/24 1212     Updated: 10/22/24 1212    Narrative:      This procedure was auto-finalized with no dictation required.    MRI Outside Films [[id number]] Resulted: 10/22/24 1210     Updated: 10/22/24 1210    Narrative:      This procedure was auto-finalized with no dictation required.            I have reviewed the medications.  ---------------------------------------------------------------------------------------------  Assessment & Plan   Assessment/Problem List    Dizziness    Cervical stenosis of spine      Plan:  Dizziness  -CT head negative for acute findings  -Orthostatic vital signs normal  -CTA head and neck negative acute  -MRI brain with and without contrast and IAC protocol negative for intracranial findings, does note critical stenosis at C3-C4, C4-5  -Patient states she is aware of the previous finding of critical stenosis in the cervical spine, states she has had an outpatient MRI done that showed similar findings,  -Neurosurgery consulted  -Neurology consult, patient's symptoms consistent with BPPV, recommend outpatient ENT follow-up  -MRI cervical spine shows mild central spinal stenosis at C3-4, C4-5, C5-6 with no abnormal enhancement, disc herniation, abnormal cord signal or compression deformity.  -Meclizine and Zofran at discharge as needed  -Ambulatory referral to ENT placed  -Patient to follow-up with neurosurgery outpatient    Disposition: Home    Follow-up after Discharge: PCP, neurosurgery, ENT      55 minutes have been spent by [facility] providers in the care of this patient while under observation status.    Appropriate PPE worn during patient encounter.  Hand hygeine performed before and after seeing the patient.      Florinda Martines PA-C 10/23/24 05:37  EDT

## 2024-10-23 NOTE — PROGRESS NOTES
MD ATTESTATION NOTE    The DEONTE and I have discussed this patient's history, physical exam, and treatment plan.  I have reviewed the documentation and personally had a face to face interaction with the patient. I affirm the documentation and agree with the treatment and plan.  The attached note describes my personal findings.      I provided a substantive portion of the care of the patient.  I personally performed the physical exam in its entirety, and below are my findings.       Brief HPI: Patient mated with dizziness.  Symptoms have resolved feeling better    PHYSICAL EXAM  ED Triage Vitals   Temp Heart Rate Resp BP SpO2   10/21/24 1231 10/21/24 1218 10/21/24 1218 10/21/24 1218 10/21/24 1218   97.8 °F (36.6 °C) 58 20 136/77 99 %      Temp src Heart Rate Source Patient Position BP Location FiO2 (%)   10/21/24 1231 10/21/24 1837 10/21/24 1837 10/21/24 1837 --   Tympanic Monitor Lying Left arm          GENERAL: no acute distress  HENT: nares patent  EYES: no scleral icterus  CV: regular rhythm, normal rate  RESPIRATORY: normal effort  ABDOMEN: soft  MUSCULOSKELETAL: no deformity  NEURO: alert, moves all extremities, follows commands  PSYCH:  calm, cooperative  SKIN: warm, dry    Vital signs and nursing notes reviewed.        Plan: MRI showed mild C-spine stenosis.  CT and MRI brain within normal limits.  Patient cleared by neurology.  Anticipate discharge today.

## 2024-10-23 NOTE — OUTREACH NOTE
Prep Survey      Flowsheet Row Responses   [facility] patient discharged from? [place]   Is LACE score < 7 ? Yes   Eligibility [facility]   Date of Admission 10/21/24   Date of Discharge 10/23/24   Discharge Disposition Home or Self Care   Discharge diagnosis Dizziness   Does the patient have one of the following disease processes/diagnoses(primary or secondary)? Other   Does the patient have Home health ordered? No   Is there a DME ordered? No   Comments regarding appointments new PCP appt   Prep survey completed? Yes            Gracia FRANKLIN - Registered Nurse

## 2024-10-23 NOTE — DISCHARGE INSTRUCTIONS
You will have follow-up with Neurosurgery, the office should contact you regarding this appointment.  Call and follow up with ENT, Dr. Coy Dougherty, regarding your vertigo and thyroid nodule found on imaging.   Follow up with your PCP in 1 -2 weeks     Return to the emergency department with worsening symptoms, uncontrolled pain, inability to tolerate oral liquids, fever greater than 101°F not controlled by Tylenol or as needed with emergent concerns.

## 2024-10-23 NOTE — PLAN OF CARE
Goal Outcome Evaluation:      Pt is a 68 yo female continuing observation for dizziness. Pt denies any dizziness at this time. When pt got up to use restroom her gait was unsteady. She denies any pain or nausea. A&O x4. RA. Standby assist. Pt has a right arm restriction for history of breast cancer with lymph node removal. Pt has no further questions or complaints at this time.       Pt requested copy of c spine MRI results on disc. Per MRI they can print one on day shift. She is ready for discharge once her  arrives.

## 2024-10-23 NOTE — PLAN OF CARE
Goal Outcome Evaluation:              Outcome Evaluation: Pt 69 yr old AOX4 and able to verbalized needs. Two IV's removed. Discharged summary provided and education completed. Pt instructed to follow up with her PCP in 1-2 weeks. Pt instructed to  her medication at her pharmacy of choice. Pt aware to follow up with ENT and Neurosurgery. Pt gathered all his belongings and she did not have any other questions at this time. Pt left observation unit via private vehicle.

## 2024-10-23 NOTE — PLAN OF CARE
Goal Outcome Evaluation:    PT asked to see for mobility below baseline, pt is ambulating independently and is safe to go home, pt is appropriate to follow up with outpt vestibular rehab if cleared by [facility] per the neurologist's note dated yesterday.

## 2024-10-24 ENCOUNTER — TRANSITIONAL CARE MANAGEMENT TELEPHONE ENCOUNTER (OUTPATIENT)
Dept: CALL CENTER | Facility: HOSPITAL | Age: 69
End: 2024-10-24
Payer: COMMERCIAL

## 2024-10-24 NOTE — OUTREACH NOTE
Call Center TCM Note      Flowsheet Row Responses   [facility] patient discharged from? [place]   Does the patient have one of the following disease processes/diagnoses(primary or secondary)? Other   TCM attempt successful? Yes   Call start time 1135   Call end time 1137   Discharge diagnosis Dizziness   Meds reviewed with patient/caregiver? Yes   Is the patient having any side effects they believe may be caused by any medication additions or changes? No   Does the patient have all medications ordered at discharge? Yes   Is the patient taking all medications as directed (includes completed medication regime)? Yes   Does the patient have an appointment with their PCP within 7-14 days of discharge? Yes   Has home health visited the patient within 72 hours of discharge? N/A   Psychosocial issues? No   Did the patient receive a copy of their discharge instructions? Yes   Nursing interventions Reviewed instructions with patient   What is the patient's perception of their health status since discharge? Improving   Is the patient/caregiver able to teach back signs and symptoms related to disease process for when to call PCP? Yes   Is the patient/caregiver able to teach back signs and symptoms related to disease process for when to call 911? Yes   Is the patient/caregiver able to teach back the hierarchy of who to call/visit for symptoms/problems? PCP, Specialist, Home health nurse, Urgent Care, ED, 911 Yes   If the patient is a current smoker, are they able to teach back resources for cessation? Not a smoker   TCM call completed? Yes   Call end time 1137   Would this patient benefit from a Referral to Amb Social Work? No   Is the patient interested in additional calls from an ambulatory ? No            Akilah Polanco LPN    10/24/2024, 11:38 EDT

## 2024-10-24 NOTE — CASE MANAGEMENT/SOCIAL WORK
Case Management Discharge Note      Final Note: home         Selected Continued Care - Discharged on 10/23/2024 Admission date: 10/21/2024 - Discharge disposition: Home or Self Care      Destination    No services have been selected for the patient.                Durable Medical Equipment    No services have been selected for the patient.                Dialysis/Infusion    No services have been selected for the patient.                Home Medical Care    No services have been selected for the patient.                Therapy    No services have been selected for the patient.                Community Resources    No services have been selected for the patient.                Community & DME    No services have been selected for the patient.                         Final Discharge Disposition Code: 01 - home or self-care

## 2024-10-25 ENCOUNTER — TELEPHONE (OUTPATIENT)
Dept: NEUROSURGERY | Facility: CLINIC | Age: 69
End: 2024-10-25
Payer: COMMERCIAL

## 2024-10-25 NOTE — TELEPHONE ENCOUNTER
----- Message from Soniya Menchaca sent at 10/22/2024 11:45 AM EDT -----  Regarding: Hospital follow-up  Sending this 1 to you specifically because it is a bit confusing and I know you will be diligent in getting it correct.  She is in the hospital and hoping to have an MRI cervical spine today, but it is possible that they will not do it until late tonight and she wants to go home.  I did tell her that she can go home and we can arrange it on an outpatient basis if it is going to be late in the day.  She needs to be seen in follow-up after the MRI is complete.  I am out tomorrow so remind me on Thursday to see if she had it done and if not I will place the outpatient order to get it scheduled and then we will arrange for follow-up after.

## 2024-10-28 NOTE — PROGRESS NOTES
Subjective   Patient ID: June Baca is a 69 y.o. female is here today for follow-up on cervical stenosis after MRI cervical 10/22/24.  He has been patient in observation unit 10/22/2024 for evaluation of radiology reported critical cervical stenosis on MRI brain.  Patient was having acute vertigo issues and was also seen by neurology service.    History of Present Illness  She was cleared by our service to undergo Boston-Hallpike maneuver while inpatient. She states this was not done. She saw ENT provider 10/24 and thought dizziness related r/t vestibular neurititis. She was placed on steroid. She is feeling much better. She has not needed meclizine.  Denies any radicular arm symptoms.  She has chronic neck pain for which she sees a chiropractor.  She has been back under their care since being in the hospital and has delivered CD images of her MRI cervical    The following portions of the patient's history were reviewed and updated as appropriate: allergies, current medications, and problem list.    Review of Systems   Musculoskeletal:  Negative for neck pain and neck stiffness.   Neurological:  Negative for dizziness, weakness and headaches.   All other systems reviewed and are negative.      Objective     Vitals:    10/29/24 1137   BP: 112/62   Pulse: 81   SpO2: 97%   Weight: 53.7 kg (118 lb 6.4 oz)     Body mass index is 22.38 kg/m².      Physical Exam  Neurological Exam        Assessment & Plan   Independent Review of Radiographic Studies:      I personally reviewed the images from the following studies.    MRI cervical spine reveals multilevel degenerative disc disease resulting in mild canal stenosis at several levels and mild to moderate foraminal stenosis at nearly every level.  I do not think she has severe foraminal stenosis.  She has no cord signal change or cord compression at any level    Medical Decision Making:      Patient presents for hospital follow-up of cervical spine stenosis reported by  radiologist as severe from MRI brain review.  She presented with vertiginous symptoms.  MRI cervical spine reveals no evidence of severe canal stenosis.  She has at most moderate foraminal stenosis at several levels to my review.  Mild canal stenosis but no cord signal change.  Filomena-Hallpike maneuver was deferred by neurology service although I had a verbal conversation with Tomasa Cash PA-C that patient was cleared from our standpoint for treatment.  Patient states she is much improved after seeing ENT as an outpatient placed on steroids.  She has not required meclizine.  She is under the care of a chiropractor.  We discussed my concerns about this.  Massage and nonmanipulative treatments are okay but I would recommend against any neck manipulations.  She states she will take this under advisement and speak further with her chiropractor about other options.  She did deliver a disc of her cervical spine MRI to their office for review.  As the imaging findings were incidental and not severe, she needs no routine follow-up with our service, but I advised her to notify us or make follow-up if she starts to experience any radicular symptoms in her arms.    Diagnoses and all orders for this visit:    1. DDD (degenerative disc disease), cervical (Primary)    2. Cervical stenosis of spine      Return if symptoms worsen or fail to improve.

## 2024-10-29 ENCOUNTER — OFFICE VISIT (OUTPATIENT)
Dept: NEUROSURGERY | Facility: CLINIC | Age: 69
End: 2024-10-29
Payer: COMMERCIAL

## 2024-10-29 VITALS
SYSTOLIC BLOOD PRESSURE: 112 MMHG | OXYGEN SATURATION: 97 % | HEART RATE: 81 BPM | DIASTOLIC BLOOD PRESSURE: 62 MMHG | BODY MASS INDEX: 22.38 KG/M2 | WEIGHT: 118.4 LBS

## 2024-10-29 DIAGNOSIS — M50.30 DDD (DEGENERATIVE DISC DISEASE), CERVICAL: Primary | ICD-10-CM

## 2024-10-29 DIAGNOSIS — M48.02 CERVICAL STENOSIS OF SPINE: ICD-10-CM

## 2024-10-31 ENCOUNTER — OFFICE VISIT (OUTPATIENT)
Dept: INTERNAL MEDICINE | Facility: CLINIC | Age: 69
End: 2024-10-31
Payer: COMMERCIAL

## 2024-10-31 VITALS
DIASTOLIC BLOOD PRESSURE: 74 MMHG | TEMPERATURE: 98 F | HEART RATE: 72 BPM | HEIGHT: 62 IN | SYSTOLIC BLOOD PRESSURE: 102 MMHG | OXYGEN SATURATION: 99 % | WEIGHT: 118 LBS | BODY MASS INDEX: 21.71 KG/M2

## 2024-10-31 DIAGNOSIS — Z76.89 ENCOUNTER TO ESTABLISH CARE WITH NEW DOCTOR: Primary | ICD-10-CM

## 2024-10-31 DIAGNOSIS — R42 VERTIGO: ICD-10-CM

## 2024-10-31 DIAGNOSIS — E78.00 HYPERCHOLESTEROLEMIA: ICD-10-CM

## 2024-10-31 PROBLEM — Z12.11 ENCOUNTER FOR SCREENING FOR MALIGNANT NEOPLASM OF COLON: Status: RESOLVED | Noted: 2021-01-14 | Resolved: 2024-10-31

## 2024-10-31 PROCEDURE — 99214 OFFICE O/P EST MOD 30 MIN: CPT | Performed by: STUDENT IN AN ORGANIZED HEALTH CARE EDUCATION/TRAINING PROGRAM

## 2025-05-06 ENCOUNTER — OFFICE VISIT (OUTPATIENT)
Dept: INTERNAL MEDICINE | Facility: CLINIC | Age: 70
End: 2025-05-06
Payer: COMMERCIAL

## 2025-05-06 VITALS
WEIGHT: 122 LBS | DIASTOLIC BLOOD PRESSURE: 74 MMHG | OXYGEN SATURATION: 100 % | SYSTOLIC BLOOD PRESSURE: 118 MMHG | BODY MASS INDEX: 22.45 KG/M2 | TEMPERATURE: 98 F | RESPIRATION RATE: 18 BRPM | HEIGHT: 62 IN | HEART RATE: 64 BPM

## 2025-05-06 DIAGNOSIS — N28.1 ACQUIRED RENAL CYST OF LEFT KIDNEY: ICD-10-CM

## 2025-05-06 DIAGNOSIS — Z00.00 ANNUAL PHYSICAL EXAM: Primary | ICD-10-CM

## 2025-05-06 DIAGNOSIS — E04.1 NODULE OF RIGHT LOBE OF THYROID GLAND: ICD-10-CM

## 2025-05-06 DIAGNOSIS — Z23 NEED FOR COVID-19 VACCINE: ICD-10-CM

## 2025-05-06 DIAGNOSIS — R91.8 MULTIPLE PULMONARY NODULES: ICD-10-CM

## 2025-05-06 DIAGNOSIS — E78.00 HYPERCHOLESTEROLEMIA: Chronic | ICD-10-CM

## 2025-05-06 DIAGNOSIS — L98.9 SKIN LESION OF HAND: ICD-10-CM

## 2025-05-06 PROBLEM — R42 VERTIGO: Status: RESOLVED | Noted: 2024-10-21 | Resolved: 2025-05-06

## 2025-05-06 PROBLEM — M79.89 FAT NECROSIS: Status: RESOLVED | Noted: 2019-09-24 | Resolved: 2025-05-06

## 2025-05-06 LAB
CHOLEST SERPL-MCNC: 210 MG/DL (ref 0–200)
HDLC SERPL-MCNC: 81 MG/DL (ref 40–60)
LDLC SERPL CALC-MCNC: 114 MG/DL (ref 0–100)
TRIGL SERPL-MCNC: 85 MG/DL (ref 0–150)
VLDLC SERPL CALC-MCNC: 15 MG/DL (ref 5–40)

## 2025-05-06 RX ORDER — BETAMETHASONE DIPROPIONATE 0.5 MG/G
1 CREAM TOPICAL 2 TIMES DAILY
Qty: 15 G | Refills: 5 | Status: SHIPPED | OUTPATIENT
Start: 2025-05-06

## 2025-05-06 NOTE — PROGRESS NOTES
"Chief Complaint  Annual Exam, itchy rash on hands    Subjective        June Baca presents to clinic today for her annual physical exam. Overall patient states that she feels well and has no acute complaints. Taking all medications as directed without any noticeable side effects.     One complaint patient does have today is an itchy rash over the bilateral hands. States that this symptom has been present for a number of years. Prior PCP prescribed betamethasone cream, which was helpful for this symptom. She requests a refill today. I recommended a dermatology referral for further evaluation of this chronic problem, and patient was open to this.     Patient following with urology for monitoring of left renal cyst. Imaging also notable for right lobe thyroid nodule and multiple pulmonary nodules. She follows with ENT for thyroid nodule surveillance.   History of Present Illness      Objective   Vital Signs:  /74 (BP Location: Left arm, Patient Position: Sitting, Cuff Size: Adult)   Pulse 64   Temp 98 °F (36.7 °C)   Resp 18   Ht 156.2 cm (61.5\")   Wt 55.3 kg (122 lb)   SpO2 100%   BMI 22.68 kg/m²   Estimated body mass index is 22.68 kg/m² as calculated from the following:    Height as of this encounter: 156.2 cm (61.5\").    Weight as of this encounter: 55.3 kg (122 lb).    BMI is within normal parameters. No other follow-up for BMI required.    Physical Exam  Constitutional:       General: She is not in acute distress.     Appearance: Normal appearance.   HENT:      Right Ear: Tympanic membrane and ear canal normal.      Left Ear: Tympanic membrane and ear canal normal.      Mouth/Throat:      Mouth: Mucous membranes are moist.      Pharynx: Oropharynx is clear. No posterior oropharyngeal erythema.   Eyes:      Extraocular Movements: Extraocular movements intact.      Conjunctiva/sclera: Conjunctivae normal.      Pupils: Pupils are equal, round, and reactive to light.   Cardiovascular:      Rate " and Rhythm: Normal rate and regular rhythm.      Heart sounds: No murmur heard.  Pulmonary:      Effort: Pulmonary effort is normal.      Breath sounds: Normal breath sounds. No wheezing.   Musculoskeletal:         General: No swelling or deformity. Normal range of motion.   Skin:     General: Skin is warm and dry.      Findings: Rash present.      Comments: There are a few, small, scab-like lesions over bilateral hands typically located between the fingers.   Neurological:      General: No focal deficit present.      Mental Status: She is oriented to person, place, and time. Mental status is at baseline.   Psychiatric:         Mood and Affect: Mood normal.         Behavior: Behavior normal.     Result Review :  The following data was reviewed by: Antonina Contreras MD on 05/06/2025:                 Current Outpatient Medications:     ALLERGY SERUM INJECTION, Inject  under the skin into the appropriate area as directed 1 (One) Time., Disp: , Rfl:     B Complex Vitamins (vitamin b complex) capsule capsule, Take 1 capsule by mouth Daily., Disp: , Rfl:     CALCIUM PO, Take 1 tablet by mouth Daily., Disp: , Rfl:     CBD (cannabidiol) oral oil, Daily., Disp: , Rfl:     Cholecalciferol (VITAMIN D) 1000 UNITS tablet, Take 1 tablet by mouth., Disp: , Rfl:     Fluticasone Propionate (FLONASE ALLERGY RELIEF NA), 1 spray into the nostril(s) as directed by provider Daily., Disp: , Rfl:     levocetirizine (XYZAL) 5 MG tablet, Take 1 tablet by mouth Daily., Disp: , Rfl:     MAGNESIUM PO, Take 1 tablet by mouth Daily., Disp: , Rfl:     meclizine (ANTIVERT) 25 MG tablet, Take 1 tablet by mouth 3 (Three) Times a Day As Needed for Dizziness., Disp: 30 tablet, Rfl: 0    Multiple Vitamins-Minerals (MULTIVITAMIN ADULT PO), Take 1 tablet by mouth Daily., Disp: , Rfl:     ondansetron ODT (ZOFRAN-ODT) 4 MG disintegrating tablet, Place 1 tablet on the tongue Every 8 (Eight) Hours As Needed for Nausea or Vomiting., Disp: 20 tablet, Rfl: 0     rosuvastatin (CRESTOR) 5 MG tablet, TAKE 1 TABLET DAILY, Disp: 90 tablet, Rfl: 3    Vitamin E 400 units tablet, Take 1 tablet by mouth Daily., Disp: , Rfl:     betamethasone dipropionate (DIPROSONE) 0.05 % cream, Apply 1 Application topically to the appropriate area as directed 2 (Two) Times a Day., Disp: 15 g, Rfl: 5      Assessment and Plan   Diagnoses and all orders for this visit:    1. Annual physical exam (Primary)    2. Hypercholesterolemia  Assessment & Plan:  Continue Crestor daily  Lipid panel today    Orders:  -     Lipid Panel    3. Skin lesion of hand  -     betamethasone dipropionate (DIPROSONE) 0.05 % cream; Apply 1 Application topically to the appropriate area as directed 2 (Two) Times a Day.  Dispense: 15 g; Refill: 5  -     Ambulatory Referral to Dermatology    4. Need for COVID-19 vaccine  -     Cancel: COVID-19 (Pfizer) 12yrs+ (COMIRNATY)    5. Acquired renal cyst of left kidney  Assessment & Plan:  Following with urology for routine imaging and urine studies.   Urology note from 12/23/2024 reviewed.      6. Nodule of right lobe of thyroid gland  Assessment & Plan:  Patient following with ENT.      7. Multiple pulmonary nodules  Assessment & Plan:  Repeat CT Chest WO due in December 2025 for routine monitoring.     Orders:  -     CT Chest Without Contrast; Future       I personally reviewed PMH, PSxH, family history, allergies, social history, and medication list. I recommend that patient stay UTD on all age- and population-recommended vaccinations, avoid tobacco use, and use moderation if drinking alcohol (no more than 7 drinks per week). I provided information on healthy eating habits and exercise in Wrap Up tab.        Follow Up   Return in about 6 months (around 11/6/2025) for office visit.    Patient was given instructions and counseling regarding her condition or for health maintenance advice. Please see specific information pulled into the AVS if appropriate.     Antonina Contreras MD

## 2025-05-06 NOTE — ASSESSMENT & PLAN NOTE
Following with urology for routine imaging and urine studies.   Urology note from 12/23/2024 reviewed.

## (undated) DEVICE — SINGLE-USE BIOPSY FORCEPS: Brand: RADIAL JAW 4

## (undated) DEVICE — CANN O2 ETCO2 FITS ALL CONN CO2 SMPL A/ 7IN DISP LF

## (undated) DEVICE — ADAPT CLN BIOGUARD AIR/H2O DISP

## (undated) DEVICE — KT ORCA ORCAPOD DISP STRL

## (undated) DEVICE — THE SINGLE USE ETRAP – POLYP TRAP IS USED FOR SUCTION RETRIEVAL OF ENDOSCOPICALLY REMOVED POLYPS.: Brand: ETRAP

## (undated) DEVICE — SNAR POLYP SENSATION STDOVL 27 240 BX40

## (undated) DEVICE — LN SMPL CO2 SHTRM SD STREAM W/M LUER

## (undated) DEVICE — TUBING, SUCTION, 1/4" X 10', STRAIGHT: Brand: MEDLINE

## (undated) DEVICE — THE TORRENT IRRIGATION SCOPE CONNECTOR IS USED WITH THE TORRENT IRRIGATION TUBING TO PROVIDE IRRIGATION FLUIDS SUCH AS STERILE WATER DURING GASTROINTESTINAL ENDOSCOPIC PROCEDURES WHEN USED IN CONJUNCTION WITH AN IRRIGATION PUMP (OR ELECTROSURGICAL UNIT).: Brand: TORRENT

## (undated) DEVICE — SENSR O2 OXIMAX FNGR A/ 18IN NONSTR